# Patient Record
Sex: FEMALE | Race: BLACK OR AFRICAN AMERICAN | NOT HISPANIC OR LATINO | Employment: OTHER | URBAN - METROPOLITAN AREA
[De-identification: names, ages, dates, MRNs, and addresses within clinical notes are randomized per-mention and may not be internally consistent; named-entity substitution may affect disease eponyms.]

---

## 2017-01-24 ENCOUNTER — TELEPHONE (OUTPATIENT)
Dept: PHARMACY | Facility: CLINIC | Age: 77
End: 2017-01-24

## 2017-01-24 DIAGNOSIS — D47.1 MYELOPROLIFERATIVE DISORDER: ICD-10-CM

## 2017-01-24 RX ORDER — RUXOLITINIB 20 MG/1
TABLET ORAL
Qty: 60 TABLET | Refills: 0 | Status: SHIPPED | OUTPATIENT
Start: 2017-01-24 | End: 2017-04-21

## 2017-01-25 DIAGNOSIS — D47.1 MYELOPROLIFERATIVE DISORDER: ICD-10-CM

## 2017-01-25 RX ORDER — RUXOLITINIB 20 MG/1
TABLET ORAL
Qty: 60 TABLET | Refills: 0 | Status: SHIPPED | OUTPATIENT
Start: 2017-01-25 | End: 2017-04-19 | Stop reason: SDUPTHER

## 2017-01-27 ENCOUNTER — TELEPHONE (OUTPATIENT)
Dept: HEMATOLOGY/ONCOLOGY | Facility: CLINIC | Age: 77
End: 2017-01-27

## 2017-01-27 NOTE — TELEPHONE ENCOUNTER
----- Message from Zamzam Larios sent at 1/27/2017 11:12 AM CST -----  Patient's  would like the nurse to give him a call back about setting up a follow up visit. He can be reached at [phone].

## 2017-01-27 NOTE — TELEPHONE ENCOUNTER
Returned call and spoke with Mr. Hammer. Patient  calling to schedule Mrs Hammer f/u appointment with Dr Daniels and lab work. Appointment scheduled and appointment information given to Mr Hammer and mailed to their home.

## 2017-02-03 ENCOUNTER — LAB VISIT (OUTPATIENT)
Dept: LAB | Facility: HOSPITAL | Age: 77
End: 2017-02-03
Attending: INTERNAL MEDICINE
Payer: MEDICARE

## 2017-02-03 ENCOUNTER — OFFICE VISIT (OUTPATIENT)
Dept: HEMATOLOGY/ONCOLOGY | Facility: CLINIC | Age: 77
End: 2017-02-03
Payer: MEDICARE

## 2017-02-03 VITALS
HEART RATE: 95 BPM | SYSTOLIC BLOOD PRESSURE: 126 MMHG | WEIGHT: 108.44 LBS | DIASTOLIC BLOOD PRESSURE: 64 MMHG | TEMPERATURE: 99 F | HEIGHT: 60 IN | BODY MASS INDEX: 21.29 KG/M2

## 2017-02-03 DIAGNOSIS — D47.3 ESSENTIAL THROMBOCYTHEMIA: Primary | ICD-10-CM

## 2017-02-03 DIAGNOSIS — D47.3 ESSENTIAL THROMBOCYTHEMIA: ICD-10-CM

## 2017-02-03 LAB
ALBUMIN SERPL BCP-MCNC: 4 G/DL
ALP SERPL-CCNC: 59 U/L
ALT SERPL W/O P-5'-P-CCNC: 31 U/L
ANION GAP SERPL CALC-SCNC: 8 MMOL/L
ANISOCYTOSIS BLD QL SMEAR: SLIGHT
AST SERPL-CCNC: 33 U/L
BASOPHILS # BLD AUTO: ABNORMAL K/UL
BASOPHILS NFR BLD: 0 %
BILIRUB SERPL-MCNC: 0.6 MG/DL
BUN SERPL-MCNC: 23 MG/DL
CALCIUM SERPL-MCNC: 9.6 MG/DL
CHLORIDE SERPL-SCNC: 105 MMOL/L
CO2 SERPL-SCNC: 28 MMOL/L
CREAT SERPL-MCNC: 1.4 MG/DL
DIFFERENTIAL METHOD: ABNORMAL
EOSINOPHIL # BLD AUTO: ABNORMAL K/UL
EOSINOPHIL NFR BLD: 0 %
ERYTHROCYTE [DISTWIDTH] IN BLOOD BY AUTOMATED COUNT: 16 %
EST. GFR  (AFRICAN AMERICAN): 42.1 ML/MIN/1.73 M^2
EST. GFR  (NON AFRICAN AMERICAN): 36.5 ML/MIN/1.73 M^2
GLUCOSE SERPL-MCNC: 82 MG/DL
HCT VFR BLD AUTO: 27.2 %
HGB BLD-MCNC: 9.2 G/DL
HYPOCHROMIA BLD QL SMEAR: ABNORMAL
LDH SERPL L TO P-CCNC: 454 U/L
LYMPHOCYTES # BLD AUTO: ABNORMAL K/UL
LYMPHOCYTES NFR BLD: 14 %
MCH RBC QN AUTO: 31.1 PG
MCHC RBC AUTO-ENTMCNC: 33.8 %
MCV RBC AUTO: 92 FL
MONOCYTES # BLD AUTO: ABNORMAL K/UL
MONOCYTES NFR BLD: 8 %
MYELOCYTES NFR BLD MANUAL: 1 %
NEUTROPHILS NFR BLD: 76 %
NEUTS BAND NFR BLD MANUAL: 1 %
OVALOCYTES BLD QL SMEAR: ABNORMAL
PLATELET # BLD AUTO: 541 K/UL
PMV BLD AUTO: 10.2 FL
POIKILOCYTOSIS BLD QL SMEAR: SLIGHT
POLYCHROMASIA BLD QL SMEAR: ABNORMAL
POTASSIUM SERPL-SCNC: 4 MMOL/L
PROT SERPL-MCNC: 8.7 G/DL
RBC # BLD AUTO: 2.96 M/UL
SODIUM SERPL-SCNC: 141 MMOL/L
URATE SERPL-MCNC: 6.9 MG/DL
WBC # BLD AUTO: 31.15 K/UL

## 2017-02-03 PROCEDURE — 99999 PR PBB SHADOW E&M-EST. PATIENT-LVL III: CPT | Mod: PBBFAC,,, | Performed by: INTERNAL MEDICINE

## 2017-02-03 PROCEDURE — 99213 OFFICE O/P EST LOW 20 MIN: CPT | Mod: S$PBB,,, | Performed by: INTERNAL MEDICINE

## 2017-02-03 PROCEDURE — 84550 ASSAY OF BLOOD/URIC ACID: CPT

## 2017-02-03 PROCEDURE — 80053 COMPREHEN METABOLIC PANEL: CPT

## 2017-02-03 PROCEDURE — 85027 COMPLETE CBC AUTOMATED: CPT

## 2017-02-03 PROCEDURE — 83615 LACTATE (LD) (LDH) ENZYME: CPT

## 2017-02-03 PROCEDURE — 85007 BL SMEAR W/DIFF WBC COUNT: CPT

## 2017-02-03 PROCEDURE — 36415 COLL VENOUS BLD VENIPUNCTURE: CPT

## 2017-02-03 NOTE — LETTER
February 3, 2017        Jd Wooten Jr., MD  [address]  [address]  [facility]  [place] [address]             [facility]  [address]  [place] [address]  Phone: [phone]   Patient: Courtney Hammer   MR Number: [id number]   YOB: 1940   Date of Visit: 2/3/2017       Dear Dr. Wooten:    Thank you for referring Courtney Hammer to me for evaluation. Below are the relevant portions of my assessment and plan of care.       1. Essential thrombocythemia         Ms. 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 However, her syndrome accelerated with a rapidly rising WBC in late 2015 with falling hemoglobin. A repeat marrow on 1/26/16 showed a 100% cellular marrow with increased megakaryocytes and storage iron and only occasional blasts on CD34 staining (morphologic blast count 1%).  Jak2 V617F mutation was again detected without CalR or Mpl exon 10 mutations.    Hence, she was started on ruxolitinib 20 mg bid 2/2016.  Her WBC has been controlled since that time and is 31K today with stable hemoglobin at 9.2 and platelets 541K.  She has not needed any blood transfusions since her visit in early 3/2016.  Depending on her platelet rise in the future we may need to alter ruxolitinib dosing.    No other new medical issues and she is tolerating ruxolitinib well.    All of her questions were answered in the clinic today and she will follow up with Dr. Wooten in 2 months with RTC in four months.     If you have questions, please do not hesitate to call me. I look forward to following Courtney along with you.    Sincerely,      Rico Daniels MD           CC  Florencio Pacheco MD

## 2017-02-03 NOTE — PROGRESS NOTES
Subjective:       Patient ID: Courtney Hammer is a 76 y.o. female.    Chief Complaint: No chief complaint on file.    HPI  Ms. Hammer is here for follow up of a Jak2 V617F positive myeloproliferative disorder now on ruxolitinib. She has also been followed by Dr. Wooten. She is feeling quite well today and has not required any blood transfusion since 3/2016 with much improved WBC and stable platelet count.  No new issues and no other side effects on ruxolitinib.  She is quite functional.    History:  She was first noted to have an elevated platelet count in early 2011 with CBC 6/7/11 showing WBC 56.9K, Hb 12.0, Plt 1090K (74P, 21L).  On 7/8/11 she had a marrow biopsy showing 90% cellularity with megakaryocyte hyperplasia with dysmorphic features. Myeloid hyperplasia with left shift. No lymphoid infiltrate or increased blasts. Flow cytometry showed virtually no B cells but no other concerning features. [employer] 46,XY[20]. Bcr-abl negative. Testing for Jak2 V617F on 7/7/15 was positive in 5% of total Jak2 DNA. Additional testing that same day for CalR and Mpl exon 10 mutation were both negative. Hence, she has Jak2+ ET.    She was started on hydroxyurea and ASA for essential thrombocythemia and had been stable on this until she began to require blood transfusions early in 2015. Her RBC transfusion needs have slowly increased and she has been continued on 1500 mg hydroxyurea, her same dose for many years. This has been decreased but her transfusion needs continue along with an alarming increase in her WBC.    CBC 6/18/15 WBC 31.8K, hemoglobin 4.4, platelets 212K. She received 4U PRBC and CBC on 6/30 WBC 23K, hemoglobin 10.3, platelets 97K.  A repeat marrow on 1/26/16 showed a 100% cellular marrow with increased megakaryocytes and storage iron and only occasional blasts on CD34 staining (morphologic blast count 1%).  Jak2 V617F mutation was again detected without CalR or Mpl exon 10 mutations.    Hence, she was started on  ruxolitinib 20 mg bid 2/2015.     Review of Systems   Constitutional: Negative for appetite change (better on megace), diaphoresis, fatigue and fever.   HENT: Positive for postnasal drip. Negative for mouth sores, nosebleeds and sore throat.    Respiratory: Negative for cough and shortness of breath.    Cardiovascular: Negative for chest pain and leg swelling.   Gastrointestinal: Negative for abdominal pain, blood in stool, constipation, diarrhea and nausea.   Genitourinary: Positive for frequency. Negative for dysuria, hematuria and vaginal bleeding (menopause at age 65).   Musculoskeletal: Negative for arthralgias, back pain and joint swelling.   Skin: Negative for rash.   Neurological: Negative for tremors, weakness, light-headedness and headaches.   Psychiatric/Behavioral: Negative for confusion. The patient is not nervous/anxious.        Objective:      Physical Exam   Constitutional: She is oriented to person, place, and time. She appears well-developed and well-nourished. No distress.   Elderly female in no distress   HENT:   Head: Normocephalic and atraumatic.   Mouth/Throat: No oropharyngeal exudate.   Eyes: Conjunctivae are normal. Pupils are equal, round, and reactive to light.   Neck: Neck supple.   Cardiovascular: Normal rate and regular rhythm.    Pulmonary/Chest: Effort normal and breath sounds normal. She has no rales.   Abdominal: Soft. Bowel sounds are normal. She exhibits no mass. There is no hepatosplenomegaly. There is no tenderness.   Musculoskeletal: Normal range of motion. She exhibits no edema.   Lymphadenopathy:     She has no cervical adenopathy.     She has no axillary adenopathy.        Right: No inguinal adenopathy present.        Left: No inguinal adenopathy present.   Neurological: She is alert and oriented to person, place, and time.   Skin: Skin is warm and dry. No rash noted.   Psychiatric: She has a normal mood and affect. Thought content normal.       Assessment:       1.  Essential thrombocythemia        Plan:       Ms. 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 However, her syndrome accelerated with a rapidly rising WBC in late 2015 with falling hemoglobin. A repeat marrow on 1/26/16 showed a 100% cellular marrow with increased megakaryocytes and storage iron and only occasional blasts on CD34 staining (morphologic blast count 1%).  Jak2 V617F mutation was again detected without CalR or Mpl exon 10 mutations.    Hence, she was started on ruxolitinib 20 mg bid 2/2016.  Her WBC has been controlled since that time and is 31K today with stable hemoglobin at 9.2 and platelets 541K.  She has not needed any blood transfusions since her visit in early 3/2016.  Depending on her platelet rise in the future we may need to alter ruxolitinib dosing.    No other new medical issues and she is tolerating ruxolitinib well.    All of her questions were answered in the clinic today and she will follow up with Dr. Wooten in 2 months with RTC in four months.

## 2017-02-27 ENCOUNTER — TELEPHONE (OUTPATIENT)
Dept: PHARMACY | Facility: CLINIC | Age: 77
End: 2017-02-27

## 2017-03-01 ENCOUNTER — TELEPHONE (OUTPATIENT)
Dept: HEMATOLOGY/ONCOLOGY | Facility: CLINIC | Age: 77
End: 2017-03-01

## 2017-03-01 NOTE — TELEPHONE ENCOUNTER
----- Message from Jared Ledesma sent at 3/1/2017 12:08 PM CST -----  Contact: self   Pt  is calling in to get in touch with the nurse or doctor regarding getting in touch with the doctor in [place].     Please contact pt for more information needed at [phone]    Thanks

## 2017-03-02 ENCOUNTER — TELEPHONE (OUTPATIENT)
Dept: PHARMACY | Facility: CLINIC | Age: 77
End: 2017-03-02

## 2017-03-02 NOTE — TELEPHONE ENCOUNTER
DOCUMENTATION ONLY:  TATIANA Override Approved 3/2/17  Case#: [id number]  Approval Dates: 3/2/17-3/2/18

## 2017-03-27 ENCOUNTER — TELEPHONE (OUTPATIENT)
Dept: PHARMACY | Facility: CLINIC | Age: 77
End: 2017-03-27

## 2017-04-19 ENCOUNTER — TELEPHONE (OUTPATIENT)
Dept: PHARMACY | Facility: CLINIC | Age: 77
End: 2017-04-19

## 2017-04-19 DIAGNOSIS — D47.1 MYELOPROLIFERATIVE DISORDER: ICD-10-CM

## 2017-04-21 DIAGNOSIS — D47.1 MYELOPROLIFERATIVE DISORDER: ICD-10-CM

## 2017-04-21 RX ORDER — RUXOLITINIB 20 MG/1
TABLET ORAL
Qty: 60 TABLET | Refills: 0 | Status: SHIPPED | OUTPATIENT
Start: 2017-04-21 | End: 2017-04-21 | Stop reason: SDUPTHER

## 2017-04-25 ENCOUNTER — TELEPHONE (OUTPATIENT)
Dept: PHARMACY | Facility: CLINIC | Age: 77
End: 2017-04-25

## 2017-05-18 ENCOUNTER — TELEPHONE (OUTPATIENT)
Dept: PHARMACY | Facility: CLINIC | Age: 77
End: 2017-05-18

## 2017-06-12 ENCOUNTER — TELEPHONE (OUTPATIENT)
Dept: PHARMACY | Facility: CLINIC | Age: 77
End: 2017-06-12

## 2017-06-21 ENCOUNTER — OFFICE VISIT (OUTPATIENT)
Dept: HEMATOLOGY/ONCOLOGY | Facility: CLINIC | Age: 77
End: 2017-06-21
Payer: MEDICARE

## 2017-06-21 ENCOUNTER — LAB VISIT (OUTPATIENT)
Dept: LAB | Facility: HOSPITAL | Age: 77
End: 2017-06-21
Attending: INTERNAL MEDICINE
Payer: MEDICARE

## 2017-06-21 VITALS
SYSTOLIC BLOOD PRESSURE: 129 MMHG | DIASTOLIC BLOOD PRESSURE: 67 MMHG | TEMPERATURE: 99 F | HEART RATE: 115 BPM | WEIGHT: 103.38 LBS | HEIGHT: 61 IN | BODY MASS INDEX: 19.52 KG/M2

## 2017-06-21 DIAGNOSIS — D64.81 ANEMIA DUE TO ANTINEOPLASTIC CHEMOTHERAPY: ICD-10-CM

## 2017-06-21 DIAGNOSIS — D47.3 ESSENTIAL THROMBOCYTHEMIA: Primary | ICD-10-CM

## 2017-06-21 DIAGNOSIS — D47.3 ESSENTIAL THROMBOCYTHEMIA: ICD-10-CM

## 2017-06-21 DIAGNOSIS — T45.1X5A ANEMIA DUE TO ANTINEOPLASTIC CHEMOTHERAPY: ICD-10-CM

## 2017-06-21 LAB
ALBUMIN SERPL BCP-MCNC: 4.1 G/DL
ALP SERPL-CCNC: 72 U/L
ALT SERPL W/O P-5'-P-CCNC: 45 U/L
ANION GAP SERPL CALC-SCNC: 9 MMOL/L
ANISOCYTOSIS BLD QL SMEAR: SLIGHT
AST SERPL-CCNC: 49 U/L
BASOPHILS NFR BLD: 1 %
BILIRUB SERPL-MCNC: 0.8 MG/DL
BUN SERPL-MCNC: 18 MG/DL
CALCIUM SERPL-MCNC: 9.6 MG/DL
CHLORIDE SERPL-SCNC: 104 MMOL/L
CO2 SERPL-SCNC: 27 MMOL/L
CREAT SERPL-MCNC: 1.6 MG/DL
DACRYOCYTES BLD QL SMEAR: ABNORMAL
DIFFERENTIAL METHOD: ABNORMAL
EOSINOPHIL NFR BLD: 0 %
ERYTHROCYTE [DISTWIDTH] IN BLOOD BY AUTOMATED COUNT: 14.7 %
EST. GFR  (AFRICAN AMERICAN): 35.8 ML/MIN/1.73 M^2
EST. GFR  (NON AFRICAN AMERICAN): 31.1 ML/MIN/1.73 M^2
GIANT PLATELETS BLD QL SMEAR: PRESENT
GLUCOSE SERPL-MCNC: 223 MG/DL
HCT VFR BLD AUTO: 24.2 %
HGB BLD-MCNC: 8 G/DL
HYPOCHROMIA BLD QL SMEAR: ABNORMAL
LYMPHOCYTES NFR BLD: 11 %
MCH RBC QN AUTO: 31.3 PG
MCHC RBC AUTO-ENTMCNC: 33.1 %
MCV RBC AUTO: 95 FL
METAMYELOCYTES NFR BLD MANUAL: 3 %
MONOCYTES NFR BLD: 6 %
MYELOCYTES NFR BLD MANUAL: 7 %
NEUTROPHILS NFR BLD: 69 %
NEUTS BAND NFR BLD MANUAL: 3 %
PLATELET # BLD AUTO: 177 K/UL
PLATELET BLD QL SMEAR: ABNORMAL
PMV BLD AUTO: 10.7 FL
POIKILOCYTOSIS BLD QL SMEAR: SLIGHT
POLYCHROMASIA BLD QL SMEAR: ABNORMAL
POTASSIUM SERPL-SCNC: 3.6 MMOL/L
PROT SERPL-MCNC: 8.5 G/DL
RBC # BLD AUTO: 2.56 M/UL
SODIUM SERPL-SCNC: 140 MMOL/L
WBC # BLD AUTO: 25.92 K/UL

## 2017-06-21 PROCEDURE — 85007 BL SMEAR W/DIFF WBC COUNT: CPT

## 2017-06-21 PROCEDURE — 99999 PR PBB SHADOW E&M-EST. PATIENT-LVL II: CPT | Mod: PBBFAC,,, | Performed by: INTERNAL MEDICINE

## 2017-06-21 PROCEDURE — 80053 COMPREHEN METABOLIC PANEL: CPT

## 2017-06-21 PROCEDURE — 36415 COLL VENOUS BLD VENIPUNCTURE: CPT

## 2017-06-21 PROCEDURE — 1159F MED LIST DOCD IN RCRD: CPT | Mod: ,,, | Performed by: INTERNAL MEDICINE

## 2017-06-21 PROCEDURE — 99213 OFFICE O/P EST LOW 20 MIN: CPT | Mod: S$PBB,,, | Performed by: INTERNAL MEDICINE

## 2017-06-21 PROCEDURE — 85027 COMPLETE CBC AUTOMATED: CPT

## 2017-06-21 PROCEDURE — 1126F AMNT PAIN NOTED NONE PRSNT: CPT | Mod: ,,, | Performed by: INTERNAL MEDICINE

## 2017-06-21 NOTE — PROGRESS NOTES
Subjective:       Patient ID: Courtney Hammer is a 76 y.o. female.    Chief Complaint: No chief complaint on file.    HPI  Ms. Hammer is here for follow up of a Jak2 V617F positive myeloproliferative disorder now on ruxolitinib. She has also been followed by Dr. Wooten in the past.  She notes recent concerns with renal dysfunction and has noted polydipsia and polyuria.   She has also been somewhat weaker overall.  No blood transfusion since 3/2016 with much improved WBC and normal platelet count.      History:  She was first noted to have an elevated platelet count in early 2011 with CBC 6/7/11 showing WBC 56.9K, Hb 12.0, Plt 1090K (74P, 21L).  On 7/8/11 she had a marrow biopsy showing 90% cellularity with megakaryocyte hyperplasia with dysmorphic features. Myeloid hyperplasia with left shift. No lymphoid infiltrate or increased blasts. Flow cytometry showed virtually no B cells but no other concerning features. [employer] 46,XY[20]. Bcr-abl negative. Testing for Jak2 V617F on 7/7/15 was positive in 5% of total Jak2 DNA. Additional testing that same day for CalR and Mpl exon 10 mutation were both negative. Hence, she has Jak2+ ET.    She was started on hydroxyurea and ASA for essential thrombocythemia and had been stable on this until she began to require blood transfusions early in 2015. Her RBC transfusion needs have slowly increased and she has been continued on 1500 mg hydroxyurea, her same dose for many years. This has been decreased but her transfusion needs continue along with an alarming increase in her WBC.    CBC 6/18/15 WBC 31.8K, hemoglobin 4.4, platelets 212K. She received 4U PRBC and CBC on 6/30 WBC 23K, hemoglobin 10.3, platelets 97K.  A repeat marrow on 1/26/16 showed a 100% cellular marrow with increased megakaryocytes and storage iron and only occasional blasts on CD34 staining (morphologic blast count 1%).  Jak2 V617F mutation was again detected without CalR or Mpl exon 10 mutations.    Hence, she  was started on ruxolitinib 20 mg bid 2/2015.     Review of Systems   Constitutional: Negative for appetite change (better on megace), diaphoresis, fatigue and fever.   HENT: Positive for postnasal drip. Negative for mouth sores, nosebleeds and sore throat.    Respiratory: Negative for cough and shortness of breath.    Cardiovascular: Negative for chest pain and leg swelling.   Gastrointestinal: Negative for abdominal pain, blood in stool, constipation, diarrhea and nausea.   Endocrine: Positive for polydipsia and polyuria.   Genitourinary: Positive for frequency. Negative for dysuria, hematuria and vaginal bleeding (menopause at age 65).   Musculoskeletal: Negative for arthralgias, back pain and joint swelling.   Skin: Negative for rash.   Neurological: Negative for tremors, weakness, light-headedness and headaches.   Psychiatric/Behavioral: Negative for confusion. The patient is not nervous/anxious.        Objective:      Physical Exam   Constitutional: She is oriented to person, place, and time. She appears well-developed and well-nourished. No distress.   Elderly female in no distress   HENT:   Head: Normocephalic and atraumatic.   Mouth/Throat: No oropharyngeal exudate.   Eyes: Conjunctivae are normal. Pupils are equal, round, and reactive to light.   Neck: Neck supple.   Cardiovascular: Normal rate and regular rhythm.    Pulmonary/Chest: Effort normal and breath sounds normal. She has no rales.   Abdominal: Soft. Bowel sounds are normal. She exhibits no mass. There is no hepatosplenomegaly. There is no tenderness.   Musculoskeletal: Normal range of motion. She exhibits no edema.   Lymphadenopathy:     She has no cervical adenopathy.     She has no axillary adenopathy.        Right: No inguinal adenopathy present.        Left: No inguinal adenopathy present.   Neurological: She is alert and oriented to person, place, and time.   Skin: Skin is warm and dry. No rash noted.   Psychiatric: She has a normal mood and  affect. Thought content normal.       Assessment:       1. 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 However, her syndrome accelerated with a rapidly rising WBC in late 2015 with falling hemoglobin. A repeat marrow on 1/26/16 showed a 100% cellular marrow with increased megakaryocytes and storage iron and only occasional blasts on CD34 staining (morphologic blast count 1%).  Jak2 V617F mutation was again detected without CalR or Mpl exon 10 mutations.    Hence, she was started on ruxolitinib 20 mg bid 2/2016.  Her WBC has been controlled since that time and is 26K today with stable hemoglobin at 8 (down somewhat, potentially renal issues secondary to hyperglycemia) and platelets normal at 177K.  She has not needed any blood transfusions since her visit in early 3/2016.  Depending on her platelet rise in the future we may need to alter ruxolitinib dosing and we may decrease it if her anemia worsens.  Once pacrinitinib is approved this may be a better agent given her anemia.  Hopefully that will occur later this year.    Her major issue today is significant hyperglycemia and there is a family history of late onset diabetes.  Her blood sugar is 223 today with creatinine 1.6.  I suspect her renal issues are secondary to her hyperglycemia and have discussed this with Dr. Pacheco, her primary care attending, who will follow up on this issue.    All of her questions were answered in the clinic today and she will follow up with Dr. Wooten in 2 months with RTC in four months.

## 2017-06-21 NOTE — LETTER
June 21, 2017        Florencio Pacheco MD  [address]  [place] [address]             [facility]  [address]  [facility] [address]  Phone: [phone]   Patient: Courtney Hammer   MR Number: [id number]   YOB: 1940   Date of Visit: 6/21/2017       Dear Dr. Pacheco:    Thank you for referring Courtney Hammer to me for evaluation. Below are the relevant portions of my assessment and plan of care.        1. 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 However, her syndrome accelerated with a rapidly rising WBC in late 2015 with falling hemoglobin. A repeat marrow on 1/26/16 showed a 100% cellular marrow with increased megakaryocytes and storage iron and only occasional blasts on CD34 staining (morphologic blast count 1%).  Jak2 V617F mutation was again detected without CalR or Mpl exon 10 mutations.    Hence, she was started on ruxolitinib 20 mg bid 2/2016.  Her WBC has been controlled since that time and is 26K today with stable hemoglobin at 8 (down somewhat, potentially renal issues secondary to hyperglycemia) and platelets normal at 177K.  She has not needed any blood transfusions since her visit in early 3/2016.  Depending on her platelet rise in the future we may need to alter ruxolitinib dosing and we may decrease it if her anemia worsens.  Once pacrinitinib is approved this may be a better agent given her anemia.  Hopefully that will occur later this year.    Her major issue today is significant hyperglycemia and there is a family history of late onset diabetes.  Her blood sugar is 223 today with creatinine 1.6.  I suspect her renal issues are secondary to her hyperglycemia and have discussed this with Dr. Pacheco, her primary care attending, who will follow up on this issue.    All of her questions were answered in the clinic today and she will follow up with   Sugar in 2 months with RTC in four months.     If you have questions, please do not hesitate to call me. I look forward to following Courtney along with you.    Sincerely,      Rico Daniels MD           CC  No Recipients

## 2017-06-23 ENCOUNTER — TELEPHONE (OUTPATIENT)
Dept: HEMATOLOGY/ONCOLOGY | Facility: CLINIC | Age: 77
End: 2017-06-23

## 2017-06-23 NOTE — TELEPHONE ENCOUNTER
----- Message from Jose Luis Redd sent at 6/23/2017  9:54 AM CDT -----  Contact: Patient   Patient's  called in needing to talk to Dr. Daniels about what they discussed with him on Wednesday June 21st. Please give him a call at [phone].

## 2017-06-23 NOTE — TELEPHONE ENCOUNTER
Returned phone call to patient's . Per Dr. Daniels's note on 06/21/2017, patient was found to be hyperglycemic with a creatinine of 1.6. Dr. Daniels referred patient back to PCP Dr. Pacheco for management in hyperglycemia and elevated creatinine. Per patient's , Dr. Daniels had told them that a renal work up may not be necessary, as controlling the hyperglycemia may lower the creatinine level. The patient's  states they have just left Dr. Pacheco's office, and Dr. Pacheco would like patient to complete renal work up per Nephrology recommendations, and patient to have a renal US on 07/06/2017. Discussed with patient's  that Dr. Daniels is a hematology/oncology specialist and referred this issue to PCP, so patient should follow the recommendations of Dr. Pacheco. Patient's  verbalizes understanding.

## 2017-07-07 ENCOUNTER — TELEPHONE (OUTPATIENT)
Dept: PHARMACY | Facility: CLINIC | Age: 77
End: 2017-07-07

## 2017-07-21 ENCOUNTER — TELEPHONE (OUTPATIENT)
Dept: PHARMACY | Facility: CLINIC | Age: 77
End: 2017-07-21

## 2017-08-18 ENCOUNTER — TELEPHONE (OUTPATIENT)
Dept: PHARMACY | Facility: CLINIC | Age: 77
End: 2017-08-18

## 2017-09-19 ENCOUNTER — TELEPHONE (OUTPATIENT)
Dept: PHARMACY | Facility: CLINIC | Age: 77
End: 2017-09-19

## 2017-09-25 ENCOUNTER — TELEPHONE (OUTPATIENT)
Dept: HEMATOLOGY/ONCOLOGY | Facility: CLINIC | Age: 77
End: 2017-09-25

## 2017-09-25 NOTE — TELEPHONE ENCOUNTER
----- Message from Lara Covarrubias sent at 9/23/2017 11:00 AM CDT -----  Contact: Pt's  Mr. Hammer [phone]  Pt's  is requesting a call back from the nurse to see if the appts on 10.23.17 can be moved to 10.9.17 due to his daughter being able to bring them on that day.    Pt may be reached at [phone].    Thank you.  LC

## 2017-09-25 NOTE — TELEPHONE ENCOUNTER
patients spouse [place] confirmed need of refill for jakafi, unsure of dose count, no missed doses, no new medications or allergies and has no questions for a pharmacist at this time. $25 copay confirmed, credit card on file, shipping address confirmed, rx will ship 9/28 to arrive 9/29 with consent.    Katie Hondroulis [facility]- Refill Technician  Phone: [phone]

## 2017-10-09 ENCOUNTER — OFFICE VISIT (OUTPATIENT)
Dept: HEMATOLOGY/ONCOLOGY | Facility: CLINIC | Age: 77
End: 2017-10-09
Payer: MEDICARE

## 2017-10-09 ENCOUNTER — LAB VISIT (OUTPATIENT)
Dept: LAB | Facility: HOSPITAL | Age: 77
End: 2017-10-09
Attending: INTERNAL MEDICINE
Payer: MEDICARE

## 2017-10-09 VITALS
WEIGHT: 95.44 LBS | SYSTOLIC BLOOD PRESSURE: 96 MMHG | RESPIRATION RATE: 18 BRPM | HEART RATE: 101 BPM | DIASTOLIC BLOOD PRESSURE: 46 MMHG | OXYGEN SATURATION: 99 % | BODY MASS INDEX: 18.04 KG/M2 | TEMPERATURE: 98 F

## 2017-10-09 DIAGNOSIS — T45.1X5A ANEMIA DUE TO ANTINEOPLASTIC CHEMOTHERAPY: ICD-10-CM

## 2017-10-09 DIAGNOSIS — D64.81 ANEMIA DUE TO ANTINEOPLASTIC CHEMOTHERAPY: ICD-10-CM

## 2017-10-09 DIAGNOSIS — D64.9 ANEMIA, UNSPECIFIED TYPE: Primary | ICD-10-CM

## 2017-10-09 DIAGNOSIS — D47.3 ESSENTIAL THROMBOCYTHEMIA: ICD-10-CM

## 2017-10-09 DIAGNOSIS — D64.9 ANEMIA, UNSPECIFIED TYPE: ICD-10-CM

## 2017-10-09 DIAGNOSIS — D47.3 ESSENTIAL THROMBOCYTHEMIA: Primary | ICD-10-CM

## 2017-10-09 LAB
ABO + RH BLD: NORMAL
ALBUMIN SERPL BCP-MCNC: 3.8 G/DL
ALP SERPL-CCNC: 65 U/L
ALT SERPL W/O P-5'-P-CCNC: 22 U/L
ANION GAP SERPL CALC-SCNC: 8 MMOL/L
ANISOCYTOSIS BLD QL SMEAR: SLIGHT
AST SERPL-CCNC: 25 U/L
BASOPHILS # BLD AUTO: ABNORMAL K/UL
BASOPHILS NFR BLD: 1 %
BILIRUB SERPL-MCNC: 0.6 MG/DL
BLD GP AB SCN CELLS X3 SERPL QL: NORMAL
BUN SERPL-MCNC: 32 MG/DL
CALCIUM SERPL-MCNC: 9.5 MG/DL
CHLORIDE SERPL-SCNC: 102 MMOL/L
CO2 SERPL-SCNC: 25 MMOL/L
CREAT SERPL-MCNC: 2.1 MG/DL
DACRYOCYTES BLD QL SMEAR: ABNORMAL
DIFFERENTIAL METHOD: ABNORMAL
EOSINOPHIL # BLD AUTO: ABNORMAL K/UL
EOSINOPHIL NFR BLD: 0 %
ERYTHROCYTE [DISTWIDTH] IN BLOOD BY AUTOMATED COUNT: 17.3 %
EST. GFR  (AFRICAN AMERICAN): 25.8 ML/MIN/1.73 M^2
EST. GFR  (NON AFRICAN AMERICAN): 22.4 ML/MIN/1.73 M^2
GLUCOSE SERPL-MCNC: 90 MG/DL
HCT VFR BLD AUTO: 14.6 %
HGB BLD-MCNC: 4.9 G/DL
HYPOCHROMIA BLD QL SMEAR: ABNORMAL
LYMPHOCYTES # BLD AUTO: ABNORMAL K/UL
LYMPHOCYTES NFR BLD: 13 %
MCH RBC QN AUTO: 30.2 PG
MCHC RBC AUTO-ENTMCNC: 33.6 G/DL
MCV RBC AUTO: 90 FL
MONOCYTES # BLD AUTO: ABNORMAL K/UL
MONOCYTES NFR BLD: 0 %
MYELOCYTES NFR BLD MANUAL: 2 %
NEUTROPHILS NFR BLD: 84 %
OVALOCYTES BLD QL SMEAR: ABNORMAL
PLATELET # BLD AUTO: 81 K/UL
PMV BLD AUTO: ABNORMAL FL
POIKILOCYTOSIS BLD QL SMEAR: SLIGHT
POLYCHROMASIA BLD QL SMEAR: ABNORMAL
POTASSIUM SERPL-SCNC: 4.7 MMOL/L
PROT SERPL-MCNC: 8.1 G/DL
RBC # BLD AUTO: 1.62 M/UL
SCHISTOCYTES BLD QL SMEAR: PRESENT
SODIUM SERPL-SCNC: 135 MMOL/L
WBC # BLD AUTO: 16.26 K/UL

## 2017-10-09 PROCEDURE — 99213 OFFICE O/P EST LOW 20 MIN: CPT | Mod: S$PBB,,, | Performed by: INTERNAL MEDICINE

## 2017-10-09 PROCEDURE — 85027 COMPLETE CBC AUTOMATED: CPT

## 2017-10-09 PROCEDURE — 80053 COMPREHEN METABOLIC PANEL: CPT

## 2017-10-09 PROCEDURE — 99999 PR PBB SHADOW E&M-EST. PATIENT-LVL III: CPT | Mod: PBBFAC,,, | Performed by: INTERNAL MEDICINE

## 2017-10-09 PROCEDURE — 86900 BLOOD TYPING SEROLOGIC ABO: CPT

## 2017-10-09 PROCEDURE — 36415 COLL VENOUS BLD VENIPUNCTURE: CPT

## 2017-10-09 PROCEDURE — 86901 BLOOD TYPING SEROLOGIC RH(D): CPT

## 2017-10-09 PROCEDURE — 85007 BL SMEAR W/DIFF WBC COUNT: CPT

## 2017-10-09 PROCEDURE — 27201040 HC RC 50 FILTER

## 2017-10-09 PROCEDURE — 99213 OFFICE O/P EST LOW 20 MIN: CPT | Mod: PBBFAC,25 | Performed by: INTERNAL MEDICINE

## 2017-10-09 RX ORDER — DIPHENHYDRAMINE HCL 25 MG
25 CAPSULE ORAL
Status: CANCELLED | OUTPATIENT
Start: 2017-10-09

## 2017-10-09 RX ORDER — HYDROCODONE BITARTRATE AND ACETAMINOPHEN 500; 5 MG/1; MG/1
TABLET ORAL ONCE
Status: CANCELLED | OUTPATIENT
Start: 2017-10-09 | End: 2017-10-09

## 2017-10-09 RX ORDER — ACETAMINOPHEN 325 MG/1
650 TABLET ORAL
Status: CANCELLED | OUTPATIENT
Start: 2017-10-09

## 2017-10-09 NOTE — PROGRESS NOTES
Subjective:       Patient ID: Courtney Hammer is a 76 y.o. female.    Chief Complaint: No chief complaint on file.    HPI  Mrs. Hammer is here for follow up of a Jak2 V617F positive myeloproliferative disorder now on ruxolitinib. She has also been followed by Dr. Wooten in the past.  She has noted much increased weakness recently and has had progressive cytopenias.   She has also been somewhat weaker overall.  No blood transfusion since 3/2016 but she will need blood today.      History:  She was first noted to have an elevated platelet count in early 2011 with CBC 6/7/11 showing WBC 56.9K, Hb 12.0, Plt 1090K (74P, 21L).  On 7/8/11 she had a marrow biopsy showing 90% cellularity with megakaryocyte hyperplasia with dysmorphic features. Myeloid hyperplasia with left shift. No lymphoid infiltrate or increased blasts. Flow cytometry showed virtually no B cells but no other concerning features. [employer] 46,XY[20]. Bcr-abl negative. Testing for Jak2 V617F on 7/7/15 was positive in 5% of total Jak2 DNA. Additional testing that same day for CalR and Mpl exon 10 mutation were both negative. Hence, she has Jak2+ ET.    She was started on hydroxyurea and ASA for essential thrombocythemia and had been stable on this until she began to require blood transfusions early in 2015. Her RBC transfusion needs have slowly increased and she has been continued on 1500 mg hydroxyurea, her same dose for many years. This has been decreased but her transfusion needs continue along with an alarming increase in her WBC.    CBC 6/18/15 WBC 31.8K, hemoglobin 4.4, platelets 212K. She received 4U PRBC and CBC on 6/30 WBC 23K, hemoglobin 10.3, platelets 97K.  A repeat marrow on 1/26/16 showed a 100% cellular marrow with increased megakaryocytes and storage iron and only occasional blasts on CD34 staining (morphologic blast count 1%).  Jak2 V617F mutation was again detected without CalR or Mpl exon 10 mutations.    Hence, she was started on  ruxolitinib 20 mg bid 2/2015.     Review of Systems   Constitutional: Positive for fatigue. Negative for appetite change (better on megace), diaphoresis and fever.   HENT: Positive for postnasal drip. Negative for mouth sores, nosebleeds and sore throat.    Respiratory: Positive for shortness of breath. Negative for cough.    Cardiovascular: Negative for chest pain and leg swelling.   Gastrointestinal: Negative for abdominal pain, blood in stool, constipation, diarrhea and nausea.   Endocrine: Positive for polydipsia and polyuria.   Genitourinary: Positive for frequency. Negative for dysuria, hematuria and vaginal bleeding (menopause at age 65).   Musculoskeletal: Negative for arthralgias, back pain and joint swelling.   Skin: Negative for rash.   Neurological: Positive for weakness. Negative for tremors, light-headedness and headaches.   Psychiatric/Behavioral: Negative for confusion. The patient is not nervous/anxious.        Objective:      Physical Exam   Constitutional: She is oriented to person, place, and time. She appears well-developed and well-nourished. No distress.   Elderly female in no distress   HENT:   Head: Normocephalic and atraumatic.   Mouth/Throat: No oropharyngeal exudate.   Eyes: Conjunctivae are normal. Pupils are equal, round, and reactive to light.   Neck: Neck supple.   Cardiovascular: Normal rate and regular rhythm.    Pulmonary/Chest: Effort normal and breath sounds normal. She has no rales.   Abdominal: Soft. Bowel sounds are normal. She exhibits no mass. There is no hepatosplenomegaly. There is no tenderness.   Musculoskeletal: Normal range of motion. She exhibits no edema.   Lymphadenopathy:     She has no cervical adenopathy.     She has no axillary adenopathy.        Right: No inguinal adenopathy present.        Left: No inguinal adenopathy present.   Neurological: She is alert and oriented to person, place, and time.   Skin: Skin is warm and dry. No rash noted. There is pallor.    Psychiatric: She has a normal mood and affect. Thought content normal.       Assessment:       1. 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 A repeat marrow on 1/26/16 showed a 100% cellular marrow with increased megakaryocytes and storage iron and only occasional blasts on CD34 staining (morphologic blast count 1%).  Jak2 V617F mutation was again detected without CalR or Mpl exon 10 mutations.    Hence, she was started on ruxolitinib 20 mg bid 2/2016.  Her WBC had been controlled since that time but all of her counts have fallen significantly today with WBC 16K, hemoglobin 5 and platelets 81K.  Hence, she will need RBC transfusion and will receive it tomorrow.  She had not needed any blood transfusions previously since her visit in early 3/2016.  We will also decrease her ruxolitinib to 10 mg BID.  Once pacrinitinib is approved this may be a better agent given her anemia.    Her hyperglycemia has improved and there is a family history of late onset diabetes.  Her blood sugar is 90 today with creatinine 2.1, though I don't think her decline in renal function contributes to her lower blood counts or alters her relative ruxolitinib level currently.  Dr. Pacheco, her primary care attending, will continue to follow up on this issue.    All of her questions were answered in the clinic today and she will follow up in 3 weeks.

## 2017-10-09 NOTE — LETTER
October 15, 2017        Florencio Pacheco MD  [address]  [place] [address]             [facility]  [address]  [facility] [address]  Phone: [phone]   Patient: Courtney Hammer   MR Number: [id number]   YOB: 1940   Date of Visit: 10/9/2017       Dear Dr. Pacheco:    Thank you for referring Courtney Hammer to me for evaluation. Below are the relevant portions of my assessment and plan of care.        1. 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 A repeat marrow on 1/26/16 showed a 100% cellular marrow with increased megakaryocytes and storage iron and only occasional blasts on CD34 staining (morphologic blast count 1%).  Jak2 V617F mutation was again detected without CalR or Mpl exon 10 mutations.    Hence, she was started on ruxolitinib 20 mg bid 2/2016.  Her WBC had been controlled since that time but all of her counts have fallen significantly today with WBC 16K, hemoglobin 5 and platelets 81K.  Hence, she will need RBC transfusion and will receive it tomorrow.  She had not needed any blood transfusions previously since her visit in early 3/2016.  We will also decrease her ruxolitinib to 10 mg BID.  Once pacrinitinib is approved this may be a better agent given her anemia.    Her hyperglycemia has improved and there is a family history of late onset diabetes.  Her blood sugar is 90 today with creatinine 2.1, though I don't think her decline in renal function contributes to her lower blood counts or alters her relative ruxolitinib level currently.  Dr. Pacheco, her primary care attending, will continue to follow up on this issue.    All of her questions were answered in the clinic today and she will follow up in 3 weeks.     If you have questions, please do not hesitate to call me. I look forward to  following Courtney along with you.    Sincerely,      Rico Daniels MD           CC  No Recipients

## 2017-10-10 ENCOUNTER — INFUSION (OUTPATIENT)
Dept: INFUSION THERAPY | Facility: HOSPITAL | Age: 77
End: 2017-10-10
Attending: INTERNAL MEDICINE
Payer: MEDICARE

## 2017-10-10 VITALS
RESPIRATION RATE: 18 BRPM | TEMPERATURE: 99 F | DIASTOLIC BLOOD PRESSURE: 60 MMHG | HEART RATE: 97 BPM | SYSTOLIC BLOOD PRESSURE: 122 MMHG

## 2017-10-10 DIAGNOSIS — T45.1X5A ANEMIA DUE TO ANTINEOPLASTIC CHEMOTHERAPY: ICD-10-CM

## 2017-10-10 DIAGNOSIS — D64.81 ANEMIA DUE TO ANTINEOPLASTIC CHEMOTHERAPY: ICD-10-CM

## 2017-10-10 LAB
BLD PROD TYP BPU: NORMAL
BLD PROD TYP BPU: NORMAL
BLOOD UNIT EXPIRATION DATE: NORMAL
BLOOD UNIT EXPIRATION DATE: NORMAL
BLOOD UNIT TYPE CODE: 5100
BLOOD UNIT TYPE CODE: 5100
BLOOD UNIT TYPE: NORMAL
BLOOD UNIT TYPE: NORMAL
CODING SYSTEM: NORMAL
CODING SYSTEM: NORMAL
DISPENSE STATUS: NORMAL
DISPENSE STATUS: NORMAL
NUM UNITS TRANS PACKED RBC: NORMAL
NUM UNITS TRANS PACKED RBC: NORMAL

## 2017-10-10 PROCEDURE — 25000003 PHARM REV CODE 250: Performed by: INTERNAL MEDICINE

## 2017-10-10 PROCEDURE — P9038 RBC IRRADIATED: HCPCS

## 2017-10-10 PROCEDURE — 86920 COMPATIBILITY TEST SPIN: CPT

## 2017-10-10 PROCEDURE — 36430 TRANSFUSION BLD/BLD COMPNT: CPT

## 2017-10-10 RX ORDER — DIPHENHYDRAMINE HCL 25 MG
25 CAPSULE ORAL
Status: COMPLETED | OUTPATIENT
Start: 2017-10-10 | End: 2017-10-10

## 2017-10-10 RX ORDER — ACETAMINOPHEN 325 MG/1
650 TABLET ORAL
Status: COMPLETED | OUTPATIENT
Start: 2017-10-10 | End: 2017-10-10

## 2017-10-10 RX ORDER — HYDROCODONE BITARTRATE AND ACETAMINOPHEN 500; 5 MG/1; MG/1
TABLET ORAL ONCE
Status: COMPLETED | OUTPATIENT
Start: 2017-10-10 | End: 2017-10-10

## 2017-10-10 RX ADMIN — ACETAMINOPHEN 650 MG: 325 TABLET ORAL at 01:10

## 2017-10-10 RX ADMIN — SODIUM CHLORIDE: 900 INJECTION, SOLUTION INTRAVENOUS at 02:10

## 2017-10-10 RX ADMIN — DIPHENHYDRAMINE HYDROCHLORIDE 25 MG: 25 CAPSULE ORAL at 01:10

## 2017-10-10 NOTE — PLAN OF CARE
Problem: Patient Care Overview  Goal: Plan of Care Review  Outcome: Ongoing (interventions implemented as appropriate)  Pt received 2 U PRBCs with no complications. VSS. Pt instructed to call MD with any problems. AVS given to patient and patient discharged home with family at side.

## 2017-10-19 ENCOUNTER — TELEPHONE (OUTPATIENT)
Dept: PHARMACY | Facility: CLINIC | Age: 77
End: 2017-10-19

## 2017-10-19 NOTE — TELEPHONE ENCOUNTER
Patient's daughter returned phone call back regarding specialty medication refill for JAKAFI 20mg. She informed the provider changed the direction to take 1 tablet 20mg by mouth daily & she have enough medication until 11/20/2017. She would like for [facility] to reach out to her 1 week before on Mon 11/13/2017 for the medication refill.

## 2017-10-25 DIAGNOSIS — D47.3 ESSENTIAL THROMBOCYTHEMIA: Primary | ICD-10-CM

## 2017-11-06 ENCOUNTER — OFFICE VISIT (OUTPATIENT)
Dept: HEMATOLOGY/ONCOLOGY | Facility: CLINIC | Age: 77
End: 2017-11-06
Payer: MEDICARE

## 2017-11-06 ENCOUNTER — LAB VISIT (OUTPATIENT)
Dept: LAB | Facility: HOSPITAL | Age: 77
End: 2017-11-06
Attending: INTERNAL MEDICINE
Payer: MEDICARE

## 2017-11-06 VITALS
WEIGHT: 96.56 LBS | DIASTOLIC BLOOD PRESSURE: 62 MMHG | HEART RATE: 108 BPM | TEMPERATURE: 99 F | OXYGEN SATURATION: 97 % | BODY MASS INDEX: 18.23 KG/M2 | HEIGHT: 61 IN | SYSTOLIC BLOOD PRESSURE: 132 MMHG

## 2017-11-06 DIAGNOSIS — D47.3 ESSENTIAL THROMBOCYTHEMIA: Primary | ICD-10-CM

## 2017-11-06 DIAGNOSIS — T45.1X5A ANEMIA DUE TO ANTINEOPLASTIC CHEMOTHERAPY: ICD-10-CM

## 2017-11-06 DIAGNOSIS — D64.81 ANEMIA DUE TO ANTINEOPLASTIC CHEMOTHERAPY: ICD-10-CM

## 2017-11-06 DIAGNOSIS — D47.3 ESSENTIAL THROMBOCYTHEMIA: ICD-10-CM

## 2017-11-06 LAB
ALBUMIN SERPL BCP-MCNC: 3.7 G/DL
ALP SERPL-CCNC: 76 U/L
ALT SERPL W/O P-5'-P-CCNC: 38 U/L
ANION GAP SERPL CALC-SCNC: 8 MMOL/L
ANISOCYTOSIS BLD QL SMEAR: SLIGHT
AST SERPL-CCNC: 40 U/L
BASOPHILS NFR BLD: 0 %
BILIRUB SERPL-MCNC: 0.6 MG/DL
BUN SERPL-MCNC: 28 MG/DL
CALCIUM SERPL-MCNC: 9.6 MG/DL
CHLORIDE SERPL-SCNC: 106 MMOL/L
CO2 SERPL-SCNC: 27 MMOL/L
CREAT SERPL-MCNC: 1.7 MG/DL
DIFFERENTIAL METHOD: ABNORMAL
DOHLE BOD BLD QL SMEAR: PRESENT
EOSINOPHIL NFR BLD: 1 %
ERYTHROCYTE [DISTWIDTH] IN BLOOD BY AUTOMATED COUNT: 20.8 %
EST. GFR  (AFRICAN AMERICAN): 33.3 ML/MIN/1.73 M^2
EST. GFR  (NON AFRICAN AMERICAN): 28.9 ML/MIN/1.73 M^2
GIANT PLATELETS BLD QL SMEAR: PRESENT
GLUCOSE SERPL-MCNC: 75 MG/DL
HCT VFR BLD AUTO: 21.5 %
HGB BLD-MCNC: 6.7 G/DL
HYPOCHROMIA BLD QL SMEAR: ABNORMAL
IMM GRANULOCYTES # BLD AUTO: ABNORMAL K/UL
IMM GRANULOCYTES NFR BLD AUTO: ABNORMAL %
LYMPHOCYTES NFR BLD: 9 %
MCH RBC QN AUTO: 29.4 PG
MCHC RBC AUTO-ENTMCNC: 31.2 G/DL
MCV RBC AUTO: 94 FL
METAMYELOCYTES NFR BLD MANUAL: 4 %
MONOCYTES NFR BLD: 1 %
MYELOCYTES NFR BLD MANUAL: 2 %
NEUTROPHILS NFR BLD: 75 %
NEUTS BAND NFR BLD MANUAL: 5 %
NRBC BLD-RTO: 5 /100 WBC
OVALOCYTES BLD QL SMEAR: ABNORMAL
PLATELET # BLD AUTO: 212 K/UL
PMV BLD AUTO: 11.4 FL
POIKILOCYTOSIS BLD QL SMEAR: SLIGHT
POLYCHROMASIA BLD QL SMEAR: ABNORMAL
POTASSIUM SERPL-SCNC: 4.8 MMOL/L
PROT SERPL-MCNC: 8.7 G/DL
RBC # BLD AUTO: 2.28 M/UL
SODIUM SERPL-SCNC: 141 MMOL/L
WBC # BLD AUTO: 32.53 K/UL
WBC OTHER NFR BLD MANUAL: 3 %

## 2017-11-06 PROCEDURE — 99999 PR PBB SHADOW E&M-EST. PATIENT-LVL IV: CPT | Mod: PBBFAC,,, | Performed by: INTERNAL MEDICINE

## 2017-11-06 PROCEDURE — 99213 OFFICE O/P EST LOW 20 MIN: CPT | Mod: S$PBB,,, | Performed by: INTERNAL MEDICINE

## 2017-11-06 PROCEDURE — 85007 BL SMEAR W/DIFF WBC COUNT: CPT

## 2017-11-06 PROCEDURE — 85027 COMPLETE CBC AUTOMATED: CPT

## 2017-11-06 PROCEDURE — 85060 BLOOD SMEAR INTERPRETATION: CPT | Mod: ,,, | Performed by: PATHOLOGY

## 2017-11-06 PROCEDURE — 80053 COMPREHEN METABOLIC PANEL: CPT

## 2017-11-06 PROCEDURE — 36415 COLL VENOUS BLD VENIPUNCTURE: CPT

## 2017-11-06 PROCEDURE — 99214 OFFICE O/P EST MOD 30 MIN: CPT | Mod: PBBFAC | Performed by: INTERNAL MEDICINE

## 2017-11-06 RX ORDER — PROMETHAZINE HYDROCHLORIDE 6.25 MG/5ML
SYRUP ORAL
Refills: 1 | Status: ON HOLD | COMMUNITY
Start: 2017-08-27 | End: 2018-02-21 | Stop reason: CLARIF

## 2017-11-06 RX ORDER — PNEUMOCOCCAL 13-VALENT CONJUGATE VACCINE 2.2; 2.2; 2.2; 2.2; 2.2; 4.4; 2.2; 2.2; 2.2; 2.2; 2.2; 2.2; 2.2 UG/.5ML; UG/.5ML; UG/.5ML; UG/.5ML; UG/.5ML; UG/.5ML; UG/.5ML; UG/.5ML; UG/.5ML; UG/.5ML; UG/.5ML; UG/.5ML; UG/.5ML
INJECTION, SUSPENSION INTRAMUSCULAR
Status: ON HOLD | COMMUNITY
Start: 2017-10-02 | End: 2018-01-03 | Stop reason: HOSPADM

## 2017-11-06 RX ORDER — LINAGLIPTIN 5 MG/1
TABLET, FILM COATED ORAL
Refills: 11 | Status: ON HOLD | COMMUNITY
Start: 2017-09-16 | End: 2018-02-21

## 2017-11-06 RX ORDER — RUXOLITINIB 20 MG/1
TABLET ORAL
Status: ON HOLD | COMMUNITY
Start: 2017-09-27 | End: 2018-01-03 | Stop reason: HOSPADM

## 2017-11-06 NOTE — PROGRESS NOTES
Subjective:       Patient ID: Courtney Hammer is a 76 y.o. female.    Chief Complaint: No chief complaint on file.    HPI  Mrs. Hammer is here for follow up of a Jak2 V617F positive myeloproliferative disorder now on ruxolitinib with dose cut to 10 mg bid on 10/9/17. Her weakness has improved and both her platelet and WBC have risen.  Too early to say whether there is a trend to rise for her hemoglobin on its own.   She had not required a blood transfusion since 3/2016 but she was transfused 2 U PRBC on 10/9/17.      No fever or other new problems.  She is active despite her anemia.    History:  She was first noted to have an elevated platelet count in early 2011 with CBC 6/7/11 showing WBC 56.9K, Hb 12.0, Plt 1090K (74P, 21L).  On 7/8/11 she had a marrow biopsy showing 90% cellularity with megakaryocyte hyperplasia with dysmorphic features. Myeloid hyperplasia with left shift. No lymphoid infiltrate or increased blasts. Flow cytometry showed virtually no B cells but no other concerning features. Cytogentics 46,XY[20]. Bcr-abl negative. Testing for Jak2 V617F on 7/7/15 was positive in 5% of total Jak2 DNA. Additional testing that same day for CalR and Mpl exon 10 mutation were both negative. Hence, she has Jak2+ ET.    She was started on hydroxyurea and ASA for essential thrombocythemia and had been stable on this until she began to require blood transfusions early in 2015. Her RBC transfusion needs have slowly increased and she has been continued on 1500 mg hydroxyurea, her same dose for many years. This has been decreased but her transfusion needs continue along with an alarming increase in her WBC.    CBC 6/18/15 WBC 31.8K, hemoglobin 4.4, platelets 212K. She received 4U PRBC and CBC on 6/30 WBC 23K, hemoglobin 10.3, platelets 97K.  A repeat marrow on 1/26/16 showed a 100% cellular marrow with increased megakaryocytes and storage iron and only occasional blasts on CD34 staining (morphologic blast count 1%).  Jak2 V617F  mutation was again detected without CalR or Mpl exon 10 mutations.    Hence, she was started on ruxolitinib 20 mg bid 2/2015.  This was cut to 10 mg bid as of 10/9/17.    Review of Systems   Constitutional: Positive for fatigue. Negative for appetite change (better on megace), diaphoresis and fever.   HENT: Positive for postnasal drip. Negative for mouth sores, nosebleeds and sore throat.    Respiratory: Positive for shortness of breath. Negative for cough.    Cardiovascular: Negative for chest pain and leg swelling.   Gastrointestinal: Negative for abdominal pain, blood in stool, constipation, diarrhea and nausea.   Endocrine: Positive for polydipsia and polyuria.   Genitourinary: Positive for frequency. Negative for dysuria, hematuria and vaginal bleeding (menopause at age 65).   Musculoskeletal: Negative for arthralgias, back pain and joint swelling.   Skin: Negative for rash.   Neurological: Positive for weakness. Negative for tremors, light-headedness and headaches.   Psychiatric/Behavioral: Negative for confusion. The patient is not nervous/anxious.        Objective:      Physical Exam   Constitutional: She is oriented to person, place, and time. She appears well-developed and well-nourished. No distress.   Elderly female in no distress   HENT:   Head: Normocephalic and atraumatic.   Mouth/Throat: No oropharyngeal exudate.   Eyes: Conjunctivae are normal. Pupils are equal, round, and reactive to light.   Neck: Neck supple.   Cardiovascular: Normal rate and regular rhythm.    Pulmonary/Chest: Effort normal and breath sounds normal. She has no rales.   Abdominal: Soft. Bowel sounds are normal. She exhibits no mass. There is no hepatosplenomegaly. There is no tenderness.   Musculoskeletal: Normal range of motion. She exhibits no edema.   Lymphadenopathy:     She has no cervical adenopathy.     She has no axillary adenopathy.        Right: No inguinal adenopathy present.        Left: No inguinal adenopathy  present.   Neurological: She is alert and oriented to person, place, and time.   Skin: Skin is warm and dry. No rash noted. There is pallor.   Psychiatric: She has a normal mood and affect. Thought content normal.       Assessment:       1. 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 A repeat marrow on 1/26/16 showed a 100% cellular marrow with increased megakaryocytes and storage iron and only occasional blasts on CD34 staining (morphologic blast count 1%).  Jak2 V617F mutation was again detected without CalR or Mpl exon 10 mutations.    Hence, she was started on ruxolitinib 20 mg bid 2/2016.  Her WBC had been controlled since that time but all of her counts fell significantly but 10/9/17 with WBC 16K, hemoglobin 5 and platelets 81K.  Hence, she received 2U RBC ruxolitinib was decreased to 10 mg BID.  Once pacrinitinib is approved this may be a better agent given her anemia.  We will check her counts again next week to assess whether there is a steady rise in her hemoglobin or if she will need transfusion again.    Her hyperglycemia has improved and there is a family history of late onset diabetes.  Her blood sugar is 75 today with creatinine 1.7, though I don't think her decline in renal function contributes to her lower blood counts or alters her relative ruxolitinib level currently.  Dr. Pacheco, her primary care attending, will continue to follow up on this issue.    All of her questions were answered in the clinic today and she will follow up in 4 weeks with weekly CBC and potential transfusion.

## 2017-11-07 LAB — PATH REV BLD -IMP: NORMAL

## 2017-11-13 ENCOUNTER — TELEPHONE (OUTPATIENT)
Dept: PHARMACY | Facility: CLINIC | Age: 77
End: 2017-11-13

## 2017-11-14 ENCOUNTER — TELEPHONE (OUTPATIENT)
Dept: HEMATOLOGY/ONCOLOGY | Facility: CLINIC | Age: 77
End: 2017-11-14

## 2017-11-14 NOTE — TELEPHONE ENCOUNTER
----- Message from Kinga Reynolds MA sent at 11/13/2017 11:57 AM CST -----  Regarding: FW: jakafi current dose      ----- Message -----  From: Lyric Prather, Patient Care Assistant  Sent: 11/13/2017  11:16 AM  To: Frances G[facility]mez Staff  Subject: jakafi current dose                              Pt's souse and daughter state that Ms. Daniels is going to be on a new dose.     Can you please clarify..   We currently have a script on file for     Jakafi 10 mg 1 t po bid    Thank you,  Lyric Prather CPhT [facility]  Patient Care Assistant  [phone] ##option 1  fax: [phone]

## 2017-11-14 NOTE — TELEPHONE ENCOUNTER
Returned call to pharmacy. According to Dr Daniels note, her Jakafi has been cut to 10mg. This has already been verified and is in the process of being shipped to the patient.

## 2017-11-16 ENCOUNTER — TELEPHONE (OUTPATIENT)
Dept: HEMATOLOGY/ONCOLOGY | Facility: CLINIC | Age: 77
End: 2017-11-16

## 2017-11-16 NOTE — TELEPHONE ENCOUNTER
----- Message from Derrick Almeida sent at 11/16/2017 12:03 PM CST -----  Contact: Tegan-Duy   Will like a call from office regarding upcoming appts     Will like to know why pt is coming every week     Contact::[phone]

## 2017-12-04 ENCOUNTER — OFFICE VISIT (OUTPATIENT)
Dept: HEMATOLOGY/ONCOLOGY | Facility: CLINIC | Age: 77
End: 2017-12-04
Payer: MEDICARE

## 2017-12-04 ENCOUNTER — LAB VISIT (OUTPATIENT)
Dept: LAB | Facility: HOSPITAL | Age: 77
End: 2017-12-04
Attending: INTERNAL MEDICINE
Payer: MEDICARE

## 2017-12-04 VITALS
HEIGHT: 61 IN | RESPIRATION RATE: 18 BRPM | OXYGEN SATURATION: 99 % | BODY MASS INDEX: 18.69 KG/M2 | DIASTOLIC BLOOD PRESSURE: 56 MMHG | WEIGHT: 99 LBS | TEMPERATURE: 99 F | SYSTOLIC BLOOD PRESSURE: 126 MMHG | HEART RATE: 106 BPM

## 2017-12-04 DIAGNOSIS — D64.81 ANEMIA DUE TO ANTINEOPLASTIC CHEMOTHERAPY: ICD-10-CM

## 2017-12-04 DIAGNOSIS — D47.3 ESSENTIAL THROMBOCYTHEMIA: ICD-10-CM

## 2017-12-04 DIAGNOSIS — T45.1X5A ANEMIA DUE TO ANTINEOPLASTIC CHEMOTHERAPY: ICD-10-CM

## 2017-12-04 DIAGNOSIS — D47.3 ESSENTIAL THROMBOCYTHEMIA: Primary | ICD-10-CM

## 2017-12-04 LAB
ABO + RH BLD: NORMAL
ALBUMIN SERPL BCP-MCNC: 3.7 G/DL
ALP SERPL-CCNC: 87 U/L
ALT SERPL W/O P-5'-P-CCNC: 33 U/L
ANION GAP SERPL CALC-SCNC: 8 MMOL/L
ANISOCYTOSIS BLD QL SMEAR: SLIGHT
AST SERPL-CCNC: 33 U/L
BASOPHILS NFR BLD: 0 %
BILIRUB SERPL-MCNC: 0.5 MG/DL
BLASTS NFR BLD MANUAL: 2 %
BLD GP AB SCN CELLS X3 SERPL QL: NORMAL
BUN SERPL-MCNC: 29 MG/DL
CALCIUM SERPL-MCNC: 9.9 MG/DL
CHLORIDE SERPL-SCNC: 105 MMOL/L
CO2 SERPL-SCNC: 27 MMOL/L
CREAT SERPL-MCNC: 1.6 MG/DL
DIFFERENTIAL METHOD: ABNORMAL
EOSINOPHIL NFR BLD: 0 %
ERYTHROCYTE [DISTWIDTH] IN BLOOD BY AUTOMATED COUNT: 20.3 %
EST. GFR  (AFRICAN AMERICAN): 35.6 ML/MIN/1.73 M^2
EST. GFR  (NON AFRICAN AMERICAN): 30.9 ML/MIN/1.73 M^2
GIANT PLATELETS BLD QL SMEAR: PRESENT
GLUCOSE SERPL-MCNC: 102 MG/DL
HCT VFR BLD AUTO: 23.1 %
HGB BLD-MCNC: 7.4 G/DL
IMM GRANULOCYTES # BLD AUTO: ABNORMAL K/UL
IMM GRANULOCYTES NFR BLD AUTO: ABNORMAL %
LYMPHOCYTES NFR BLD: 14 %
MCH RBC QN AUTO: 31.2 PG
MCHC RBC AUTO-ENTMCNC: 32 G/DL
MCV RBC AUTO: 98 FL
METAMYELOCYTES NFR BLD MANUAL: 3 %
MONOCYTES NFR BLD: 1 %
MYELOCYTES NFR BLD MANUAL: 2 %
NEUTROPHILS NFR BLD: 72 %
NEUTS BAND NFR BLD MANUAL: 6 %
NRBC BLD-RTO: 3 /100 WBC
OVALOCYTES BLD QL SMEAR: ABNORMAL
PLATELET # BLD AUTO: 188 K/UL
PMV BLD AUTO: 11.6 FL
POIKILOCYTOSIS BLD QL SMEAR: SLIGHT
POLYCHROMASIA BLD QL SMEAR: ABNORMAL
POTASSIUM SERPL-SCNC: 3.8 MMOL/L
PROT SERPL-MCNC: 8.5 G/DL
RBC # BLD AUTO: 2.37 M/UL
SODIUM SERPL-SCNC: 140 MMOL/L
WBC # BLD AUTO: 36.43 K/UL

## 2017-12-04 PROCEDURE — 36415 COLL VENOUS BLD VENIPUNCTURE: CPT

## 2017-12-04 PROCEDURE — 85027 COMPLETE CBC AUTOMATED: CPT

## 2017-12-04 PROCEDURE — 85007 BL SMEAR W/DIFF WBC COUNT: CPT

## 2017-12-04 PROCEDURE — 99213 OFFICE O/P EST LOW 20 MIN: CPT | Mod: S$PBB,,, | Performed by: INTERNAL MEDICINE

## 2017-12-04 PROCEDURE — 80053 COMPREHEN METABOLIC PANEL: CPT

## 2017-12-04 PROCEDURE — 85060 BLOOD SMEAR INTERPRETATION: CPT | Mod: ,,, | Performed by: PATHOLOGY

## 2017-12-04 PROCEDURE — 86901 BLOOD TYPING SEROLOGIC RH(D): CPT

## 2017-12-04 PROCEDURE — 99999 PR PBB SHADOW E&M-EST. PATIENT-LVL IV: CPT | Mod: PBBFAC,,, | Performed by: INTERNAL MEDICINE

## 2017-12-04 PROCEDURE — 99214 OFFICE O/P EST MOD 30 MIN: CPT | Mod: PBBFAC | Performed by: INTERNAL MEDICINE

## 2017-12-04 NOTE — LETTER
December 4, 2017        Florencio Pacheco MD  [address]  [place] [address]             [facility]  [address]  [facility] [address]  Phone: [phone]   Patient: Courtney Hammer   MR Number: [id number]   YOB: 1940   Date of Visit: 12/4/2017       Dear Dr. Pacheco:    Thank you for referring Courtney Hammer to me for evaluation. Below are the relevant portions of my assessment and plan of care.        1. 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 A repeat marrow on 1/26/16 showed a 100% cellular marrow with increased megakaryocytes and storage iron and only occasional blasts on CD34 staining (morphologic blast count 1%).  Jak2 V617F mutation was again detected without CalR or Mpl exon 10 mutations.    Hence, she was started on ruxolitinib 20 mg bid 2/2016.  Her WBC had been controlled since that time but all of her counts fell significantly but 10/9/17 with WBC 16K, hemoglobin 5 and platelets 81K.  Hence, she received 2U RBC ruxolitinib was decreased to 10 mg BID with improvement in her counts and acceptable WBC (36K today).  Once pacrinitinib is approved this may be a better agent given her anemia.      Her hyperglycemia has improved and there is a family history of late onset diabetes.  Her blood sugar is 102 today with creatinine 1.6, though I don't think her decline in renal function contributes to her lower blood counts or alters her relative ruxolitinib level currently.  Dr. Pacheco, her primary care attending, will continue to follow up on her diabetes.    All of her questions were answered in the clinic today and she will follow up in 6 weeks with Dr. Parmar with biweekly CBC in the interim.     If you have questions, please do not hesitate to call me. I look forward to following Courtney vasquez with  you.    Sincerely,      MD OMARI Marr MD

## 2017-12-04 NOTE — PROGRESS NOTES
Subjective:       Patient ID: Courtney Hammer is a 77 y.o. female.    Chief Complaint: No chief complaint on file.    HPI  Mrs. Hammer is here for follow up of a Jak2 V617F positive myeloproliferative disorder now on ruxolitinib with dose cut to 10 mg bid on 10/9/17. Her weakness has improved and both her platelet and WBC have risen.  Too early to say whether there is a trend to rise for her hemoglobin on its own.   She had not required a blood transfusion since 3/2016 but she was transfused 3 U PRBC on 10/9/17-11/20/17.  None since then.    No fever or other new problems.  She is active despite her anemia.  Diabetes now controlled.    History:  She was first noted to have an elevated platelet count in early 2011 with CBC 6/7/11 showing WBC 56.9K, Hb 12.0, Plt 1090K (74P, 21L).  On 7/8/11 she had a marrow biopsy showing 90% cellularity with megakaryocyte hyperplasia with dysmorphic features. Myeloid hyperplasia with left shift. No lymphoid infiltrate or increased blasts. Flow cytometry showed virtually no B cells but no other concerning features. Cytog[employer]ics 46,XY[20]. Bcr-abl negative. Testing for Jak2 V617F on 7/7/15 was positive in 5% of total Jak2 DNA. Additional testing that same day for CalR and Mpl exon 10 mutation were both negative. Hence, she has Jak2+ ET.    She was started on hydroxyurea and ASA for essential thrombocythemia and had been stable on this until she began to require blood transfusions early in 2015. Her RBC transfusion needs have slowly increased and she has been continued on 1500 mg hydroxyurea, her same dose for many years. This has been decreased but her transfusion needs continue along with an alarming increase in her WBC.    CBC 6/18/15 WBC 31.8K, hemoglobin 4.4, platelets 212K. She received 4U PRBC and CBC on 6/30 WBC 23K, hemoglobin 10.3, platelets 97K.  A repeat marrow on 1/26/16 showed a 100% cellular marrow with increased megakaryocytes and storage iron and only occasional blasts on CD34  staining (morphologic blast count 1%).  Jak2 V617F mutation was again detected without CalR or Mpl exon 10 mutations.    Hence, she was started on ruxolitinib 20 mg bid 2/2015.  This was cut to 10 mg bid as of 10/9/17.    Review of Systems   Constitutional: Positive for fatigue. Negative for appetite change (better on megace), diaphoresis and fever.   HENT: Positive for postnasal drip. Negative for mouth sores, nosebleeds and sore throat.    Respiratory: Positive for shortness of breath. Negative for cough.    Cardiovascular: Negative for chest pain and leg swelling.   Gastrointestinal: Negative for abdominal pain, blood in stool, constipation, diarrhea and nausea.   Endocrine: Positive for polydipsia and polyuria.   Genitourinary: Positive for frequency. Negative for dysuria, hematuria and vaginal bleeding (menopause at age 65).   Musculoskeletal: Negative for arthralgias, back pain and joint swelling.   Skin: Negative for rash.   Neurological: Positive for weakness. Negative for tremors, light-headedness and headaches.   Psychiatric/Behavioral: Negative for confusion. The patient is not nervous/anxious.        Objective:      Physical Exam   Constitutional: She is oriented to person, place, and time. She appears well-developed and well-nourished. No distress.   Elderly female in no distress   HENT:   Head: Normocephalic and atraumatic.   Mouth/Throat: No oropharyngeal exudate.   Eyes: Conjunctivae are normal. Pupils are equal, round, and reactive to light.   Neck: Neck supple.   Cardiovascular: Normal rate and regular rhythm.    Pulmonary/Chest: Effort normal and breath sounds normal. She has no rales.   Abdominal: Soft. Bowel sounds are normal. She exhibits no mass. There is no hepatosplenomegaly. There is no tenderness.   Musculoskeletal: Normal range of motion. She exhibits no edema.   Lymphadenopathy:     She has no cervical adenopathy.     She has no axillary adenopathy.        Right: No inguinal adenopathy  present.        Left: No inguinal adenopathy present.   Neurological: She is alert and oriented to person, place, and time.   Skin: Skin is warm and dry. No rash noted. There is pallor.   Psychiatric: She has a normal mood and affect. Thought content normal.       Assessment:       1. 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 A repeat marrow on 1/26/16 showed a 100% cellular marrow with increased megakaryocytes and storage iron and only occasional blasts on CD34 staining (morphologic blast count 1%).  Jak2 V617F mutation was again detected without CalR or Mpl exon 10 mutations.    Hence, she was started on ruxolitinib 20 mg bid 2/2016.  Her WBC had been controlled since that time but all of her counts fell significantly but 10/9/17 with WBC 16K, hemoglobin 5 and platelets 81K.  Hence, she received 2U RBC ruxolitinib was decreased to 10 mg BID with improvement in her counts and acceptable WBC (36K today).  Once pacrinitinib is approved this may be a better agent given her anemia.      Her hyperglycemia has improved and there is a family history of late onset diabetes.  Her blood sugar is 102 today with creatinine 1.6, though I don't think her decline in renal function contributes to her lower blood counts or alters her relative ruxolitinib level currently.  Dr. Pacheco, her primary care attending, will continue to follow up on her diabetes.    All of her questions were answered in the clinic today and she will follow up in 6 weeks with Dr. Parmar with biweekly CBC in the interim.

## 2017-12-05 LAB — PATH REV BLD -IMP: NORMAL

## 2017-12-06 ENCOUNTER — TELEPHONE (OUTPATIENT)
Dept: PHARMACY | Facility: CLINIC | Age: 77
End: 2017-12-06

## 2017-12-18 ENCOUNTER — LAB VISIT (OUTPATIENT)
Dept: LAB | Facility: HOSPITAL | Age: 77
End: 2017-12-18
Attending: INTERNAL MEDICINE
Payer: MEDICARE

## 2017-12-18 DIAGNOSIS — D47.3 ESSENTIAL THROMBOCYTHEMIA: ICD-10-CM

## 2017-12-18 DIAGNOSIS — T45.1X5A ANEMIA DUE TO ANTINEOPLASTIC CHEMOTHERAPY: ICD-10-CM

## 2017-12-18 DIAGNOSIS — D64.81 ANEMIA DUE TO ANTINEOPLASTIC CHEMOTHERAPY: ICD-10-CM

## 2017-12-18 LAB
ANISOCYTOSIS BLD QL SMEAR: SLIGHT
BASOPHILS # BLD AUTO: ABNORMAL K/UL
BASOPHILS NFR BLD: 0 %
DIFFERENTIAL METHOD: ABNORMAL
EOSINOPHIL # BLD AUTO: ABNORMAL K/UL
EOSINOPHIL NFR BLD: 0 %
ERYTHROCYTE [DISTWIDTH] IN BLOOD BY AUTOMATED COUNT: 19.6 %
HCT VFR BLD AUTO: 25.7 %
HGB BLD-MCNC: 8.3 G/DL
HYPOCHROMIA BLD QL SMEAR: ABNORMAL
IMM GRANULOCYTES # BLD AUTO: ABNORMAL K/UL
IMM GRANULOCYTES NFR BLD AUTO: ABNORMAL %
LYMPHOCYTES # BLD AUTO: ABNORMAL K/UL
LYMPHOCYTES NFR BLD: 15 %
MCH RBC QN AUTO: 31.8 PG
MCHC RBC AUTO-ENTMCNC: 32.3 G/DL
MCV RBC AUTO: 99 FL
METAMYELOCYTES NFR BLD MANUAL: 4 %
MONOCYTES # BLD AUTO: ABNORMAL K/UL
MONOCYTES NFR BLD: 2 %
MYELOCYTES NFR BLD MANUAL: 1 %
NEUTROPHILS NFR BLD: 68 %
NEUTS BAND NFR BLD MANUAL: 10 %
NRBC BLD-RTO: 2 /100 WBC
OVALOCYTES BLD QL SMEAR: ABNORMAL
PLATELET # BLD AUTO: 224 K/UL
PLATELET BLD QL SMEAR: ABNORMAL
PMV BLD AUTO: 11.7 FL
POIKILOCYTOSIS BLD QL SMEAR: SLIGHT
POLYCHROMASIA BLD QL SMEAR: ABNORMAL
RBC # BLD AUTO: 2.61 M/UL
SCHISTOCYTES BLD QL SMEAR: ABNORMAL
WBC # BLD AUTO: 30.85 K/UL

## 2017-12-18 PROCEDURE — 36415 COLL VENOUS BLD VENIPUNCTURE: CPT

## 2017-12-18 PROCEDURE — 85007 BL SMEAR W/DIFF WBC COUNT: CPT

## 2017-12-18 PROCEDURE — 85027 COMPLETE CBC AUTOMATED: CPT

## 2017-12-29 ENCOUNTER — TELEPHONE (OUTPATIENT)
Dept: HEMATOLOGY/ONCOLOGY | Facility: CLINIC | Age: 77
End: 2017-12-29

## 2017-12-29 NOTE — TELEPHONE ENCOUNTER
Message fwd to .     ----- Message from Trish Arauz sent at 12/29/2017  9:21 AM CST -----  Contact: pt bobby Gordillo  Pt would like to be called back regarding rescheduling lab on 1/2/17.      Pt can be reached at [phone].

## 2018-01-01 ENCOUNTER — HOSPITAL ENCOUNTER (INPATIENT)
Facility: OTHER | Age: 78
LOS: 1 days | Discharge: HOME HEALTH | DRG: 202 | End: 2018-01-03
Attending: EMERGENCY MEDICINE | Admitting: HOSPITALIST
Payer: MEDICARE

## 2018-01-01 DIAGNOSIS — D47.3 ESSENTIAL THROMBOCYTHEMIA: ICD-10-CM

## 2018-01-01 DIAGNOSIS — J18.9 PNEUMONIA DUE TO INFECTIOUS ORGANISM, UNSPECIFIED LATERALITY, UNSPECIFIED PART OF LUNG: ICD-10-CM

## 2018-01-01 DIAGNOSIS — B34.9 ACUTE VIRAL SYNDROME: Primary | ICD-10-CM

## 2018-01-01 DIAGNOSIS — D72.828 OTHER ELEVATED WHITE BLOOD CELL (WBC) COUNT: ICD-10-CM

## 2018-01-01 DIAGNOSIS — R53.81 PHYSICAL DEBILITY: ICD-10-CM

## 2018-01-01 DIAGNOSIS — D47.1 MYELOPROLIFERATIVE NEOPLASM: ICD-10-CM

## 2018-01-01 LAB
ABO + RH BLD: NORMAL
ALBUMIN SERPL BCP-MCNC: 3.8 G/DL
ALP SERPL-CCNC: 60 U/L
ALT SERPL W/O P-5'-P-CCNC: 36 U/L
ANION GAP SERPL CALC-SCNC: 11 MMOL/L
ANISOCYTOSIS BLD QL SMEAR: SLIGHT
AST SERPL-CCNC: 52 U/L
BASOPHILS # BLD AUTO: ABNORMAL K/UL
BASOPHILS NFR BLD: 0 %
BILIRUB SERPL-MCNC: 0.6 MG/DL
BLD GP AB SCN CELLS X3 SERPL QL: NORMAL
BUN SERPL-MCNC: 16 MG/DL
CALCIUM SERPL-MCNC: 9.4 MG/DL
CHLORIDE SERPL-SCNC: 103 MMOL/L
CO2 SERPL-SCNC: 26 MMOL/L
CREAT SERPL-MCNC: 1.4 MG/DL
DACRYOCYTES BLD QL SMEAR: ABNORMAL
DIFFERENTIAL METHOD: ABNORMAL
DOHLE BOD BLD QL SMEAR: PRESENT
EOSINOPHIL # BLD AUTO: ABNORMAL K/UL
EOSINOPHIL NFR BLD: 1 %
ERYTHROCYTE [DISTWIDTH] IN BLOOD BY AUTOMATED COUNT: 18.5 %
EST. GFR  (AFRICAN AMERICAN): 42 ML/MIN/1.73 M^2
EST. GFR  (NON AFRICAN AMERICAN): 36 ML/MIN/1.73 M^2
GLUCOSE SERPL-MCNC: 80 MG/DL
HCT VFR BLD AUTO: 22.4 %
HGB BLD-MCNC: 7.3 G/DL
LACTATE SERPL-SCNC: 0.9 MMOL/L
LYMPHOCYTES # BLD AUTO: ABNORMAL K/UL
LYMPHOCYTES NFR BLD: 5 %
MCH RBC QN AUTO: 31.5 PG
MCHC RBC AUTO-ENTMCNC: 32.6 G/DL
MCV RBC AUTO: 97 FL
METAMYELOCYTES NFR BLD MANUAL: 1 %
MONOCYTES # BLD AUTO: ABNORMAL K/UL
MONOCYTES NFR BLD: 7 %
NEUTROPHILS NFR BLD: 61 %
NEUTS BAND NFR BLD MANUAL: 25 %
PLATELET # BLD AUTO: 153 K/UL
PLATELET BLD QL SMEAR: ABNORMAL
PMV BLD AUTO: 11.3 FL
POIKILOCYTOSIS BLD QL SMEAR: SLIGHT
POLYCHROMASIA BLD QL SMEAR: ABNORMAL
POTASSIUM SERPL-SCNC: 4.1 MMOL/L
PROT SERPL-MCNC: 8.5 G/DL
RBC # BLD AUTO: 2.32 M/UL
SCHISTOCYTES BLD QL SMEAR: ABNORMAL
SODIUM SERPL-SCNC: 140 MMOL/L
WBC # BLD AUTO: 31.6 K/UL

## 2018-01-01 PROCEDURE — 99285 EMERGENCY DEPT VISIT HI MDM: CPT | Mod: 25

## 2018-01-01 PROCEDURE — 96360 HYDRATION IV INFUSION INIT: CPT

## 2018-01-01 PROCEDURE — 86850 RBC ANTIBODY SCREEN: CPT

## 2018-01-01 PROCEDURE — 87040 BLOOD CULTURE FOR BACTERIA: CPT | Mod: 59

## 2018-01-01 PROCEDURE — 25000003 PHARM REV CODE 250: Performed by: EMERGENCY MEDICINE

## 2018-01-01 PROCEDURE — 85007 BL SMEAR W/DIFF WBC COUNT: CPT

## 2018-01-01 PROCEDURE — 80053 COMPREHEN METABOLIC PANEL: CPT

## 2018-01-01 PROCEDURE — 85027 COMPLETE CBC AUTOMATED: CPT

## 2018-01-01 PROCEDURE — 83605 ASSAY OF LACTIC ACID: CPT

## 2018-01-01 RX ORDER — SODIUM CHLORIDE 9 MG/ML
500 INJECTION, SOLUTION INTRAVENOUS
Status: COMPLETED | OUTPATIENT
Start: 2018-01-01 | End: 2018-01-02

## 2018-01-01 RX ADMIN — SODIUM CHLORIDE 500 ML: 0.9 INJECTION, SOLUTION INTRAVENOUS at 11:01

## 2018-01-02 PROBLEM — J18.9 PNEUMONIA DUE TO INFECTIOUS ORGANISM: Status: ACTIVE | Noted: 2018-01-02

## 2018-01-02 PROBLEM — D47.1 MYELOPROLIFERATIVE NEOPLASM: Status: ACTIVE | Noted: 2018-01-02

## 2018-01-02 PROBLEM — D72.829 LEUKOCYTOSIS: Status: ACTIVE | Noted: 2018-01-02

## 2018-01-02 LAB
ALBUMIN SERPL BCP-MCNC: 3.3 G/DL
ALP SERPL-CCNC: 54 U/L
ALT SERPL W/O P-5'-P-CCNC: 32 U/L
ANION GAP SERPL CALC-SCNC: 7 MMOL/L
ANISOCYTOSIS BLD QL SMEAR: SLIGHT
AST SERPL-CCNC: 47 U/L
BASOPHILS # BLD AUTO: ABNORMAL K/UL
BASOPHILS NFR BLD: 0 %
BILIRUB SERPL-MCNC: 0.6 MG/DL
BLD PROD TYP BPU: NORMAL
BLOOD UNIT EXPIRATION DATE: NORMAL
BLOOD UNIT TYPE CODE: 5100
BLOOD UNIT TYPE: NORMAL
BUN SERPL-MCNC: 15 MG/DL
CALCIUM SERPL-MCNC: 8.9 MG/DL
CHLORIDE SERPL-SCNC: 105 MMOL/L
CO2 SERPL-SCNC: 25 MMOL/L
CODING SYSTEM: NORMAL
CREAT SERPL-MCNC: 1.3 MG/DL
DIFFERENTIAL METHOD: ABNORMAL
DISPENSE STATUS: NORMAL
DOHLE BOD BLD QL SMEAR: PRESENT
EOSINOPHIL # BLD AUTO: ABNORMAL K/UL
EOSINOPHIL NFR BLD: 0 %
ERYTHROCYTE [DISTWIDTH] IN BLOOD BY AUTOMATED COUNT: 17.4 %
EST. GFR  (AFRICAN AMERICAN): 46 ML/MIN/1.73 M^2
EST. GFR  (NON AFRICAN AMERICAN): 40 ML/MIN/1.73 M^2
ESTIMATED AVG GLUCOSE: 108 MG/DL
GLUCOSE SERPL-MCNC: 95 MG/DL
HBA1C MFR BLD HPLC: 5.4 %
HCT VFR BLD AUTO: 23.3 %
HGB BLD-MCNC: 7.6 G/DL
LYMPHOCYTES # BLD AUTO: ABNORMAL K/UL
LYMPHOCYTES NFR BLD: 4 %
MAGNESIUM SERPL-MCNC: 2.1 MG/DL
MCH RBC QN AUTO: 31.1 PG
MCHC RBC AUTO-ENTMCNC: 32.6 G/DL
MCV RBC AUTO: 96 FL
METAMYELOCYTES NFR BLD MANUAL: 1 %
MONOCYTES # BLD AUTO: ABNORMAL K/UL
MONOCYTES NFR BLD: 6 %
NEUTROPHILS NFR BLD: 56 %
NEUTS BAND NFR BLD MANUAL: 33 %
NUM UNITS TRANS PACKED RBC: NORMAL
OVALOCYTES BLD QL SMEAR: ABNORMAL
PHOSPHATE SERPL-MCNC: 3.4 MG/DL
PLATELET # BLD AUTO: 120 K/UL
PLATELET BLD QL SMEAR: ABNORMAL
PMV BLD AUTO: 10.2 FL
POIKILOCYTOSIS BLD QL SMEAR: SLIGHT
POTASSIUM SERPL-SCNC: 4.1 MMOL/L
PROT SERPL-MCNC: 7.6 G/DL
RBC # BLD AUTO: 2.44 M/UL
SCHISTOCYTES BLD QL SMEAR: ABNORMAL
SODIUM SERPL-SCNC: 137 MMOL/L
WBC # BLD AUTO: 28.43 K/UL

## 2018-01-02 PROCEDURE — 85027 COMPLETE CBC AUTOMATED: CPT

## 2018-01-02 PROCEDURE — 80053 COMPREHEN METABOLIC PANEL: CPT

## 2018-01-02 PROCEDURE — 11000001 HC ACUTE MED/SURG PRIVATE ROOM

## 2018-01-02 PROCEDURE — P9038 RBC IRRADIATED: HCPCS

## 2018-01-02 PROCEDURE — 25000242 PHARM REV CODE 250 ALT 637 W/ HCPCS: Performed by: INTERNAL MEDICINE

## 2018-01-02 PROCEDURE — 86920 COMPATIBILITY TEST SPIN: CPT

## 2018-01-02 PROCEDURE — 84100 ASSAY OF PHOSPHORUS: CPT

## 2018-01-02 PROCEDURE — 83036 HEMOGLOBIN GLYCOSYLATED A1C: CPT

## 2018-01-02 PROCEDURE — 27201040 HC RC 50 FILTER

## 2018-01-02 PROCEDURE — 85007 BL SMEAR W/DIFF WBC COUNT: CPT

## 2018-01-02 PROCEDURE — 86644 CMV ANTIBODY: CPT

## 2018-01-02 PROCEDURE — 99222 1ST HOSP IP/OBS MODERATE 55: CPT | Mod: AI,,, | Performed by: HOSPITALIST

## 2018-01-02 PROCEDURE — 83735 ASSAY OF MAGNESIUM: CPT

## 2018-01-02 PROCEDURE — 94761 N-INVAS EAR/PLS OXIMETRY MLT: CPT

## 2018-01-02 PROCEDURE — 25000003 PHARM REV CODE 250: Performed by: INTERNAL MEDICINE

## 2018-01-02 PROCEDURE — 36430 TRANSFUSION BLD/BLD COMPNT: CPT

## 2018-01-02 PROCEDURE — 99900035 HC TECH TIME PER 15 MIN (STAT)

## 2018-01-02 PROCEDURE — 94640 AIRWAY INHALATION TREATMENT: CPT

## 2018-01-02 PROCEDURE — 36415 COLL VENOUS BLD VENIPUNCTURE: CPT

## 2018-01-02 PROCEDURE — 63600175 PHARM REV CODE 636 W HCPCS: Performed by: INTERNAL MEDICINE

## 2018-01-02 PROCEDURE — 99223 1ST HOSP IP/OBS HIGH 75: CPT | Mod: ,,, | Performed by: INTERNAL MEDICINE

## 2018-01-02 RX ORDER — OXYBUTYNIN CHLORIDE 5 MG/1
5 TABLET ORAL 2 TIMES DAILY
Status: DISCONTINUED | OUTPATIENT
Start: 2018-01-02 | End: 2018-01-03 | Stop reason: HOSPADM

## 2018-01-02 RX ORDER — IPRATROPIUM BROMIDE AND ALBUTEROL SULFATE 2.5; .5 MG/3ML; MG/3ML
3 SOLUTION RESPIRATORY (INHALATION) EVERY 6 HOURS
Status: DISCONTINUED | OUTPATIENT
Start: 2018-01-02 | End: 2018-01-03 | Stop reason: HOSPADM

## 2018-01-02 RX ORDER — PRAVASTATIN SODIUM 20 MG/1
20 TABLET ORAL DAILY
Status: DISCONTINUED | OUTPATIENT
Start: 2018-01-02 | End: 2018-01-03 | Stop reason: HOSPADM

## 2018-01-02 RX ORDER — BENZONATATE 100 MG/1
100 CAPSULE ORAL 3 TIMES DAILY PRN
Status: DISCONTINUED | OUTPATIENT
Start: 2018-01-02 | End: 2018-01-03 | Stop reason: HOSPADM

## 2018-01-02 RX ORDER — SODIUM CHLORIDE 0.9 % (FLUSH) 0.9 %
5 SYRINGE (ML) INJECTION
Status: DISCONTINUED | OUTPATIENT
Start: 2018-01-02 | End: 2018-01-03 | Stop reason: HOSPADM

## 2018-01-02 RX ORDER — LATANOPROST 50 UG/ML
1 SOLUTION/ DROPS OPHTHALMIC DAILY
Status: DISCONTINUED | OUTPATIENT
Start: 2018-01-02 | End: 2018-01-03 | Stop reason: HOSPADM

## 2018-01-02 RX ORDER — HYDROCODONE BITARTRATE AND ACETAMINOPHEN 500; 5 MG/1; MG/1
TABLET ORAL
Status: DISCONTINUED | OUTPATIENT
Start: 2018-01-02 | End: 2018-01-03 | Stop reason: HOSPADM

## 2018-01-02 RX ADMIN — IPRATROPIUM BROMIDE AND ALBUTEROL SULFATE 3 ML: .5; 3 SOLUTION RESPIRATORY (INHALATION) at 02:01

## 2018-01-02 RX ADMIN — PRAVASTATIN SODIUM 20 MG: 20 TABLET ORAL at 08:01

## 2018-01-02 RX ADMIN — IPRATROPIUM BROMIDE AND ALBUTEROL SULFATE 3 ML: .5; 3 SOLUTION RESPIRATORY (INHALATION) at 07:01

## 2018-01-02 RX ADMIN — BENZONATATE 100 MG: 100 CAPSULE ORAL at 09:01

## 2018-01-02 RX ADMIN — OXYBUTYNIN CHLORIDE 5 MG: 5 TABLET ORAL at 08:01

## 2018-01-02 RX ADMIN — BENZONATATE 100 MG: 100 CAPSULE ORAL at 03:01

## 2018-01-02 RX ADMIN — AZITHROMYCIN MONOHYDRATE 250 MG: 500 INJECTION, POWDER, LYOPHILIZED, FOR SOLUTION INTRAVENOUS at 03:01

## 2018-01-02 RX ADMIN — OXYBUTYNIN CHLORIDE 5 MG: 5 TABLET ORAL at 09:01

## 2018-01-02 RX ADMIN — CEFTRIAXONE SODIUM 1 G: 1 INJECTION, POWDER, FOR SOLUTION INTRAMUSCULAR; INTRAVENOUS at 02:01

## 2018-01-02 RX ADMIN — IPRATROPIUM BROMIDE AND ALBUTEROL SULFATE 3 ML: .5; 3 SOLUTION RESPIRATORY (INHALATION) at 08:01

## 2018-01-02 RX ADMIN — BENZONATATE 100 MG: 100 CAPSULE ORAL at 02:01

## 2018-01-02 NOTE — SUBJECTIVE & OBJECTIVE
Oncology Treatment Plan:   On Jakafi 10 mg bid    Medications:  Continuous Infusions:  Scheduled Meds:   albuterol-ipratropium 2.5mg-0.5mg/3mL  3 mL Nebulization Q6H    azithromycin  250 mg Intravenous Q24H    cefTRIAXone (ROCEPHIN) IVPB  1 g Intravenous Q24H    latanoprost  1 drop Both Eyes Daily    oxybutynin  5 mg Oral BID    pravastatin  20 mg Oral Daily    ruxolitinib  10 mg Oral BID     PRN Meds:sodium chloride, benzonatate, sodium chloride 0.9%, sodium chloride 0.9%     Review of patient's allergies indicates:  No Known Allergies     Past Medical History:   Diagnosis Date    Anemia     Cataract     Encounter for blood transfusion     Hypertension     Sickle cell trait      Past Surgical History:   Procedure Laterality Date     SECTION      HYSTERECTOMY       Family History     Problem Relation (Age of Onset)    Prostate cancer Brother    Sickle cell anemia Father    Thyroid disease Mother        Social History Main Topics    Smoking status: Never Smoker    Smokeless tobacco: Never Used    Alcohol use No    Drug use: No    Sexual activity: Not on file       Review of Systems   Constitutional: Positive for fever (temp of 99-100F at home). Negative for appetite change, chills, fatigue and unexpected weight change.   HENT: Negative for mouth sores, nosebleeds, tinnitus and trouble swallowing.    Eyes: Negative for pain, redness and visual disturbance.   Respiratory: Positive for cough. Negative for chest tightness, shortness of breath and wheezing.    Cardiovascular: Negative for chest pain.   Gastrointestinal: Negative for abdominal pain, blood in stool, constipation, diarrhea, nausea and vomiting.   Endocrine: Negative for polydipsia, polyphagia and polyuria.   Genitourinary: Negative for dysuria, frequency and hematuria.   Musculoskeletal: Negative for arthralgias, back pain, joint swelling, myalgias, neck pain and neck stiffness.   Skin: Negative for rash.   Neurological: Negative  for dizziness, tremors, seizures, syncope, speech difficulty, weakness, numbness and headaches.   Hematological: Negative for adenopathy. Does not bruise/bleed easily.   Psychiatric/Behavioral: Negative for confusion and dysphoric mood. The patient is not nervous/anxious.      Objective:     Vital Signs (Most Recent):  Temp: 100 °F (37.8 °C) (01/02/18 1528)  Pulse: 100 (01/02/18 1528)  Resp: 20 (01/02/18 1528)  BP: 139/68 (01/02/18 1528)  SpO2: 98 % (01/02/18 1528) Vital Signs (24h Range):  Temp:  [98.2 °F (36.8 °C)-100 °F (37.8 °C)] 100 °F (37.8 °C)  Pulse:  [] 100  Resp:  [16-20] 20  SpO2:  [94 %-100 %] 98 %  BP: (111-139)/(54-74) 139/68     Weight: 45.7 kg (100 lb 12 oz)  Body mass index is 19.04 kg/m².  Body surface area is 1.4 meters squared.      Intake/Output Summary (Last 24 hours) at 01/02/18 1637  Last data filed at 01/02/18 0600   Gross per 24 hour   Intake            542.5 ml   Output              350 ml   Net            192.5 ml       Physical Exam   Constitutional: She is oriented to person, place, and time. She appears well-developed and well-nourished. She is cooperative. No distress.   HENT:   Head: Normocephalic and atraumatic.   Eyes: Conjunctivae and EOM are normal. Pupils are equal, round, and reactive to light. No scleral icterus.   Neck: Normal range of motion. Neck supple.   Cardiovascular: Normal rate and regular rhythm.  Exam reveals no gallop and no friction rub.    No murmur heard.  Pulmonary/Chest: Breath sounds normal. She has no wheezes. She has no rales.   Abdominal: Soft. Bowel sounds are normal. She exhibits no distension and no mass. There is no tenderness.   Musculoskeletal: Normal range of motion. She exhibits no edema.   Lymphadenopathy:     She has no cervical adenopathy.   Neurological: She is alert and oriented to person, place, and time. She has normal strength. No cranial nerve deficit.   Skin: Skin is warm and dry. No rash noted. No erythema.   Psychiatric: She has a  normal mood and affect. Her behavior is normal. Judgment and thought content normal.   Vitals reviewed.      Significant Labs:   BMP:   Recent Labs  Lab 01/01/18  2242 01/02/18  0421   GLU 80 95    137   K 4.1 4.1    105   CO2 26 25   BUN 16 15   CREATININE 1.4 1.3   CALCIUM 9.4 8.9   MG  --  2.1    and CBC:   Recent Labs  Lab 01/01/18 2242 01/02/18  0421   WBC 31.60* 28.43*   HGB 7.3* 7.6*   HCT 22.4* 23.3*    120*       Diagnostic Results:  I have reviewed all pertinent imaging results/findings within the past 24 hours.

## 2018-01-02 NOTE — PLAN OF CARE
SW met with pt at bedside to complete discharge, daughter, Maricruz present.  Pt lives with spouse, daughters Maricruz and Zully and Zully's .  Pt needs assistance with dressing and bathing and has staff M-F to help with bathing.  Pt's daughter requested hospital bed, RW, BSC and HH.     01/02/18 1117   Discharge Assessment   Assessment Type Discharge Planning Assessment   Confirmed/corrected address and phone number on facesheet? Yes   Assessment information obtained from? Patient   Communicated expected length of stay with patient/caregiver no   Prior to hospitilization cognitive status: Alert/Oriented   Prior to hospitalization functional status: Needs Assistance   Current cognitive status: Alert/Oriented   Current Functional Status: Needs Assistance   Facility Arrived From: ([facility])   Lives With spouse;child(stephen), adult   Able to Return to Prior Arrangements yes   Is patient able to care for self after discharge? Unable to determine at this time (comments)   Who are your caregiver(s) and their phone number(s)? (Maricruz, daughter, [phone])   Patient's perception of discharge disposition home health   Readmission Within The Last 30 Days no previous admission in last 30 days   Patient currently being followed by outpatient case management? No   Patient currently receives any other outside agency services? No   Equipment Currently Used at Home none   Do you have any problems affording any of your prescribed medications? No   Is the patient taking medications as prescribed? yes   Does the patient have transportation home? Yes   Transportation Available family or friend will provide   Does the patient receive services at the [facility]mad[facility] Clinic? No   Discharge Plan A Home Health   Patient/Family In Agreement With Plan yes

## 2018-01-02 NOTE — ED PROVIDER NOTES
Encounter Date: 2018    SCRIBE #1 NOTE: I, Seb Gutiérrez, am scribing for, and in the presence of,  Dr. Henriquez I have scribed the entire note.       History     Chief Complaint   Patient presents with    Pneumonia     Sent from [facility] ER with diagnosis of pneumonia.  VS, per EMS, stable in route. Reports, dry, nonproductive cough     Time patient was seen by the provider: 9:46 PM      The patient is a 77 y.o. female with hx of: HTN, Sickle Cell Trait, anemia who presents to the ED via EMS from [facility] after being transferred from [facility].  The patient reports shortness of breath with persistent productive cough. She has had associated fevers as high as 101 per her daughter. She was seen by her family doctor 3 days ago and diagnosed with bronchitis, given antibiotics but she has not improved since that time.  Her daughter brought her to the local ED and was found to have PNA and then transferred here for further care.  Her daughter reports the patient has an oncologic issue and has regular blood transfusions at the [facility].  She is due for another blood transfusion tomorrow.  The patient denies any chronic respiratory issues.              Review of patient's allergies indicates:  No Known Allergies  Past Medical History:   Diagnosis Date    Anemia     Cataract     Encounter for blood transfusion     Hypertension     Sickle cell trait      Past Surgical History:   Procedure Laterality Date     SECTION      HYSTERECTOMY       Family History   Problem Relation Age of Onset    Thyroid disease Mother     Sickle cell anemia Father     Prostate cancer Brother      Social History   Substance Use Topics    Smoking status: Never Smoker    Smokeless tobacco: Never Used    Alcohol use No     Review of Systems   Constitutional: Positive for fever.   HENT: Negative for sore throat.    Respiratory: Positive for cough and shortness of breath.    Cardiovascular: Negative for  chest pain.   Gastrointestinal: Negative for nausea.   Genitourinary: Negative for difficulty urinating and dysuria.   Musculoskeletal: Negative for back pain.   Skin: Negative for rash.   Neurological: Negative for dizziness, weakness and headaches.   Hematological: Does not bruise/bleed easily.        Known anemia       Physical Exam     Initial Vitals   BP Pulse Resp Temp SpO2   -- -- -- -- --      MAP       --         Vitals:    01/02/18 0315   BP: (!) 121/59   Pulse: 107   Resp: 20   Temp: 98.8 °F (37.1 °C)           Physical Exam    Nursing note and vitals reviewed.  Constitutional: She appears well-developed.   HENT:   Head: Normocephalic and atraumatic.   Mouth/Throat: Oropharynx is clear and moist.   Eyes: Conjunctivae and EOM are normal.   Neck: Normal range of motion. Neck supple.   Cardiovascular: Regular rhythm, normal heart sounds and intact distal pulses.   Tachycardic   Pulmonary/Chest: No respiratory distress.   Coarse breath sounds with rhonchi at the bases bilaterally,   Abdominal: Soft. Bowel sounds are normal. She exhibits no distension. There is no tenderness.   Musculoskeletal: Normal range of motion.   Neurological: She is alert and oriented to person, place, and time.   Skin: Skin is warm and dry. No rash noted. No erythema.   Psychiatric: She has a normal mood and affect.         ED Course   Procedures  Labs Reviewed   COMPREHENSIVE METABOLIC PANEL - Abnormal; Notable for the following:        Result Value    Total Protein 8.5 (*)     AST 52 (*)     eGFR if  42 (*)     eGFR if non  36 (*)     All other components within normal limits   CBC W/ AUTO DIFFERENTIAL - Abnormal; Notable for the following:     WBC 31.60 (*)     RBC 2.32 (*)     Hemoglobin 7.3 (*)     Hematocrit 22.4 (*)     MCH 31.5 (*)     RDW 18.5 (*)     Lymph% 5.0 (*)     All other components within normal limits   LACTIC ACID, PLASMA   TYPE & SCREEN          X-Rays:   Independently Interpreted  Readings:   Chest X-Ray: Trachea midline.  Slightly enlarged heart.  No clear infiltrate, effusion or edema.       Medical Decision Making:   History:   Old Medical Records: I decided to obtain old medical records.  Old Records Summarized: records from another hospital, other records and records from clinic visits.  Initial Assessment:   9:46PM:  Pt is a 76 y/o F who presents to ED with SOB, cough, congestion, diagnosed with PNA and tx for further management.  Pt is currently on outpatient abx and failing treatment at this time.  She is in no respiratory distress but is tachycardic with coarse breath sounds.  She is also due for a transfusion tomorrow. Will plan for labs, CXR, will continue to follow and reassess.    Independently Interpreted Test(s):   I have ordered and independently interpreted X-rays - see prior notes.  Clinical Tests:   Lab Tests: Ordered and Reviewed  Radiological Study: Ordered and Reviewed    11:16PM:  Was informed that pt was supposed to be transferred from OSH directly to the floor.  Will plan to admit patient for further observation and management.  I updated pt who is aware of plan and all questions answered.                     ED Course      Clinical Impression:     1. Pneumonia due to infectious organism, unspecified laterality, unspecified part of lung        Disposition:   Disposition: Admitted  Condition: Stable                        Miranda Manjarrez MD  01/02/18 0425       Miranda Manjarrez MD  01/02/18 0427

## 2018-01-02 NOTE — ED TRIAGE NOTES
Per daughter, pt started with cold symptoms about 1 week ago - pt began having a productive cough and went to hospital and was dx with pneumonia - pt is treated through [facility] for anemia so pt transferred to [facility] for further workup

## 2018-01-02 NOTE — ASSESSMENT & PLAN NOTE
- JAK2 positive MPN on Jakafi 10 mg  - counts stable. Smear reviewed. Occasional metamyelocytes. No blasts.   - Can hold off on Jakafi given acute infection. Jakafi can be associated with rare opportunistic infections. Her current infection looks more consistent with viral bronchitis.   - Follow up with Dr. Parmar next Monday and resume Jakafi after infection is resolved.

## 2018-01-02 NOTE — H&P
[facility] Medicine History and Physical      Admitting Physician: Marvin Bautista MD  Attending Physician: Hospital Medicine Staff  PCP: Florencio Pacheco    Date of Admit: 2018    Chief Complaint     Pneumonia (Sent from [facility] ER with diagnosis of pneumonia.  VS, per EMS, stable in route. Reports, dry, nonproductive cough)       Subjective:      History of Present Illness:  Courtney Hammer is a 77 y.o. AA female who  has a past medical history of Anemia; Cataract; Encounter for blood transfusion; Hypertension; and Sickle cell trait.. The patient presented to [facility] on 2018 with a primary complaint of Pneumonia (Sent from [facility] ER with diagnosis of pneumonia.  VS, per EMS, stable in route. Reports, dry, nonproductive cough)      The patient was in their usual state of health until 4 days ago when she began to have a cough and increased sob.  Pt endorses cough since that time that has gotten worse. She saw her pcp and was stated on abx for bronchitis with no improvement.  She presented to the ED for treatment.  She was noted to be anemic and they decided to transfer to [facility] for transfusion and consultation to the leukemia service.  The transfer center diverted the patient to [facility].      Past Medical History:  Past Medical History:   Diagnosis Date    Anemia     Cataract     Encounter for blood transfusion     Hypertension     Sickle cell trait        Past Surgical History:  Past Surgical History:   Procedure Laterality Date     SECTION      HYSTERECTOMY         Allergies:  Review of patient's allergies indicates:  No Known Allergies    Home Medications:  Prior to Admission medications    Medication Sig Start Date End Date Taking? Authorizing Provider   benzonatate (TESSALON) 200 MG capsule TK ONE C PO  Q EIGHT H PRN COUGH 16   Historical Provider, MD   calcium carbonate-vitamin D3 (CALCIUM 600 + D,3,) 600 mg calcium-  200 unit Cap Take by mouth once daily.    Historical Provider, MD   EPIGALLOCATECHIN GALLATE (GREEN TEA EXTRACT MISC) by Misc.(Non-Drug; Combo Route) route.    Historical Provider, MD   fluticasone (FLONASE) 50 mcg/actuation nasal spray 1 spray by Each Nare route once daily.    Historical Provider, MD   FLUZONE HIGH-DOSE 2017-18, PF, 180 mcg/0.5 mL vaccine  10/2/17   Historical Provider, MD   FOLIC ACID-B WYTA-Y-ACEPD-ZINC 3-70-15 MG-MCG-MG ORAL TAB Take by mouth.    Historical Provider, MD   JAKAFI 20 mg Tab  9/27/17   Historical Provider, MD   latanoprost 0.005 % ophthalmic solution  1/8/16   Historical Provider, MD   megestrol (MEGACE) 400 mg/10 mL (40 mg/mL) Susp  1/4/16   Historical Provider, MD   meloxicam (MOBIC) 15 MG tablet Take 15 mg by mouth once daily.    Historical Provider, MD   NIACIN ORAL Take by mouth 2 (two) times daily.    Historical Provider, MD   oxybutynin (DITROPAN) 5 MG Tab TK 1 T PO  BID. 8/15/16   Historical Provider, MD   pravastatin (PRAVACHOL) 20 MG tablet Take 20 mg by mouth once daily.    Historical Provider, MD   PREVNAR 13, PF, 0.5 mL Syrg  10/2/17   Historical Provider, MD   promethazine (PHENERGAN) 6.25 mg/5 mL syrup TK 10 ML PO Q 8 HOURS PRF COUGH 8/27/17   Historical Provider, MD   promethazine-codeine 6.25-10 mg/5 ml (PHENERGAN WITH CODEINE) 6.25-10 mg/5 mL syrup TK 1 TEA Q 4 TO 6 H PRF COUGH 7/5/16   Historical Provider, MD   ruxolitinib (JAKAFI) 10 mg Tab Take 10 mg by mouth 2 (two) times daily. 10/25/17   Rico Daniels MD   TRADJENTA 5 mg Tab tablet TK 1 T PO QD 9/16/17   Historical Provider, MD   valsartan-hydrochlorothiazide (DIOVAN-HCT) 80-12.5 mg per tablet Take 1 tablet by mouth once daily. 1/2 tablet every morning    Historical Provider, MD       Family History:  Family History   Problem Relation Age of Onset    Thyroid disease Mother     Sickle cell anemia Father     Prostate cancer Brother        Social History:  Social History   Substance Use Topics     "Smoking status: Never Smoker    Smokeless tobacco: Never Used    Alcohol use No       Review of Systems:  Pertinent items are noted in HPI.   Constitutional: +fever or chills, no weight changes.  Eyes: No visual changes or photophobia  HEENT: No nasal congestion or sore throat  Respiratory: No cough or shorness of breath  Cardiovascular: No chest pain or palpitations  Gastrointestinal: No nausea or vomiting, no diarrhea or change in bowel habits  Genitourinary: No hematuria or dysuria  Musculoskeletal: No myalgias or weakness   Skin: No rash or pruritis  Hematologic/lymphatic: No easy bruising, bleeding or lymphadenopathy  Neurologic: no seizures or tremors  Behavioral/Psych: no auditory or visual hallucinations  Endocrine: no heat or cold intolerance  All other systems are reviewed and are negative.       Objective:   Last 24 Hour Vital Signs:  BP  Min: 125/68  Max: 137/64  Temp  Av.2 °F (37.3 °C)  Min: 98.9 °F (37.2 °C)  Max: 99.4 °F (37.4 °C)  Pulse  Av.7  Min: 109  Max: 121  Resp  Av.3  Min: 18  Max: 20  SpO2  Av %  Min: 94 %  Max: 98 %  Height  Av' 1" (154.9 cm)  Min: 5' 1" (154.9 cm)  Max: 5' 1" (154.9 cm)  Weight  Av.4 kg (100 lb 1.4 oz)  Min: 45.4 kg (100 lb 1.4 oz)  Max: 45.4 kg (100 lb 1.4 oz)  Body mass index is 18.91 kg/m².  No intake/output data recorded.    Physical Examination:  GEN: AAOx3, nad  Head: atraumatic, normocephalic  EEN: eomi, perrl, op clear, mmm  T: neck supple, JVP ~8   Lungs: CTA b, no wheezes, or rhonchi, no accessory muscle use  Heart: rrr, no mrg  Abd: s, ntnd +bs  Ext: no c/c/e, capillary refill ~2 sec  Lymph: no cervical, axillary, inguinal LAD  Skin: no rashes or lesions  Neuro: no focal deficits, 5/5 upper and lower strength   Psych: normal mood and affect    Laboratory:  Most Recent Data:  CBC: Lab Results   Component Value Date    WBC 31.60 (H) 2018    HGB 7.3 (L) 2018    HCT 22.4 (L) 2018     2018    MCV 97 " 01/01/2018    RDW 18.5 (H) 01/01/2018     BMP: Lab Results   Component Value Date     01/01/2018    K 4.1 01/01/2018     01/01/2018    CO2 26 01/01/2018    BUN 16 01/01/2018    CREATININE 1.4 01/01/2018    GLU 80 01/01/2018    CALCIUM 9.4 01/01/2018     LFTs: Lab Results   Component Value Date    PROT 8.5 (H) 01/01/2018    ALBUMIN 3.8 01/01/2018    BILITOT 0.6 01/01/2018    AST 52 (H) 01/01/2018    ALKPHOS 60 01/01/2018    ALT 36 01/01/2018     Coags: No results found for: INR, PROTIME, PTT  FLP: No results found for: CHOL, HDL, LDLCALC, TRIG, CHOLHDL  DM: Lab Results   Component Value Date    CREATININE 1.4 01/01/2018     Thyroid: No results found for: TSH, FREET4, S0VFEPE, D9BEYNW, THYROIDAB  Anemia: No results found for: IRON, TIBC, FERRITIN, LVRTGJIF04, FOLATE  Cardiac: No results found for: TROPONINI, CKTOTAL, CKMB, BNP  Urinalysis: No results found for: LABURIN, COLORU, CLARITYU, SPECGRAV, LABSPEC, NITRITE, PROTEINUR, GLUCOSEU, KETONESU, UROBILINOGEN, BILIRUBINUR, BLOODU, RBCU, WBCUA    Trended Lab Data:    Recent Labs  Lab 01/01/18  2242   WBC 31.60*   HGB 7.3*   HCT 22.4*      MCV 97   RDW 18.5*      K 4.1      CO2 26   BUN 16   CREATININE 1.4   GLU 80   PROT 8.5*   ALBUMIN 3.8   BILITOT 0.6   AST 52*   ALKPHOS 60   ALT 36       Trended Cardiac Data:  No results for input(s): TROPONINI, CKTOTAL, CKMB, BNP in the last 168 hours.      Radiology:  Imaging Results          X-Ray Chest 1 View (Final result)  Result time 01/01/18 23:18:55    Final result by Logan Castellanos MD (01/01/18 23:18:55)                 Impression:       1. No airspace opacities suggest lobar pneumonia.     2. Cardiomegaly without secondary findings of CHF.                Electronically signed by: LOGAN CASTELLANOS MD  Date:     01/01/18  Time:    23:18              Narrative:    Exam: [id number]  01/01/18  22:14:00 IMG34 ([facility]) : XR CHEST 1 VIEW    Technique:    Single frontal chest x-ray    Comparison:     None      Findings:      There is a vascular access catheter overlying the left lateral hemithorax.  This may represent abundant  catheter.      The trachea is unremarkable.  There are calcifications of the aortic knob.  There is enlargement of the cardiomediastinal silhouette.  The hemidiaphragms are within normal limits.  There is no evidence of free air beneath the hemidiaphragms.  There are no pleural effusions.  There is no evidence of a pneumothorax.  There is no evidence of pneumomediastinum.  No airspace opacities are present.  There are degenerative changes in the osseous structures.                               Assessment:     Courtney Hammer is a 77 y.o. female with:  Patient Active Problem List    Diagnosis Date Noted    Pneumonia due to infectious organism 01/02/2018    Pneumonia 01/01/2018    Essential thrombocythemia 07/02/2015    Anemia 07/02/2015        Plan:     Right sided PNA  -repeat chest xray here not impressive.  Will continue abx with azithromycin/ceftriaxone.  Flu negative.  Supportive care.     Myeloproliferative disorder  -hold home ruxolitinib for now    Anemia 2/2 antineoplastic therapy  -1 unit leukoreduced/irradiated/cmv neg blood    CKD3  -renal function at baseline    HTN  -stable, resume home meds as tolerated     Code Status:     Full    Marvin Bautista MD  [facility] Medicine  SpectraLink: [id number]

## 2018-01-02 NOTE — HPI
76 yo woman with JAK2 positive MPN on Jakafi 10 mg bid who was transferred for pneumonia. Patient presented with cough and temp of 100F.  had similar symptoms. CXR after transfer did not reveal consolidation or opacification in the lungs. Counts stable. She feels fine on interview, continue to have dry cough but denies other complaints.

## 2018-01-02 NOTE — PLAN OF CARE
Problem: Patient Care Overview  Goal: Plan of Care Review  Outcome: Ongoing (interventions implemented as appropriate)  New admit started on aerosol. No acute distress noted.

## 2018-01-02 NOTE — CONSULTS
[facility]  Hematology/Oncology  Consult Note    Patient Name: Courtney Hammer  MRN: [id number]  Admission Date: 2018  Hospital Length of Stay: 0 days  Code Status: Full Code   Attending Provider: Manuel Coon MD  Consulting Provider: Pauly Garvey MD  Primary Care Physician: Florencio Pacheco MD  Principal Problem:<principal problem not specified>    Inpatient consult to Hematology/Oncology  Consult performed by: PAULY GARVEY  Consult ordered by: MANUEL COON  Reason for consult: JAK2 positive MPN  Assessment/Recommendations: See consult note        Subjective:     HPI:  76 yo woman with JAK2 positive MPN on Jakafi 10 mg bid who was transferred for pneumonia. Patient presented with cough and temp of 100F.  had similar symptoms. CXR after transfer did not reveal consolidation or opacification in the lungs. Counts stable. She feels fine on interview, continue to have dry cough but denies other complaints.     Oncology Treatment Plan:   On Jakafi 10 mg bid    Medications:  Continuous Infusions:  Scheduled Meds:   albuterol-ipratropium 2.5mg-0.5mg/3mL  3 mL Nebulization Q6H    azithromycin  250 mg Intravenous Q24H    cefTRIAXone (ROCEPHIN) IVPB  1 g Intravenous Q24H    latanoprost  1 drop Both Eyes Daily    oxybutynin  5 mg Oral BID    pravastatin  20 mg Oral Daily    ruxolitinib  10 mg Oral BID     PRN Meds:sodium chloride, benzonatate, sodium chloride 0.9%, sodium chloride 0.9%     Review of patient's allergies indicates:  No Known Allergies     Past Medical History:   Diagnosis Date    Anemia     Cataract     Encounter for blood transfusion     Hypertension     Sickle cell trait      Past Surgical History:   Procedure Laterality Date     SECTION      HYSTERECTOMY       Family History     Problem Relation (Age of Onset)    Prostate cancer Brother    Sickle cell anemia Father    Thyroid disease Mother        Social History Main Topics    Smoking status: Never Smoker     Smokeless tobacco: Never Used    Alcohol use No    Drug use: No    Sexual activity: Not on file       Review of Systems   Constitutional: Positive for fever (temp of 99-100F at home). Negative for appetite change, chills, fatigue and unexpected weight change.   HENT: Negative for mouth sores, nosebleeds, tinnitus and trouble swallowing.    Eyes: Negative for pain, redness and visual disturbance.   Respiratory: Positive for cough. Negative for chest tightness, shortness of breath and wheezing.    Cardiovascular: Negative for chest pain.   Gastrointestinal: Negative for abdominal pain, blood in stool, constipation, diarrhea, nausea and vomiting.   Endocrine: Negative for polydipsia, polyphagia and polyuria.   Genitourinary: Negative for dysuria, frequency and hematuria.   Musculoskeletal: Negative for arthralgias, back pain, joint swelling, myalgias, neck pain and neck stiffness.   Skin: Negative for rash.   Neurological: Negative for dizziness, tremors, seizures, syncope, speech difficulty, weakness, numbness and headaches.   Hematological: Negative for adenopathy. Does not bruise/bleed easily.   Psychiatric/Behavioral: Negative for confusion and dysphoric mood. The patient is not nervous/anxious.      Objective:     Vital Signs (Most Recent):  Temp: 100 °F (37.8 °C) (01/02/18 1528)  Pulse: 100 (01/02/18 1528)  Resp: 20 (01/02/18 1528)  BP: 139/68 (01/02/18 1528)  SpO2: 98 % (01/02/18 1528) Vital Signs (24h Range):  Temp:  [98.2 °F (36.8 °C)-100 °F (37.8 °C)] 100 °F (37.8 °C)  Pulse:  [] 100  Resp:  [16-20] 20  SpO2:  [94 %-100 %] 98 %  BP: (111-139)/(54-74) 139/68     Weight: 45.7 kg (100 lb 12 oz)  Body mass index is 19.04 kg/m².  Body surface area is 1.4 meters squared.      Intake/Output Summary (Last 24 hours) at 01/02/18 1637  Last data filed at 01/02/18 0600   Gross per 24 hour   Intake            542.5 ml   Output              350 ml   Net            192.5 ml       Physical Exam   Constitutional:  She is oriented to person, place, and time. She appears well-developed and well-nourished. She is cooperative. No distress.   HENT:   Head: Normocephalic and atraumatic.   Eyes: Conjunctivae and EOM are normal. Pupils are equal, round, and reactive to light. No scleral icterus.   Neck: Normal range of motion. Neck supple.   Cardiovascular: Normal rate and regular rhythm.  Exam reveals no gallop and no friction rub.    No murmur heard.  Pulmonary/Chest: Breath sounds normal. She has no wheezes. She has no rales.   Abdominal: Soft. Bowel sounds are normal. She exhibits no distension and no mass. There is no tenderness.   Musculoskeletal: Normal range of motion. She exhibits no edema.   Lymphadenopathy:     She has no cervical adenopathy.   Neurological: She is alert and oriented to person, place, and time. She has normal strength. No cranial nerve deficit.   Skin: Skin is warm and dry. No rash noted. No erythema.   Psychiatric: She has a normal mood and affect. Her behavior is normal. Judgment and thought content normal.   Vitals reviewed.      Significant Labs:   BMP:   Recent Labs  Lab 01/01/18 2242 01/02/18  0421   GLU 80 95    137   K 4.1 4.1    105   CO2 26 25   BUN 16 15   CREATININE 1.4 1.3   CALCIUM 9.4 8.9   MG  --  2.1    and CBC:   Recent Labs  Lab 01/01/18 2242 01/02/18  0421   WBC 31.60* 28.43*   HGB 7.3* 7.6*   HCT 22.4* 23.3*    120*       Diagnostic Results:  I have reviewed all pertinent imaging results/findings within the past 24 hours.    Assessment/Plan:     Myeloproliferative neoplasm    - JAK2 positive MPN on Jakafi 10 mg  - counts stable. Smear reviewed. Occasional metamyelocytes. No blasts.   - Can hold off on Jakafi given acute infection. Jakafi can be associated with rare opportunistic infections. Her current infection looks more consistent with viral bronchitis.   - Follow up with Dr. Parmar next Monday and resume Jakafi after infection is resolved.         Leukocytosis     - from JAK2 positive MPN  - WBC stable. No blasts on peripheral smear.         Anemia    - stable, transfuse for Hb<7            Thank you for your consult. I will sign off. Please contact us if you have any additional questions.    Pauly Perry MD  Hematology/Oncology  [facility]

## 2018-01-03 VITALS
DIASTOLIC BLOOD PRESSURE: 61 MMHG | HEIGHT: 61 IN | BODY MASS INDEX: 18.94 KG/M2 | RESPIRATION RATE: 18 BRPM | HEART RATE: 87 BPM | TEMPERATURE: 99 F | SYSTOLIC BLOOD PRESSURE: 121 MMHG | OXYGEN SATURATION: 96 % | WEIGHT: 100.31 LBS

## 2018-01-03 PROBLEM — R53.81 PHYSICAL DEBILITY: Status: ACTIVE | Noted: 2018-01-03

## 2018-01-03 PROBLEM — B34.9 ACUTE VIRAL SYNDROME: Status: ACTIVE | Noted: 2018-01-03

## 2018-01-03 LAB
ALBUMIN SERPL BCP-MCNC: 3.3 G/DL
ALP SERPL-CCNC: 61 U/L
ALT SERPL W/O P-5'-P-CCNC: 34 U/L
ANION GAP SERPL CALC-SCNC: 8 MMOL/L
ANISOCYTOSIS BLD QL SMEAR: SLIGHT
AST SERPL-CCNC: 48 U/L
BASOPHILS NFR BLD: 0 %
BILIRUB SERPL-MCNC: 0.5 MG/DL
BUN SERPL-MCNC: 12 MG/DL
CALCIUM SERPL-MCNC: 9 MG/DL
CHLORIDE SERPL-SCNC: 106 MMOL/L
CO2 SERPL-SCNC: 25 MMOL/L
CREAT SERPL-MCNC: 1.2 MG/DL
DACRYOCYTES BLD QL SMEAR: ABNORMAL
DIFFERENTIAL METHOD: ABNORMAL
DOHLE BOD BLD QL SMEAR: PRESENT
EOSINOPHIL NFR BLD: 0 %
ERYTHROCYTE [DISTWIDTH] IN BLOOD BY AUTOMATED COUNT: 17.3 %
EST. GFR  (AFRICAN AMERICAN): 50 ML/MIN/1.73 M^2
EST. GFR  (NON AFRICAN AMERICAN): 44 ML/MIN/1.73 M^2
GLUCOSE SERPL-MCNC: 89 MG/DL
HCT VFR BLD AUTO: 25.2 %
HGB BLD-MCNC: 8.4 G/DL
HYPOCHROMIA BLD QL SMEAR: ABNORMAL
LYMPHOCYTES NFR BLD: 15 %
MAGNESIUM SERPL-MCNC: 2.1 MG/DL
MCH RBC QN AUTO: 31.5 PG
MCHC RBC AUTO-ENTMCNC: 33.3 G/DL
MCV RBC AUTO: 94 FL
METAMYELOCYTES NFR BLD MANUAL: 1 %
MONOCYTES NFR BLD: 4 %
MYELOCYTES NFR BLD MANUAL: 3 %
NEUTROPHILS NFR BLD: 72 %
NEUTS BAND NFR BLD MANUAL: 5 %
NRBC BLD-RTO: 3 /100 WBC
OVALOCYTES BLD QL SMEAR: ABNORMAL
PHOSPHATE SERPL-MCNC: 3.3 MG/DL
PLATELET # BLD AUTO: 121 K/UL
PLATELET BLD QL SMEAR: ABNORMAL
PMV BLD AUTO: 10.6 FL
POIKILOCYTOSIS BLD QL SMEAR: SLIGHT
POLYCHROMASIA BLD QL SMEAR: ABNORMAL
POTASSIUM SERPL-SCNC: 4.6 MMOL/L
PROT SERPL-MCNC: 7.9 G/DL
RBC # BLD AUTO: 2.67 M/UL
SODIUM SERPL-SCNC: 139 MMOL/L
WBC # BLD AUTO: 29.06 K/UL

## 2018-01-03 PROCEDURE — 36415 COLL VENOUS BLD VENIPUNCTURE: CPT

## 2018-01-03 PROCEDURE — 85007 BL SMEAR W/DIFF WBC COUNT: CPT

## 2018-01-03 PROCEDURE — 83735 ASSAY OF MAGNESIUM: CPT

## 2018-01-03 PROCEDURE — 84100 ASSAY OF PHOSPHORUS: CPT

## 2018-01-03 PROCEDURE — 25000003 PHARM REV CODE 250: Performed by: INTERNAL MEDICINE

## 2018-01-03 PROCEDURE — 25000242 PHARM REV CODE 250 ALT 637 W/ HCPCS: Performed by: INTERNAL MEDICINE

## 2018-01-03 PROCEDURE — 97161 PT EVAL LOW COMPLEX 20 MIN: CPT

## 2018-01-03 PROCEDURE — 80053 COMPREHEN METABOLIC PANEL: CPT

## 2018-01-03 PROCEDURE — 85027 COMPLETE CBC AUTOMATED: CPT

## 2018-01-03 PROCEDURE — 94761 N-INVAS EAR/PLS OXIMETRY MLT: CPT

## 2018-01-03 PROCEDURE — 94640 AIRWAY INHALATION TREATMENT: CPT

## 2018-01-03 PROCEDURE — 97165 OT EVAL LOW COMPLEX 30 MIN: CPT

## 2018-01-03 PROCEDURE — 97530 THERAPEUTIC ACTIVITIES: CPT

## 2018-01-03 PROCEDURE — 99238 HOSP IP/OBS DSCHRG MGMT 30/<: CPT | Mod: ,,, | Performed by: HOSPITALIST

## 2018-01-03 RX ORDER — GUAIFENESIN 1200 MG/1
TABLET, EXTENDED RELEASE ORAL
Status: ON HOLD | COMMUNITY
Start: 2018-01-03 | End: 2018-02-21

## 2018-01-03 RX ADMIN — OXYBUTYNIN CHLORIDE 5 MG: 5 TABLET ORAL at 08:01

## 2018-01-03 RX ADMIN — PRAVASTATIN SODIUM 20 MG: 20 TABLET ORAL at 08:01

## 2018-01-03 RX ADMIN — IPRATROPIUM BROMIDE AND ALBUTEROL SULFATE 3 ML: .5; 3 SOLUTION RESPIRATORY (INHALATION) at 12:01

## 2018-01-03 RX ADMIN — BENZONATATE 100 MG: 100 CAPSULE ORAL at 08:01

## 2018-01-03 RX ADMIN — IPRATROPIUM BROMIDE AND ALBUTEROL SULFATE 3 ML: .5; 3 SOLUTION RESPIRATORY (INHALATION) at 07:01

## 2018-01-03 NOTE — PLAN OF CARE
Problem: Occupational Therapy Goal  Goal: Occupational Therapy Goal  No goals  Outcome: Outcome(s) achieved Date Met: 01/03/18  OT evaluation completed with treatment provided.  Recommend Home Health PT/OT/Cirae, DME: Seamus size RW, 3-in-1 commode. TRAV Jimenez 1/3/2018

## 2018-01-03 NOTE — PT/OT/SLP EVAL
Occupational Therapy   Evaluation/Treatment/Discharge    Name: Courtney Hammer  MRN: [id number]  Admitting Diagnosis:  Acute viral syndrome      Recommendations:     Discharge Recommendations:    Discharge Equipment Recommendations:  3-in-1 commode, walker, rolling (Seamus size RW)  Barriers to discharge:  Inaccessible home environment    History:     Occupational Profile:  Living Environment: lives with her  and daughter in a single story house (enter through the back of the house where there is 4 steps and a Left hand rail plus a ramp)  Previous level of function: ambulated holding onto furniture, Total A bath, dressing, assist toilet hygiene, MI G/H and self-feeding  Roles and Routines:none reported  Equipment Owned:  none  Assistance upon Discharge: she has a neighbor who assists with bathing and dressing 5 days/wk, with the  supervising her care, the daughter works day hours    Past Medical History:   Diagnosis Date    Anemia     Cataract     Encounter for blood transfusion     Hypertension     Sickle cell trait        Past Surgical History:   Procedure Laterality Date     SECTION      HYSTERECTOMY         Subjective     Chief Complaint: safety at home  Patient/Family stated goals: make her to assist more with self-care  Communicated with: patient and her daughter prior to session.  They were agreeable to OT service  Pain/Comfort:  · Pain Rating 1: 0/10  · Pain Rating Post-Intervention 1: 0/10    Objective:     Patient found with: peripheral IV    General Precautions: Standard, fall   Orthopedic Precautions:N/A   Braces: N/A     Occupational Performance:    Bed Mobility:    · SBA supine>sit EOB  · Supervision Assist sit>supine in bed    Functional Mobility/Transfers:  · SBA sit><stand with RW  · Min A toilet transfer with rW    Ambulated bed><bathroom with RW CGA (instruction for RW use)    Activities of Daily Living:  · Min A G/H (wash hands) at sink with RW - retrieval of soap and paper  towel  · Independent UBD ([facility] hospital gown, don camisole, night gown and robe sitting EOB  · Min A LBD (doff/don Depends Supervision Assist/Min A doff/don socks) seated EOB and on toilet    Cognitive/Visual Perceptual:  Cognitive/Psychosocial Skills:     -       Oriented to: Person, Place, Time and Situation   -       Follows Commands/attention:Follows one-step commands  -       Communication: clear/fluent  -       Memory: Impaired STM  -       Safety awareness/insight to disability: impaired   -       Mood/Affect/Coping skills/emotional control: Appropriate to situation  Visual/Perceptual:      -Impaired  acuity and glasses    Physical Exam:  Postural examination/scapula alignment:    -       Rounded shoulders  Skin integrity: Visible skin intact  Edema:  None noted  Sensation:    -       Intact for light touch both hands  Motor Planning:    -       SBA  Dominant hand:    -       Right  UE ROM:WFL BUE  UE Strength 4/5 BUE  Fine-Motor Coordination:  and dexterity WFL for self-care both hands  Gross motor coordination:   sitting balance WFL, impaired LE balance and gait, impaired safety judgement, assist with RW use and safety needed    Patient left supine with all lines intact, call button in reach, bed alarm off, case management  notified of need for Seamus size RW and patient's daughter present    AM PAC 6 Click:  AM PAC Total Score: 19    Treatment & Education:  RW use and safety (instructed patient and daughter), bed rail options to prevent pt falling out of bed (discussed), daughter's concerns about caregiver assist needs and way to maximize patient performance discussed  Education:    Assessment:     Courtnye Hammer is a 77 y.o. female with a medical diagnosis of Acute viral syndrome.  She presents with Performance deficits affecting function are impaired self care skills, impaired functional mobilty, gait instability, impaired balance, impaired cognition.effected performance during the session.  OT treatment  "was provided to instruct in RW use and maximize self-care skill in the home. D/C OT, hospital discharge planned for today; home health OT treatment recommended..    Rehab Prognosis:  good; No OT treatment scheduled.        Clinical Decision Makin.  OT Low:  "Pt evaluation falls under low complexity for evaluation coding due to performance deficits noted in 1-3 areas as stated above and 0 co-morbities affecting current functional status. Data obtained from problem focused assessments. No modifications or assistance was required for completion of evaluation. Only brief occupational profile and history review completed."     Plan:     Patient to be seen   to address the above listed problems via    · Plan of Care Expires:    · Plan of Care Reviewed with: patient, daughter    This Plan of care has been discussed with the patient who was involved in its development and understands and is in agreement with the identified goals and treatment plan    GOALS:    Occupational Therapy Goals     Not on file          Multidisciplinary Problems (Resolved)        Problem: Occupational Therapy Goal    Goal Priority Disciplines Outcome Interventions   Occupational Therapy Goal   (Resolved)     OT, PT/OT Outcome(s) achieved    Description:  No goals                    Time Tracking:     OT Date of Treatment: 18  OT Start Time: 1252  OT Stop Time: 1323  OT Total Time (min): 31 min    Billable Minutes:Evaluation 18  Therapeutic Activity 13    TRAV Jimenez  1/3/2018    "

## 2018-01-03 NOTE — PLAN OF CARE
Problem: Patient Care Overview  Goal: Plan of Care Review  Outcome: Ongoing (interventions implemented as appropriate)  Patient on RA. Sats 96 to 98%. Txs given. Pt. in no distress, will continue to monitor.

## 2018-01-03 NOTE — PROGRESS NOTES
VIK acknowledged HH order and met with pt and daughter, Maricruz and provided list of HH agencies in the [facility].  Daughter selected [facility].  SW called [facility], spoke to Erna [phone], informed of referral and faxed orders and medical records to [phone].

## 2018-01-03 NOTE — PLAN OF CARE
Annie from [facility] reports patient has been accepted under their care.      01/03/18 1427   Final Note   Assessment Type Final Discharge Note   [facility]   What phone number can be called within the next 1-3 days to see how you are doing after discharge? [phone]   Discharge plans and expectations educations in teach back method with documentation complete? Yes   Right Care Referral Info   Post Acute Recommendation Home-care   Facility Name [facility]

## 2018-01-03 NOTE — PLAN OF CARE
Spoke with Matilda at [facility]. Patient qualifies for both rolling walker & 3 in 1 commode but not hospital bed. Approval given to pull from our supply. Both pieces of DME delivered to patient's bedside.

## 2018-01-03 NOTE — HOSPITAL COURSE
CXR was done that showed no consolidative changes.  The patient however was given a brief course of Azithromycin and Ceftriaxone and tolerated this well.  The patient is suspected to have an acute viral syndrome causing an upper respiratory infection.  The patient did have her baseline leukocytosis, but remained afebrile with a stable anemia.  Heme/Onc was consulted and recommended discontinuance of Jakafi during her acute illness.  The patient is scheduled for an appointment with her Oncologist on 1/8/2018 at [facility], and is instructed to keep that scheduled appointment.  The patient was sent home with [facility], beside [facility], and rolling walker.

## 2018-01-03 NOTE — DISCHARGE INSTRUCTIONS
"  Viral Syndrome (Adult)  A viral illness may cause a number of symptoms. The symptoms depend on the part of the body that the virus affects. If it settles in your nose, throat, and lungs, it may cause cough, sore throat, congestion, and sometimes headache. If it settles in your stomach and intestinal tract, it may cause vomiting and diarrhea. Sometimes it causes vague symptoms like "aching all over," feeling tired, loss of appetite, or fever.  A viral illness usually lasts 1 to 2 weeks, but sometimes it lasts longer. In some cases, a more serious infection can look like a viral syndrome in the first few days of the illness. You may need another exam and additional tests to know the difference. Watch for the warning signs listed below.  Home care  Follow these guidelines for taking care of yourself at home:  · If symptoms are severe, rest at home for the first 2 to 3 days.  · Stay away from cigarette smoke - both your smoke and the smoke from others.  · You may use over-the-counter acetaminophen or ibuprofen for fever, muscle aching, and headache, unless another medicine was prescribed for this. If you have chronic liver or kidney disease or ever had a stomach ulcer or GI bleeding, talk with your doctor before using these medicines. No one who is younger than 18 and ill with a fever should take aspirin. It may cause severe disease or death.  · Your appetite may be poor, so a light diet is fine. Avoid dehydration by drinking 8 to 12 8-ounce glasses of fluids each day. This may include water; orange juice; lemonade; apple, grape, and cranberry juice; clear fruit drinks; electrolyte replacement and sports drinks; and decaffeinated teas and coffee. If you have been diagnosed with a kidney disease, ask your doctor how much and what types of fluids you should drink to prevent dehydration. If you have kidney disease, drinking too much fluid can cause it build up in the your body and be dangerous to your " health.  · Over-the-counter remedies won't shorten the length of the illness but may be helpful for cough, sore throat; and nasal and sinus congestion. Don't use decongestants if you have high blood pressure.  Follow-up care  Follow up with your healthcare provider if you do not improve over the next week.  Call 911  Get emergency medical care if any of the following occur:  · Convulsion  · Feeling weak, dizzy, or like you are going to faint  · Chest pain, shortness of breath, wheezing, or difficulty breathing  When to seek medical advice  Call your healthcare provider right away if any of these occur:  · Cough with lots of colored sputum (mucus) or blood in your sputum  · Chest pain, shortness of breath, wheezing, or difficulty breathing  · Severe headache; face, neck, or ear pain  · Severe, constant pain in the lower right side of your belly (abdominal)  · Continued vomiting (cant keep liquids down)  · Frequent diarrhea (more than 5 times a day); blood (red or black color) or mucus in diarrhea  · Feeling weak, dizzy, or like you are going to faint  · Extreme thirst  · Fever of 100.4°F (38°C) or higher, or as directed by your healthcare provider  Date Last Reviewed: 9/25/2015  © 7767-2289 [employer]. [address] [place] [address], [place] [address]. All rights reserved. This information is not intended as a substitute for professional medical care. Always follow your healthcare professional's instructions.

## 2018-01-03 NOTE — HPI
Courtney Hammer is a 77 y.o. AA female who  has a past medical history of Anemia; Cataract; Encounter for blood transfusion; Hypertension; and Sickle cell trait.. The patient presented to [facility] on 1/1/2018 with a primary complaint of Pneumonia (Sent from [facility] ER with diagnosis of pneumonia.  VS, per EMS, stable in route. Reports, dry, nonproductive cough)     The patient was in their usual state of health until 4 days ago when she began to have a cough and increased sob.  Pt endorses cough since that time that has gotten worse. She saw her pcp and was stated on abx for bronchitis with no improvement.  She presented to the ED for treatment.  She was noted to be anemic and they decided [place] [facility] [place] and consultation to the leukemia service.  The transfer center diverted the patient to [facility].

## 2018-01-03 NOTE — PLAN OF CARE
Problem: Physical Therapy Goal  Goal: Physical Therapy Goal  Outcome: Outcome(s) achieved Date Met: 01/03/18  PT evaluation completed. Please see progress note for details, POC, and recommendations.

## 2018-01-03 NOTE — PLAN OF CARE
[facility]    HOME HEALTH ORDERS  FACE TO FACE ENCOUNTER    Patient Name: Courtney Hammer  YOB: 1940    PCP: Florencoi Pacheco MD   PCP Address: [address] / [place] [address]  PCP Phone Number: [phone]  PCP Fax: [phone]       Encounter Date: 01/03/2018    Admit to Home Health    Diagnoses:  Active Hospital Problems    Diagnosis  POA    *Acute viral syndrome [B34.9]  Yes     Priority: 1 - High    Myeloproliferative neoplasm [D47.1]  Yes     Priority: 2     Essential thrombocythemia [D47.3]  Yes     Priority: 3     Anemia [D64.9]  Yes     Priority: 4     Physical debility [R53.81]  Yes     Priority: 5     Leukocytosis [D72.829]  Yes      Resolved Hospital Problems    Diagnosis Date Resolved POA   No resolved problems to display.       Future Appointments  Date Time Provider Department Center   1/15/2018 10:00 AM LAB, HEMONC SAME DAY [facility] LAB HO [place]   1/15/2018 11:00 AM Bebeto Parmar MD [facility] HEM ONC [place]     Follow-up Information     Florencio Pacheco MD.    Specialty:  Family Medicine  Contact information:  [address]  [place] [address]  [phone]                 I have seen and examined this patient face to face today. My clinical findings that support the need for the home health skilled services and home bound status are the following:  Weakness/numbness causing balance and gait disturbance due to Weakness/Debility, Anemia and Malignancy/Cancer making it taxing to leave home.  Requiring assistive device to leave home due to unsteady gait caused by  Weakness/Debility, Anemia and Malignancy/Cancer.  Medical restrictions requiring assistance of another human to leave home due to  Unstable ambulation.    Allergies:Review of patient's allergies indicates:  No Known Allergies    Diet: regular diet    Activities: activity as tolerated    Nursing:   SN to complete comprehensive assessment including routine vital signs. Instruct on disease process and  s/s of complications to report to MD. Review/verify medication list sent home with the patient at time of discharge  and instruct patient/caregiver as needed. Frequency may be adjusted depending on start of care date.    Notify MD if SBP > 160 or < 90; DBP > 90 or < 50; HR > 120 or < 50; Temp > 101; Other:         CONSULTS:    Physical Therapy to evaluate and treat. Evaluate for home safety and equipment needs; Establish/upgrade home exercise program. Perform / instruct on therapeutic exercises, gait training, transfer training, and Range of Motion.  Occupational Therapy to evaluate and treat. Evaluate home environment for safety and equipment needs. Perform/Instruct on transfers, ADL training, ROM, and therapeutic exercises.  Aide to provide assistance with personal care, ADLs, and vital signs.    MISCELLANEOUS CARE:  N/A    WOUND CARE ORDERS  n/a      Medications: Review discharge medications with patient and family and provide education.      Current Discharge Medication List      START taking these medications    Details   guaiFENesin (MUCINEX) 1,200 mg Ta12 Take 2 tablets every 12 hours      walker Misc Rolling walker  Qty: 1 each, Refills: 0         CONTINUE these medications which have NOT CHANGED    Details   benzonatate (TESSALON) 200 MG capsule TK ONE C PO  Q EIGHT H PRN COUGH  Refills: 0      calcium carbonate-vitamin D3 (CALCIUM 600 + D,3,) 600 mg calcium- 200 unit Cap Take by mouth once daily.      EPIGALLOCATECHIN GALLATE (GREEN TEA EXTRACT MISC) by Misc.(Non-Drug; Combo Route) route.      fluticasone (FLONASE) 50 mcg/actuation nasal spray 1 spray by Each Nare route once daily.      FLUZONE HIGH-DOSE 2017-18, PF, 180 mcg/0.5 mL vaccine       FOLIC ACID-B QJPN-L-UADEE-ZINC 3-70-15 MG-MCG-MG ORAL TAB Take by mouth.      latanoprost 0.005 % ophthalmic solution       megestrol (MEGACE) 400 mg/10 mL (40 mg/mL) Susp Refills: 2      meloxicam (MOBIC) 15 MG tablet Take 15 mg by mouth once daily.      NIACIN  ORAL Take by mouth 2 (two) times daily.      oxybutynin (DITROPAN) 5 MG Tab TK 1 T PO  BID.  Refills: 6      pravastatin (PRAVACHOL) 20 MG tablet Take 20 mg by mouth once daily.      promethazine (PHENERGAN) 6.25 mg/5 mL syrup TK 10 ML PO Q 8 HOURS PRF COUGH  Refills: 1      TRADJENTA 5 mg Tab tablet TK 1 T PO QD  Refills: 11         STOP taking these medications       JAKAFI 20 mg Tab Comments:   Reason for Stopping:         PREVNAR 13, PF, 0.5 mL Syrg Comments:   Reason for Stopping:         promethazine-codeine 6.25-10 mg/5 ml (PHENERGAN WITH CODEINE) 6.25-10 mg/5 mL syrup Comments:   Reason for Stopping:         ruxolitinib (JAKAFI) 10 mg Tab Comments:   Reason for Stopping:         valsartan-hydrochlorothiazide (DIOVAN-HCT) 80-12.5 mg per tablet Comments:   Reason for Stopping:               I certify that this patient is confined to her home and needs intermittent skilled nursing care, physical therapy and occupational therapy.

## 2018-01-03 NOTE — DISCHARGE SUMMARY
[facility]  Hospital Medicine  Discharge Summary      Patient Name: Courtney Hammer  MRN: [id number]  Admission Date: 1/1/2018  Hospital Length of Stay: 1 days  Discharge Date and Time:  01/03/2018 12:32 PM  Attending Physician: Fern Borja MD   Discharging Provider: Fern Borja MD  Primary Care Provider: Florencio Pacheco MD      HPI:   Courtney Hammer is a 77 y.o. AA female who  has a past medical history of Anemia; Cataract; Encounter for blood transfusion; Hypertension; and Sickle cell trait.. The patient presented to [facility] on 1/1/2018 with a primary complaint of Pneumonia (Sent from [facility] ER with diagnosis of pneumonia.  VS, per EMS, stable in route. Reports, dry, nonproductive cough)     The patient was in their usual state of health until 4 days ago when she began to have a cough and increased sob.  Pt endorses cough since that time that has gotten worse. She saw her pcp and was stated on abx for bronchitis with no improvement.  She presented to the ED for treatment.  She was noted to be anemic and they decided to transfer to [facility] for transfusion and consultation to the leukemia service.  The transfer center diverted the patient to [facility].       * No surgery found *      Hospital Course:   CXR was done that showed no consolidative changes.  The patient however was given a brief course of Azithromycin and Ceftriaxone and tolerated this well.  The patient is suspected to have an acute viral syndrome causing an upper respiratory infection.  The patient did have her baseline leukocytosis, but remained afebrile with a stable anemia.  Heme/Onc was consulted and recommended discontinuance of Jakafi during her acute illness.  The patient is scheduled for an appointment with her Oncologist on 1/8/2018 at [facility], and is instructed to keep that scheduled appointment.  The patient was sent home with home health, beside commode, and rolling walker.       Consults:   Consults   "       Status Ordering Provider     Inpatient consult to Hematology/Oncology  Once     Provider:  Pauly Perry MD    Completed MANUEL COON          No new Assessment & Plan notes have been filed under this hospital service since the last note was generated.  Service: Hospital Medicine    Final Active Diagnoses:    Diagnosis Date Noted POA    PRINCIPAL PROBLEM:  Acute viral syndrome [B34.9] 01/03/2018 Yes    Myeloproliferative neoplasm [D47.1] 01/02/2018 Yes    Essential thrombocythemia [D47.3] 07/02/2015 Yes    Anemia [D64.9] 07/02/2015 Yes    Physical debility [R53.81] 01/03/2018 Yes    Leukocytosis [D72.829] 01/02/2018 Yes      Problems Resolved During this Admission:    Diagnosis Date Noted Date Resolved POA       Discharged Condition: good    Disposition: Home-Health Care [facility]    Follow Up:  Follow-up Information     Florencio Pacheco MD.    Specialty:  Family Medicine  Contact information:  [address] [place] [address]  [phone]                 Patient Instructions:     COMMODE FOR HOME USE   Order Specific Question Answer Comments   Type: Standard    Height: 5' 1" (1.549 m)    Weight: 45.5 kg (100 lb 5 oz)    Does patient have medical equipment at home? none    Length of need (1-99 months): 99      WALKER FOR HOME USE   Order Specific Question Answer Comments   Type of Walker: Seamus (4'4"-5'7")    With wheels? Yes    Height: 5' 1" (1.549 m)    Weight: 45.5 kg (100 lb 5 oz)    Length of need (1-99 months): 99    Does patient have medical equipment at home? none    Please check all that apply: Patient's condition impairs ambulation.    Please check all that apply: Patient is unable to safely ambulate without equipment.    Please check all that apply: Patient needs help to get in and out of chair.    Please check all that apply: Walker will be used for gait training.      Diet Adult Regular     Activity as tolerated         Significant Diagnostic Studies: Labs:   CMP   Recent Labs  Lab " 01/01/18 2242 01/02/18 0421 01/03/18  0329    137 139   K 4.1 4.1 4.6    105 106   CO2 26 25 25   GLU 80 95 89   BUN 16 15 12   CREATININE 1.4 1.3 1.2   CALCIUM 9.4 8.9 9.0   PROT 8.5* 7.6 7.9   ALBUMIN 3.8 3.3* 3.3*   BILITOT 0.6 0.6 0.5   ALKPHOS 60 54* 61   AST 52* 47* 48*   ALT 36 32 34   ANIONGAP 11 7* 8   ESTGFRAFRICA 42* 46* 50*   EGFRNONAA 36* 40* 44*    and CBC   Recent Labs  Lab 01/01/18 2242 01/02/18 0421 01/03/18  0329   WBC 31.60* 28.43* 29.06*   HGB 7.3* 7.6* 8.4*   HCT 22.4* 23.3* 25.2*    120* 121*     Microbiology:   Blood Culture   Lab Results   Component Value Date    LABBLOO No Growth to date 01/01/2018    LABBLOO No Growth to date 01/01/2018       Pending Diagnostic Studies:     None         Medications:  Reconciled Home Medications:   Current Discharge Medication List      START taking these medications    Details   guaiFENesin (MUCINEX) 1,200 mg Ta12 Take 2 tablets every 12 hours      walker Misc Rolling walker  Qty: 1 each, Refills: 0         CONTINUE these medications which have NOT CHANGED    Details   benzonatate (TESSALON) 200 MG capsule TK ONE C PO  Q EIGHT H PRN COUGH  Refills: 0      calcium carbonate-vitamin D3 (CALCIUM 600 + D,3,) 600 mg calcium- 200 unit Cap Take by mouth once daily.      EPIGALLOCATECHIN GALLATE (GREEN TEA EXTRACT MISC) by Misc.(Non-Drug; Combo Route) route.      fluticasone (FLONASE) 50 mcg/actuation nasal spray 1 spray by Each Nare route once daily.      FLUZONE HIGH-DOSE 2017-18, PF, 180 mcg/0.5 mL vaccine       FOLIC ACID-B TVWL-G-YLPNI-ZINC 3-70-15 MG-MCG-MG ORAL TAB Take by mouth.      latanoprost 0.005 % ophthalmic solution       megestrol (MEGACE) 400 mg/10 mL (40 mg/mL) Susp Refills: 2      meloxicam (MOBIC) 15 MG tablet Take 15 mg by mouth once daily.      NIACIN ORAL Take by mouth 2 (two) times daily.      oxybutynin (DITROPAN) 5 MG Tab TK 1 T PO  BID.  Refills: 6      pravastatin (PRAVACHOL) 20 MG tablet Take 20 mg by mouth once  daily.      promethazine (PHENERGAN) 6.25 mg/5 mL syrup TK 10 ML PO Q 8 HOURS PRF COUGH  Refills: 1      TRADJENTA 5 mg Tab tablet TK 1 T PO QD  Refills: 11         STOP taking these medications       JAKAFI 20 mg Tab Comments:   Reason for Stopping:         PREVNAR 13, PF, 0.5 mL Syrg Comments:   Reason for Stopping:         promethazine-codeine 6.25-10 mg/5 ml (PHENERGAN WITH CODEINE) 6.25-10 mg/5 mL syrup Comments:   Reason for Stopping:         ruxolitinib (JAKAFI) 10 mg Tab Comments:   Reason for Stopping:         valsartan-hydrochlorothiazide (DIOVAN-HCT) 80-12.5 mg per tablet Comments:   Reason for Stopping:               Indwelling Lines/Drains at time of discharge:   Lines/Drains/Airways          No matching active lines, drains, or airways          Time spent on the discharge of patient: 20 minutes  Patient was seen and examined on the date of discharge and determined to be suitable for discharge.         Fern Borja MD  Department of Hospital Medicine  [facility]

## 2018-01-03 NOTE — NURSING
Eager & in agreement w/ DC. VU of DC instructions--paperwork explained.  IV removed w/ cath tip intact, WNL. Verified HH & DME set up via [facility] & walker has arrived to pt room.  To be DCd home w/ family-- will be escorted downstairs via  transport team once dressed and ready Free from falls, injury, or skin breakdown this hospital admission.

## 2018-01-03 NOTE — PT/OT/SLP EVAL
Physical Therapy Evaluation/Discharge    Patient Name:  Courtney Hammer   MRN:  [id number]    Recommendations:     Discharge Recommendations:  home with home health, home health PT, home health OT   Discharge Equipment Recommendations:  (DME RW and BSC delivered )   Barriers to discharge: None    Assessment:     Courtney Hammer is a 77 y.o. female admitted with a medical diagnosis of Acute viral syndrome.  She presents with the following impairments/functional limitations:  weakness, gait instability, impaired functional mobilty, impaired balance, decreased safety awareness. Pt educated on proper RW use during gait and transfers, and demonstrated understanding. Pt safe for discharge home with family.     Rehab Prognosis:  Good; patient would benefit from acute skilled PT services to address these deficits and reach maximum level of function.      Recent Surgery: * No surgery found *      Plan:     During this hospitalization, patient to be seen   to address the above listed problems via gait training, therapeutic activities, neuromuscular re-education, therapeutic exercises  · Plan of Care Expires:  02/02/18   Plan of Care Reviewed with: patient, family    Subjective     Communicated with RN prior to session.  Patient found Supine upon PT entry to room, agreeable to evaluation.      Chief Complaint: None  Patient comments/goals: To return home  Pain/Comfort:  · Pain Rating 1: 0/10  · Pain Rating Post-Intervention 1: 0/10    Patients cultural, spiritual, Zoroastrian conflicts given the current situation: None specified    Living Environment:  lives with her  and daughter in a single story house and a ramp entrance.   Prior to admission, patients level of function was ambulated holding onto furniture, Total A bathing, dressing, assist toilet hygiene. Pt daughter reports the patient has fallen 4 times this past month, and 3 times this past week prior to admit. Patient has the following equipment: none.  DME owned (not  currently used): none.  Upon discharge, patient will have assistance from family.    Objective:     Patient found with: peripheral IV     General Precautions: Standard, fall   Orthopedic Precautions:N/A     Exams:  · Cognition: Patient is oriented to Person, Place and Time and follows approximately 100% of one commands.    · Posture:    · -       Rounded shoulders  · -       Forward head  · Sensation: Intact to light touch bilateral LEs.   · Skin Integrity: Visible skin intact  · Edema: None noted   · Coordination: No coordination impairments identified with functional mobility. No formal testing performed.   · LE ROM/Strength: BLE 4-     Functional Mobility:  · Bed Mobility:     · Supine to Sit: stand by assistance with HOB flat and use of bed rail  · Transfers:     · Sit to Stand:  contact guard assistance with rolling walker, x 2 from EOB x 1 from bedside chair, pt required verbal cues for hand placement and safety for descent into sitting   · Gait: 1 x 30'; pt required verbal cues for walker management and safety, and demonstration and verbal cues for turning 180*.     AM-PAC 6 CLICK MOBILITY  Total Score:19     Patient left up in chair with all lines intact, call button in reach, RN notified and family present.    GOALS:    Physical Therapy Goals     Not on file          Multidisciplinary Problems (Resolved)        Problem: Physical Therapy Goal    Goal Priority Disciplines Outcome Goal Variances Interventions   Physical Therapy Goal   (Resolved)     PT/OT, PT Outcome(s) achieved                     History:     Past Medical History:   Diagnosis Date    Anemia     Cataract     Encounter for blood transfusion     Hypertension     Sickle cell trait        Past Surgical History:   Procedure Laterality Date     SECTION      HYSTERECTOMY         Clinical Decision Making:     History  Co-morbidities and personal factors that may impact the plan of care Examination  Body Structures and Functions,  activity limitations and participation restrictions that may impact the plan of care Clinical Presentation   Decision Making/ Complexity Score   Co-morbidities:   [] Time since onset of injury / illness / exacerbation  [] Status of current condition  []Patient's cognitive status and safety concerns    [] Multiple Medical Problems (see med hx)  Personal Factors:   [] Patient's age  [] Prior Level of function   [] Patient's home situation (environment and family support)  [] Patient's level of motivation  [] Expected progression of patient      HISTORY:(criteria)    [] 33352 - no personal factors/history    [] 83653 - has 1-2 personal factor/comorbidity     [] 64091 - has >3 personal factor/comorbidity     Body Regions:  [] Objective examination findings  [] Head     []  Neck  [] Trunk   [] Upper Extremity  [] Lower Extremity    Body Systems:  [] For communication ability, affect, cognition, language, and learning style: the assessment of the ability to make needs known, consciousness, orientation (person, place, and time), expected emotional /behavioral responses, and learning preferences (eg, learning barriers, education  needs)  [] For the neuromuscular system: a general assessment of gross coordinated movement (eg, balance, gait, locomotion, transfers, and transitions) and motor function  (motor control and motor learning)  [] For the musculoskeletal system: the assessment of gross symmetry, gross range of motion, gross strength, height, and weight  [] For the integumentary system: the assessment of pliability(texture), presence of scar formation, skin color, and skin integrity  [] For cardiovascular/pulmonary system: the assessment of heart rate, respiratory rate, blood pressure, and edema     Activity limitations:    [] Patient's cognitive status and saf ety concerns          [] Status of current condition      [] Weight bearing restriction  [] Cardiopulmunary Restriction    Participation Restrictions:   []  Goals and goal agreement with the patient     [] Rehab potential (prognosis) and probable outcome      Examination of Body System: (criteria)    [] 48520 - addressing 1-2 elements    [] 41935 - addressing a total of 3 or more elements     [] 29271 -  Addressing a total of 4 or more elements         Clinical Presentation: (criteria)  Choose one     On examination of body system using standardized tests and measures patient presents with (CHOOSE ONE) elements from any of the following: body structures and functions, activity limitations, and/or participation restrictions.  Leading to a clinical presentation that is considered (CHOOSE ONE)                              Clinical Decision Making  (Eval Complexity):  Choose One     Time Tracking:     PT Received On: 01/03/18  PT Start Time: 1433     PT Stop Time: 1450  PT Total Time (min): 17 min     Billable Minutes: Evaluation 17    Cynthia Harrington, PT, DPT  01/03/2018

## 2018-01-07 LAB
BACTERIA BLD CULT: NORMAL
BACTERIA BLD CULT: NORMAL

## 2018-01-12 NOTE — PROGRESS NOTES
CC: MPN, follow up    HPI: Mrs. 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 Her weakness has improved and both her platelet and WBC have risen.  Too early to say whether there is a trend to rise for her hemoglobin on its own.   She had not required a blood transfusion since 3/2016 but she was transfused 3 U PRBC on 10/9/17-11/20/17.  None since then.     No fever or other new problems.  She is active despite her anemia.  Diabetes now controlled.     History:  She was first noted to have an elevated platelet count in early 2011 with CBC 6/7/11 showing WBC 56.9K, Hb 12.0, Plt 1090K (74P, 21L).  On 7/8/11 she had a marrow biopsy showing 90% cellularity with megakaryocyte hyperplasia with dysmorphic features. Myeloid hyperplasia with left shift. No lymphoid infiltrate or increased blasts. Flow cytometry showed virtually no B cells but no other concerning features. Cyt[employer]ics 46,XY[20]. Bcr-abl negative. Testing for Jak2 V617F on 7/7/15 was positive in 5% of total Jak2 DNA. Additional testing that same day for CalR and Mpl exon 10 mutation were both negative. Hence, she was diagnosed with Jak2+ ET.     She was started on hydroxyurea and ASA for essential thrombocythemia and had been stable on this until she began to require blood transfusions early in 2015. Her RBC transfusion needs slowly increased. She was continued on 1500 mg hydroxyurea, her same dose for many years. This has been decreased but her transfusion needs continue along with an alarming increase in her WBC.     CBC 6/18/15 WBC 31.8K, hemoglobin 4.4, platelets 212K. She received 4U PRBC and CBC on 6/30 WBC 23K, hemoglobin 10.3, platelets 97K.  A repeat marrow on 1/26/16 showed a 100% cellular marrow with increased megakaryocytes and storage iron and only occasional blasts on CD34 staining (morphologic blast count 1%).  Jak2 V617F mutation was again detected  without CALR or MPL exon 10 mutations.     Hence, she was started on Ruxolitinib 20 mg bid 2/2015.  This was cut to 10 mg bid as of 10/9/17. She was hospitalized for pneumonia just before new year.     Review of Systems   Constitutional: Positive for malaise/fatigue. Negative for fever and weight loss.   HENT: Negative for congestion and nosebleeds.    Eyes: Negative for blurred vision and double vision.   Respiratory: Positive for cough. Negative for hemoptysis and sputum production.    Cardiovascular: Negative for chest pain.   Gastrointestinal: Negative for abdominal pain, diarrhea and heartburn.   Genitourinary: Negative for dysuria and hematuria.   Musculoskeletal: Negative for myalgias.   Neurological: Positive for weakness. Negative for dizziness and sensory change.   Endo/Heme/Allergies: Does not bruise/bleed easily.   Psychiatric/Behavioral: Negative for depression.         Current Outpatient Prescriptions   Medication Sig    benzonatate (TESSALON) 200 MG capsule TK ONE C PO  Q EIGHT H PRN COUGH    calcium carbonate-vitamin D3 (CALCIUM 600 + D,3,) 600 mg calcium- 200 unit Cap Take by mouth once daily.    EPIGALLOCATECHIN GALLATE (GREEN TEA EXTRACT MISC) by Misc.(Non-Drug; Combo Route) route.    fluticasone (FLONASE) 50 mcg/actuation nasal spray 1 spray by Each Nare route once daily.    FLUZONE HIGH-DOSE 2017-18, PF, 180 mcg/0.5 mL vaccine     FOLIC ACID-B KZFI-E-HVPHI-ZINC 3-70-15 MG-MCG-MG ORAL TAB Take by mouth.    guaiFENesin (MUCINEX) 1,200 mg Ta12 Take 2 tablets every 12 hours    latanoprost 0.005 % ophthalmic solution     megestrol (MEGACE) 400 mg/10 mL (40 mg/mL) Susp     meloxicam (MOBIC) 15 MG tablet Take 15 mg by mouth once daily.    NIACIN ORAL Take by mouth 2 (two) times daily.    oxybutynin (DITROPAN) 5 MG Tab TK 1 T PO  BID.    pravastatin (PRAVACHOL) 20 MG tablet Take 20 mg by mouth once daily.    promethazine (PHENERGAN) 6.25 mg/5 mL syrup TK 10 ML PO Q 8 HOURS PRF COUGH     TRADJENTA 5 mg Tab tablet TK 1 T PO QD    walker Misc Rolling walker     No current facility-administered medications for this visit.          Past Medical History:   Diagnosis Date    Anemia     Cataract     Encounter for blood transfusion     Hypertension     Sickle cell trait          Past Surgical History:   Procedure Laterality Date     SECTION      HYSTERECTOMY         Family History   Problem Relation Age of Onset    Thyroid disease Mother     Sickle cell anemia Father     Prostate cancer Brother        Social History     Social History    Marital status:      Spouse name: N/A    Number of children: N/A    Years of education: N/A     Occupational History    Not on file.     Social History Main Topics    Smoking status: Never Smoker    Smokeless tobacco: Never Used    Alcohol use No    Drug use: No    Sexual activity: Not on file     Other Topics Concern    Not on file     Social History Narrative    No narrative on file         Review of patient's allergies indicates: No Known Allergies    Current Outpatient Prescriptions   Medication Sig    benzonatate (TESSALON) 200 MG capsule TK ONE C PO  Q EIGHT H PRN COUGH    calcium carbonate-vitamin D3 (CALCIUM 600 + D,3,) 600 mg calcium- 200 unit Cap Take by mouth once daily.    EPIGALLOCATECHIN GALLATE (GREEN TEA EXTRACT MISC) by Misc.(Non-Drug; Combo Route) route.    fluticasone (FLONASE) 50 mcg/actuation nasal spray 1 spray by Each Nare route once daily.    FLUZONE HIGH-DOSE , PF, 180 mcg/0.5 mL vaccine     FOLIC ACID-B [id number]-J-[id number]-ZINC 3-70-15 MG-MCG-MG ORAL TAB Take by mouth.    guaiFENesin (MUCINEX) 1,200 mg Ta12 Take 2 tablets every 12 hours    latanoprost 0.005 % ophthalmic solution     megestrol (MEGACE) 400 mg/10 mL (40 mg/mL) Susp     meloxicam (MOBIC) 15 MG tablet Take 15 mg by mouth once daily.    NIACIN ORAL Take by mouth 2 (two) times daily.    oxybutynin (DITROPAN) 5 MG Tab TK 1 T PO  BID.     pravastatin (PRAVACHOL) 20 MG tablet Take 20 mg by mouth once daily.    promethazine (PHENERGAN) 6.25 mg/5 mL syrup TK 10 ML PO Q 8 HOURS PRF COUGH    TRADJENTA 5 mg Tab tablet TK 1 T PO QD    walker Misc Rolling walker     No current facility-administered medications for this visit.          Vitals:    01/15/18 1059   BP: 125/67   Pulse: (!) 119   Resp: 14   Temp: 98.2 °F (36.8 °C)     Physical Exam   Constitutional: She is oriented to person, place, and time. She appears well-developed. No distress.   HENT:   Head: Normocephalic and atraumatic.   Mouth/Throat: No oropharyngeal exudate.   Eyes: Pupils are equal, round, and reactive to light. No scleral icterus.   Neck: Normal range of motion.   Cardiovascular: Regular rhythm.    No murmur heard.  She is tachycardic   Pulmonary/Chest: Effort normal. No respiratory distress. She has no wheezes. She has no rales.   Abdominal: Soft. She exhibits distension. There is no tenderness. There is no guarding.   Musculoskeletal: She exhibits no edema.   Lymphadenopathy:     She has no cervical adenopathy.   Neurological: She is alert and oriented to person, place, and time. No cranial nerve deficit.   Skin: Skin is warm.   Psychiatric: She has a normal mood and affect.       Component      Latest Ref Rng & Units 1/15/2018   WBC      3.90 - 12.70 K/uL 71.54 (HH)   RBC      4.00 - 5.40 M/uL 3.03 (L)   Hemoglobin      12.0 - 16.0 g/dL 9.6 (L)   Hematocrit      37.0 - 48.5 % 29.5 (L)   MCV      82 - 98 fL 97   MCH      27.0 - 31.0 pg 31.7 (H)   MCHC      32.0 - 36.0 g/dL 32.5   RDW      11.5 - 14.5 % 16.9 (H)   Platelets      150 - 350 K/uL 142 (L)   MPV      9.2 - 12.9 fL 11.9       Component      Latest Ref Rng & Units 1/15/2018   Sodium      136 - 145 mmol/L 140   Potassium      3.5 - 5.1 mmol/L 4.1   Chloride      95 - 110 mmol/L 104   CO2      23 - 29 mmol/L 25   Glucose      70 - 110 mg/dL 169 (H)   BUN, Bld      8 - 23 mg/dL 18   Creatinine      0.5 - 1.4 mg/dL 1.5 (H)    Calcium      8.7 - 10.5 mg/dL 9.9   Total Protein      6.0 - 8.4 g/dL 9.2 (H)   Albumin      3.5 - 5.2 g/dL 3.5   Total Bilirubin      0.1 - 1.0 mg/dL 0.8   Alkaline Phosphatase      55 - 135 U/L 107   AST      10 - 40 U/L 47 (H)   ALT      10 - 44 U/L 27   Anion Gap      8 - 16 mmol/L 11   eGFR if African American      >60 mL/min/1.73 m:2 38.5 (A)   eGFR if non African American      >60 mL/min/1.73 m:2 33.4 (A)   Uric Acid      2.4 - 5.7 mg/dL 9.2 (H)   LD      110 - 260 U/L 1,439 (H)     Assessment:    1.Ms. 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 A repeat marrow on 1/26/16 showed a 100% cellular marrow with increased megakaryocytes and storage iron and only occasional blasts on CD34 staining (morphologic blast count 1%).  Jak2 V617F mutation was again detected without CalR or Mpl exon 10 mutations.     Hence, she was started on Ruxolitinib 20 mg bid 2/2016.  Her WBC had been controlled since that time but all of her counts fell significantly but 10/9/17 with WBC 16K, hemoglobin 5 and platelets 81K.  Hence, she received 2U RBC ruxolitinib was decreased to 10 mg BID with improvement in her counts and acceptable WBC. Ruxolitinib was held around new year 2018 after she was hospitalized at [facility] for likely pneumonia. She has still not started the medication. She believes she never got the bottle of her Jakafi back from [facility] ( she had supplied her own medication while there) after her discharge. I called [facility] in-patient pharmacy who directed the call to the floor ( 3 S). The medication could not be traced.  Her WBC count  is  71.54k today. Differential count shows predominantly granulocytes, bands. No blasts. I prescribed Hydrea 1g BID, with bi-weekly CBC, until Ruxolitinib can be resumed.    2. 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 She will start Allopurinol 100mg daily    3. Thrombocytoepnia: Mild, asymptomatic.        4. Anemia: She has normocytic, hyperchromic anemia.  Her anemia is chronic.       5. Hyperglycemia: Blood glucose is 169 today. Her hyperglycemia has improved and there is a family history of late onset diabetes. Dr. Pacheco, her primary care attending, will continue to follow up on her diabetes.    Plan:  1. Hydrea 1g BID until Jakafi can be refilled  2. CBC, CMP, LDH, uric acid bi-weekly  3. Await Jakafi refill  4. Allopurinol 100mg daily    Distress Screening Results: Psychosocial Distress screening score of Distress Score: 0 noted and reviewed. No intervention indicated.

## 2018-01-15 ENCOUNTER — LAB VISIT (OUTPATIENT)
Dept: LAB | Facility: HOSPITAL | Age: 78
End: 2018-01-15
Attending: INTERNAL MEDICINE
Payer: MEDICARE

## 2018-01-15 ENCOUNTER — OFFICE VISIT (OUTPATIENT)
Dept: HEMATOLOGY/ONCOLOGY | Facility: CLINIC | Age: 78
End: 2018-01-15
Payer: MEDICARE

## 2018-01-15 ENCOUNTER — TELEPHONE (OUTPATIENT)
Dept: PHARMACY | Facility: CLINIC | Age: 78
End: 2018-01-15

## 2018-01-15 ENCOUNTER — TELEPHONE (OUTPATIENT)
Dept: HEMATOLOGY/ONCOLOGY | Facility: CLINIC | Age: 78
End: 2018-01-15

## 2018-01-15 VITALS
DIASTOLIC BLOOD PRESSURE: 67 MMHG | TEMPERATURE: 98 F | OXYGEN SATURATION: 98 % | HEIGHT: 60 IN | HEART RATE: 119 BPM | BODY MASS INDEX: 19.65 KG/M2 | WEIGHT: 100.06 LBS | RESPIRATION RATE: 14 BRPM | SYSTOLIC BLOOD PRESSURE: 125 MMHG

## 2018-01-15 DIAGNOSIS — T45.1X5A ANEMIA DUE TO ANTINEOPLASTIC CHEMOTHERAPY: ICD-10-CM

## 2018-01-15 DIAGNOSIS — D64.81 ANEMIA DUE TO ANTINEOPLASTIC CHEMOTHERAPY: ICD-10-CM

## 2018-01-15 DIAGNOSIS — D72.828 OTHER ELEVATED WHITE BLOOD CELL (WBC) COUNT: ICD-10-CM

## 2018-01-15 DIAGNOSIS — D47.3 ESSENTIAL THROMBOCYTHEMIA: ICD-10-CM

## 2018-01-15 DIAGNOSIS — D47.1 MYELOPROLIFERATIVE NEOPLASM: Primary | ICD-10-CM

## 2018-01-15 DIAGNOSIS — E79.0 HYPERURICEMIA: ICD-10-CM

## 2018-01-15 DIAGNOSIS — D69.6 THROMBOCYTOPENIA: ICD-10-CM

## 2018-01-15 LAB
ALBUMIN SERPL BCP-MCNC: 3.5 G/DL
ALP SERPL-CCNC: 107 U/L
ALT SERPL W/O P-5'-P-CCNC: 27 U/L
ANION GAP SERPL CALC-SCNC: 11 MMOL/L
ANISOCYTOSIS BLD QL SMEAR: SLIGHT
AST SERPL-CCNC: 47 U/L
BASOPHILS NFR BLD: 0 %
BILIRUB SERPL-MCNC: 0.8 MG/DL
BUN SERPL-MCNC: 18 MG/DL
CALCIUM SERPL-MCNC: 9.9 MG/DL
CHLORIDE SERPL-SCNC: 104 MMOL/L
CO2 SERPL-SCNC: 25 MMOL/L
CREAT SERPL-MCNC: 1.5 MG/DL
DIFFERENTIAL METHOD: ABNORMAL
EOSINOPHIL NFR BLD: 0 %
ERYTHROCYTE [DISTWIDTH] IN BLOOD BY AUTOMATED COUNT: 16.9 %
EST. GFR  (AFRICAN AMERICAN): 38.5 ML/MIN/1.73 M^2
EST. GFR  (NON AFRICAN AMERICAN): 33.4 ML/MIN/1.73 M^2
GLUCOSE SERPL-MCNC: 169 MG/DL
HCT VFR BLD AUTO: 29.5 %
HGB BLD-MCNC: 9.6 G/DL
HYPOCHROMIA BLD QL SMEAR: ABNORMAL
IMM GRANULOCYTES # BLD AUTO: ABNORMAL K/UL
IMM GRANULOCYTES NFR BLD AUTO: ABNORMAL %
LDH SERPL L TO P-CCNC: 1439 U/L
LYMPHOCYTES NFR BLD: 10 %
MCH RBC QN AUTO: 31.7 PG
MCHC RBC AUTO-ENTMCNC: 32.5 G/DL
MCV RBC AUTO: 97 FL
MONOCYTES NFR BLD: 8 %
MYELOCYTES NFR BLD MANUAL: 1 %
NEUTROPHILS NFR BLD: 71 %
NEUTS BAND NFR BLD MANUAL: 10 %
NRBC BLD-RTO: 5 /100 WBC
OVALOCYTES BLD QL SMEAR: ABNORMAL
PLATELET # BLD AUTO: 142 K/UL
PLATELET BLD QL SMEAR: ABNORMAL
PMV BLD AUTO: 11.9 FL
POIKILOCYTOSIS BLD QL SMEAR: SLIGHT
POLYCHROMASIA BLD QL SMEAR: ABNORMAL
POTASSIUM SERPL-SCNC: 4.1 MMOL/L
PROT SERPL-MCNC: 9.2 G/DL
RBC # BLD AUTO: 3.03 M/UL
SODIUM SERPL-SCNC: 140 MMOL/L
URATE SERPL-MCNC: 9.2 MG/DL
WBC # BLD AUTO: 71.54 K/UL

## 2018-01-15 PROCEDURE — 36415 COLL VENOUS BLD VENIPUNCTURE: CPT

## 2018-01-15 PROCEDURE — 99214 OFFICE O/P EST MOD 30 MIN: CPT | Mod: S$PBB,,, | Performed by: INTERNAL MEDICINE

## 2018-01-15 PROCEDURE — 85007 BL SMEAR W/DIFF WBC COUNT: CPT

## 2018-01-15 PROCEDURE — 99214 OFFICE O/P EST MOD 30 MIN: CPT | Mod: PBBFAC | Performed by: INTERNAL MEDICINE

## 2018-01-15 PROCEDURE — 83615 LACTATE (LD) (LDH) ENZYME: CPT

## 2018-01-15 PROCEDURE — 80053 COMPREHEN METABOLIC PANEL: CPT

## 2018-01-15 PROCEDURE — 84550 ASSAY OF BLOOD/URIC ACID: CPT

## 2018-01-15 PROCEDURE — 99999 PR PBB SHADOW E&M-EST. PATIENT-LVL IV: CPT | Mod: PBBFAC,,, | Performed by: INTERNAL MEDICINE

## 2018-01-15 PROCEDURE — 85060 BLOOD SMEAR INTERPRETATION: CPT | Mod: ,,, | Performed by: PATHOLOGY

## 2018-01-15 PROCEDURE — 85027 COMPLETE CBC AUTOMATED: CPT

## 2018-01-15 RX ORDER — ALLOPURINOL 100 MG/1
100 TABLET ORAL DAILY
Qty: 30 TABLET | Refills: 2 | Status: ON HOLD | OUTPATIENT
Start: 2018-01-15 | End: 2018-02-22 | Stop reason: HOSPADM

## 2018-01-15 RX ORDER — HYDROXYUREA 500 MG/1
1000 CAPSULE ORAL 2 TIMES DAILY
Qty: 30 CAPSULE | Refills: 0 | Status: SHIPPED | OUTPATIENT
Start: 2018-01-15 | End: 2018-02-08 | Stop reason: SDUPTHER

## 2018-01-15 RX ORDER — DOXYCYCLINE 100 MG/1
100 CAPSULE ORAL
Refills: 0 | Status: ON HOLD | COMMUNITY
Start: 2018-01-09 | End: 2018-02-21

## 2018-01-15 RX ORDER — ALBUTEROL SULFATE 0.83 MG/ML
2.5 SOLUTION RESPIRATORY (INHALATION)
Refills: 11 | Status: ON HOLD | COMMUNITY
Start: 2018-01-09 | End: 2018-02-21

## 2018-01-15 NOTE — TELEPHONE ENCOUNTER
----- Message from Jeevan Gonzales sent at 1/15/2018  3:39 PM CST -----  Hello, can you please give Mrs. Hammer a call back she has questions on how to take her Medication. Thanks

## 2018-01-15 NOTE — PHYSICIAN QUERY
PT Name: Courtney Hammer  MR #: [id number]     Physician Query Form - Diagnosis Clarification      CDS/: Jerrell Vazquez RN               Contact information:   [email or web address]    This form is a permanent document in the medical record.     Query Date: January 15, 2018    By submitting this query, we are merely seeking further clarification of documentation.  Please utilize your independent clinical judgment when addressing the question(s) below.     The medical record contains the following:      Findings Supporting Clinical Information Location in Medical Record   pneumonia The patient presented to [facility] on 1/1/2018 with a primary complaint of Pneumonia (Sent from [facility] ER with diagnosis of pneumonia.     Right sided PNA---repeat chest xray here not impressive.  Will continue abx with azithromycin/ceftriaxone.  Flu negative.  Supportive care....    1. No airspace opacities suggest lobar pneumonia.     1.Pneumonia due to infectious organism, unspecified laterality, unspecified part of lung        H&P  1/2        H&P  1/2        CXR 1/1    ED provider 1/1       Please clarify if the ____pneumonia_________ diagnosis has been:    [  ] Ruled In  [  ] Ruled In, Now Resolved  [x  ] Ruled Out  [  ] Clinically undetermined  [  ] Other/Clarification of findings (please specify)_______________________________    Please document in your progress notes daily for the duration of treatment, until resolved, and include in your discharge summary.

## 2018-01-15 NOTE — PHYSICIAN QUERY
PT Name: Courtney Hammer  MR #: [id number]     Physician Query Form - Diagnosis Clarification      CDS/: Jerrell Vazquez RN             Contact information:   [email or web address]    This form is a permanent document in the medical record.     Query Date: January 15, 2018    By submitting this query, we are merely seeking further clarification of documentation.  Please utilize your independent clinical judgment when addressing the question(s) below.     The medical record contains the following:      Findings Supporting Clinical Information Location in Medical Record    Bronchitis She saw her pcp and was stated on abx for bronchitis with no improvement.  She presented to the ED for treatment.    ... Her current infection looks more consistent with viral bronchitis. ...    CXR was done that showed no consolidative changes.  The patient however was given a brief course of Azithromycin and Ceftriaxone and tolerated this well.  The patient is suspected to have an acute viral syndrome causing an upper respiratory infection.        H&P  1/2      Hematology 1/2      DC summary 1/3       Please clarify if the ______Bronchitis____________ diagnosis has been:    [  ] Ruled In, POA  [x  ] Ruled In, Now Resolved  [  ] Ruled Out  [  ] Clinically undetermined  [  ] Other/Clarification of findings (please specify)_______________________________    Please document in your progress notes daily for the duration of treatment, until resolved, and include in your discharge summary.

## 2018-01-15 NOTE — TELEPHONE ENCOUNTER
Spoke with patient's .  Explained to him how patient is to take hydrea and jakafi.   voiced understanding.

## 2018-01-16 LAB — PATH REV BLD -IMP: NORMAL

## 2018-01-18 ENCOUNTER — LAB VISIT (OUTPATIENT)
Dept: LAB | Facility: HOSPITAL | Age: 78
End: 2018-01-18
Attending: INTERNAL MEDICINE
Payer: MEDICARE

## 2018-01-18 ENCOUNTER — DOCUMENTATION ONLY (OUTPATIENT)
Dept: HEMATOLOGY/ONCOLOGY | Facility: CLINIC | Age: 78
End: 2018-01-18

## 2018-01-18 DIAGNOSIS — D47.3 ESSENTIAL THROMBOCYTHEMIA: ICD-10-CM

## 2018-01-18 LAB
ANISOCYTOSIS BLD QL SMEAR: SLIGHT
BASOPHILS NFR BLD: 0.5 %
BLASTS NFR BLD MANUAL: 3 %
DIFFERENTIAL METHOD: ABNORMAL
EOSINOPHIL NFR BLD: 0 %
ERYTHROCYTE [DISTWIDTH] IN BLOOD BY AUTOMATED COUNT: 16.6 %
GIANT PLATELETS BLD QL SMEAR: PRESENT
HCT VFR BLD AUTO: 30.9 %
HGB BLD-MCNC: 9.9 G/DL
IMM GRANULOCYTES # BLD AUTO: ABNORMAL K/UL
IMM GRANULOCYTES NFR BLD AUTO: ABNORMAL %
LDH SERPL L TO P-CCNC: 1066 U/L
LYMPHOCYTES NFR BLD: 11 %
MCH RBC QN AUTO: 31.2 PG
MCHC RBC AUTO-ENTMCNC: 32 G/DL
MCV RBC AUTO: 98 FL
METAMYELOCYTES NFR BLD MANUAL: 4 %
MONOCYTES NFR BLD: 7.5 %
MYELOCYTES NFR BLD MANUAL: 2 %
NEUTROPHILS NFR BLD: 65 %
NEUTS BAND NFR BLD MANUAL: 6.5 %
NRBC BLD-RTO: 4 /100 WBC
PLATELET # BLD AUTO: 151 K/UL
PLATELET BLD QL SMEAR: ABNORMAL
PMV BLD AUTO: 11.9 FL
POLYCHROMASIA BLD QL SMEAR: ABNORMAL
PROMYELOCYTES NFR BLD MANUAL: 0.5 %
RBC # BLD AUTO: 3.17 M/UL
WBC # BLD AUTO: 66.66 K/UL

## 2018-01-18 PROCEDURE — 80053 COMPREHEN METABOLIC PANEL: CPT

## 2018-01-18 PROCEDURE — 36415 COLL VENOUS BLD VENIPUNCTURE: CPT

## 2018-01-18 PROCEDURE — 83615 LACTATE (LD) (LDH) ENZYME: CPT

## 2018-01-18 PROCEDURE — 84550 ASSAY OF BLOOD/URIC ACID: CPT

## 2018-01-18 PROCEDURE — 85027 COMPLETE CBC AUTOMATED: CPT

## 2018-01-18 PROCEDURE — 85007 BL SMEAR W/DIFF WBC COUNT: CPT

## 2018-01-19 LAB
ALBUMIN SERPL BCP-MCNC: 3.8 G/DL
ALP SERPL-CCNC: 105 U/L
ALT SERPL W/O P-5'-P-CCNC: 25 U/L
ANION GAP SERPL CALC-SCNC: 14 MMOL/L
AST SERPL-CCNC: 54 U/L
BILIRUB SERPL-MCNC: 0.7 MG/DL
BUN SERPL-MCNC: 18 MG/DL
CALCIUM SERPL-MCNC: 10 MG/DL
CHLORIDE SERPL-SCNC: 107 MMOL/L
CO2 SERPL-SCNC: 20 MMOL/L
CREAT SERPL-MCNC: 1.4 MG/DL
EST. GFR  (AFRICAN AMERICAN): 41.8 ML/MIN/1.73 M^2
EST. GFR  (NON AFRICAN AMERICAN): 36.3 ML/MIN/1.73 M^2
GLUCOSE SERPL-MCNC: 63 MG/DL
POTASSIUM SERPL-SCNC: 3.8 MMOL/L
PROT SERPL-MCNC: 9.9 G/DL
SODIUM SERPL-SCNC: 141 MMOL/L
URATE SERPL-MCNC: 6.8 MG/DL

## 2018-01-22 ENCOUNTER — DOCUMENTATION ONLY (OUTPATIENT)
Dept: HEMATOLOGY/ONCOLOGY | Facility: CLINIC | Age: 78
End: 2018-01-22

## 2018-01-23 ENCOUNTER — TELEPHONE (OUTPATIENT)
Dept: PHARMACY | Facility: CLINIC | Age: 78
End: 2018-01-23

## 2018-01-24 NOTE — PROGRESS NOTES
CC: MPN, follow up     HPI: Mrs. 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 Her weakness has improved and both her platelet and WBC have risen.  Too early to say whether there is a trend to rise for her hemoglobin on its own.   She had not required a blood transfusion since 3/2016 but she was transfused 3 U PRBC on 10/9/17-11/20/17.  None since then.     No fever or other new problems.  She is active despite her anemia.  Diabetes now controlled.     History:  She was first noted to have an elevated platelet count in early 2011 with CBC 6/7/11 showing WBC 56.9K, Hb 12.0, Plt 1090K (74P, 21L).  On 7/8/11 she had a marrow biopsy showing 90% cellularity with megakaryocyte hyperplasia with dysmorphic features. Myeloid hyperplasia with left shift. No lymphoid infiltrate or increased blasts. Flow cytometry showed virtually no B cells but no other concerning features. [employer] 46,XY[20]. Bcr-abl negative. Testing for Jak2 V617F on 7/7/15 was positive in 5% of total Jak2 DNA. Additional testing that same day for CalR and Mpl exon 10 mutation were both negative. Hence, she was diagnosed with Jak2+ ET.     She was started on hydroxyurea and ASA for essential thrombocythemia and had been stable on this until she began to require blood transfusions early in 2015. Her RBC transfusion needs slowly increased. She was continued on 1500 mg hydroxyurea, her same dose for many years. This has been decreased but her transfusion needs continue along with an alarming increase in her WBC.     CBC 6/18/15 WBC 31.8K, hemoglobin 4.4, platelets 212K. She received 4U PRBC and CBC on 6/30 WBC 23K, hemoglobin 10.3, platelets 97K.  A repeat marrow on 1/26/16 showed a 100% cellular marrow with increased megakaryocytes and storage iron and only occasional blasts on CD34 staining (morphologic blast count 1%).  Jak2 V617F mutation was again detected  without CALR or MPL exon 10 mutations.     Hence, she was started on Ruxolitinib 20 mg bid 2/2015.  This was cut to 10 mg bid as of 10/9/17. She was hospitalized for pneumonia just before new year ( Dec 2017).     Interval History: She is here for a follow up visit. She is now on Jakafi.     Review of Systems   Constitutional: Positive for malaise/fatigue. Negative for fever and weight loss.   HENT: Negative for congestion and nosebleeds.    Eyes: Negative for blurred vision and double vision.   Respiratory: Negative for cough and hemoptysis.    Cardiovascular: Negative for chest pain, palpitations and claudication.   Gastrointestinal: Negative for heartburn and nausea.   Genitourinary: Negative for dysuria and urgency.   Musculoskeletal: Negative for myalgias.   Skin: Negative for rash.   Neurological: Negative for dizziness, sensory change and speech change.   Endo/Heme/Allergies: Does not bruise/bleed easily.   Psychiatric/Behavioral: Negative for depression.       Current Outpatient Prescriptions   Medication Sig    albuterol (PROVENTIL) 2.5 mg /3 mL (0.083 %) nebulizer solution Take 2.5 mg by nebulization.    allopurinol (ZYLOPRIM) 100 MG tablet Take 1 tablet (100 mg total) by mouth once daily.    benzonatate (TESSALON) 200 MG capsule TK ONE C PO  Q EIGHT H PRN COUGH    calcium carbonate-vitamin D3 (CALCIUM 600 + D,3,) 600 mg calcium- 200 unit Cap Take by mouth once daily.    doxycycline (VIBRAMYCIN) 100 MG Cap Take 100 mg by mouth.    EPIGALLOCATECHIN GALLATE (GREEN TEA EXTRACT MISC) by Misc.(Non-Drug; Combo Route) route.    fluticasone (FLONASE) 50 mcg/actuation nasal spray 1 spray by Each Nare route once daily.    FLUZONE HIGH-DOSE 2017-18, PF, 180 mcg/0.5 mL vaccine     FOLIC ACID-B LUPI-X-ZYIRY-ZINC 3-70-15 MG-MCG-MG ORAL TAB Take by mouth.    guaiFENesin (MUCINEX) 1,200 mg Ta12 Take 2 tablets every 12 hours    hydroxyurea (HYDREA) 500 mg Cap Take 2 capsules (1,000 mg total) by mouth 2 (two)  times daily.    latanoprost 0.005 % ophthalmic solution     megestrol (MEGACE) 400 mg/10 mL (40 mg/mL) Susp     meloxicam (MOBIC) 15 MG tablet Take 15 mg by mouth once daily.    NIACIN ORAL Take by mouth 2 (two) times daily.    oxybutynin (DITROPAN) 5 MG Tab TK 1 T PO  BID.    pravastatin (PRAVACHOL) 20 MG tablet Take 20 mg by mouth once daily.    promethazine (PHENERGAN) 6.25 mg/5 mL syrup TK 10 ML PO Q 8 HOURS PRF COUGH    TRADJENTA 5 mg Tab tablet TK 1 T PO QD    walker Misc Rolling walker     No current facility-administered medications for this visit.      Vitals:    01/25/18 1321   BP: (!) 148/67   Pulse: 89   Resp: 16   Temp: 97.7 °F (36.5 °C)         Physical Exam   Constitutional: She is oriented to person, place, and time. She appears well-developed.   HENT:   Head: Normocephalic and atraumatic.   Mouth/Throat: No oropharyngeal exudate.   Eyes: Pupils are equal, round, and reactive to light. No scleral icterus.   Cardiovascular: Normal rate.  Exam reveals no friction rub.    No murmur heard.  Has irregular rhythm   Pulmonary/Chest: Effort normal and breath sounds normal. No respiratory distress. She has no wheezes. She has no rales.   Abdominal: Soft. She exhibits no distension. There is no tenderness.   Musculoskeletal: She exhibits no edema.   Lymphadenopathy:     She has no cervical adenopathy.   Neurological: She is alert and oriented to person, place, and time. No cranial nerve deficit.   Skin: Skin is warm.   Psychiatric: She has a normal mood and affect.     Component      Latest Ref Rng & Units 1/25/2018   Sodium      136 - 145 mmol/L 141   Potassium      3.5 - 5.1 mmol/L 3.9   Chloride      95 - 110 mmol/L 110   CO2      23 - 29 mmol/L 25   Glucose      70 - 110 mg/dL 89   BUN, Bld      8 - 23 mg/dL 16   Creatinine      0.5 - 1.4 mg/dL 1.7 (H)   Calcium      8.7 - 10.5 mg/dL 9.4   Total Protein      6.0 - 8.4 g/dL 8.9 (H)   Albumin      3.5 - 5.2 g/dL 3.7   Total Bilirubin      0.1 - 1.0  mg/dL 0.6   Alkaline Phosphatase      55 - 135 U/L 88   AST      10 - 40 U/L 45 (H)   ALT      10 - 44 U/L 23   Anion Gap      8 - 16 mmol/L 6 (L)   eGFR if African American      >60 mL/min/1.73 m:2 33.1 (A)   eGFR if non African American      >60 mL/min/1.73 m:2 28.7 (A)   WBC      3.90 - 12.70 K/uL 54.61 (HH)   RBC      4.00 - 5.40 M/uL 2.84 (L)   Hemoglobin      12.0 - 16.0 g/dL 8.9 (L)   Hematocrit      37.0 - 48.5 % 27.4 (L)   MCV      82 - 98 fL 97   MCH      27.0 - 31.0 pg 31.3 (H)   MCHC      32.0 - 36.0 g/dL 32.5   RDW      11.5 - 14.5 % 17.1 (H)   Platelets      150 - 350 K/uL 109 (L)   MPV      9.2 - 12.9 fL 12.5   LD      110 - 260 U/L 761 (H)   Uric Acid      2.4 - 5.7 mg/dL 6.2 (H)       Assessment:     1.Ms. 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 A repeat marrow on 1/26/16 showed a 100% cellular marrow with increased megakaryocytes and storage iron and only occasional blasts on CD34 staining (morphologic blast count 1%).  Jak2 V617F mutation was again detected without CalR or Mpl exon 10 mutations.     Hence, she was started on Ruxolitinib 20 mg bid 2/2016.  Her WBC had been controlled since that time but all of her counts fell significantly but 10/9/17 with WBC 16K, hemoglobin 5 and platelets 81K.  Hence, she received 2U RBC ruxolitinib was decreased to 10 mg BID with improvement in her counts and acceptable WBC. Ruxolitinib was held around new year 2018 after she was hospitalized at [facility] for likely pneumonia. She has still not started the medication. She believes she never got the bottle of her Jakafi back from [facility] ( she had supplied her own medication while there) after her discharge. I called [facility] in-patient pharmacy who directed the call to the floor ( 3 S). The medication could not be traced.  Her WBC count was 71.54k on 1/15.Differential count showed predominantly granulocytes, bands. No  blasts. Hydrea 1g BID was prescribed.   She is now back on Jakafi(from 1/16/18). WBC 54.6 k today.      2. 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 She is on Allopurinol 100mg daily. Uric acid 6.2 today.     3. Thrombocytopenia: Mild, asymptomatic.       4. Anemia: She has normocytic, hyperchromic anemia.  Her anemia is chronic.       5. Hyperglycemia: Blood glucose is 89 today. Her hyperglycemia has improved and there is a family history of late onset diabetes. Dr. Pacheco, her primary care attending, will continue to follow up on her diabetes.    6. FELICE: Serum creatinine 1.7 today. No clear evidence of tumor lysis. Will follow electrolytes, renal function twice weekly.      Plan:  1. Continue Jakafi  2. CBC, CMP, LDH, uric acid bi-weekly  3. Await Jakafi refill  4. Allopurinol 100mg daily    Distress Screening Results: Psychosocial Distress screening score of Distress Score: 0 noted and reviewed. No intervention indicated.

## 2018-01-25 ENCOUNTER — DOCUMENTATION ONLY (OUTPATIENT)
Dept: HEMATOLOGY/ONCOLOGY | Facility: CLINIC | Age: 78
End: 2018-01-25

## 2018-01-25 ENCOUNTER — OFFICE VISIT (OUTPATIENT)
Dept: HEMATOLOGY/ONCOLOGY | Facility: CLINIC | Age: 78
End: 2018-01-25
Payer: MEDICARE

## 2018-01-25 VITALS
HEIGHT: 63 IN | BODY MASS INDEX: 18.08 KG/M2 | OXYGEN SATURATION: 99 % | TEMPERATURE: 98 F | WEIGHT: 102.06 LBS | RESPIRATION RATE: 16 BRPM | DIASTOLIC BLOOD PRESSURE: 67 MMHG | SYSTOLIC BLOOD PRESSURE: 148 MMHG | HEART RATE: 89 BPM

## 2018-01-25 DIAGNOSIS — D72.828 OTHER ELEVATED WHITE BLOOD CELL (WBC) COUNT: ICD-10-CM

## 2018-01-25 DIAGNOSIS — D64.81 ANEMIA DUE TO ANTINEOPLASTIC CHEMOTHERAPY: ICD-10-CM

## 2018-01-25 DIAGNOSIS — N17.9 AKI (ACUTE KIDNEY INJURY): ICD-10-CM

## 2018-01-25 DIAGNOSIS — D47.3 ESSENTIAL THROMBOCYTHEMIA: Primary | ICD-10-CM

## 2018-01-25 DIAGNOSIS — E79.0 HYPERURICEMIA: ICD-10-CM

## 2018-01-25 DIAGNOSIS — T45.1X5A ANEMIA DUE TO ANTINEOPLASTIC CHEMOTHERAPY: ICD-10-CM

## 2018-01-25 PROCEDURE — 99999 PR PBB SHADOW E&M-EST. PATIENT-LVL IV: CPT | Mod: PBBFAC,,, | Performed by: INTERNAL MEDICINE

## 2018-01-25 PROCEDURE — 99214 OFFICE O/P EST MOD 30 MIN: CPT | Mod: S$PBB,,, | Performed by: INTERNAL MEDICINE

## 2018-01-25 PROCEDURE — 99214 OFFICE O/P EST MOD 30 MIN: CPT | Mod: PBBFAC | Performed by: INTERNAL MEDICINE

## 2018-02-08 ENCOUNTER — LAB VISIT (OUTPATIENT)
Dept: LAB | Facility: HOSPITAL | Age: 78
End: 2018-02-08
Attending: INTERNAL MEDICINE
Payer: MEDICARE

## 2018-02-08 ENCOUNTER — OFFICE VISIT (OUTPATIENT)
Dept: HEMATOLOGY/ONCOLOGY | Facility: CLINIC | Age: 78
End: 2018-02-08
Payer: MEDICARE

## 2018-02-08 ENCOUNTER — TELEPHONE (OUTPATIENT)
Dept: HEMATOLOGY/ONCOLOGY | Facility: CLINIC | Age: 78
End: 2018-02-08

## 2018-02-08 ENCOUNTER — TELEPHONE (OUTPATIENT)
Dept: PHARMACY | Facility: CLINIC | Age: 78
End: 2018-02-08

## 2018-02-08 VITALS
WEIGHT: 102.31 LBS | OXYGEN SATURATION: 100 % | TEMPERATURE: 99 F | HEART RATE: 101 BPM | SYSTOLIC BLOOD PRESSURE: 134 MMHG | DIASTOLIC BLOOD PRESSURE: 64 MMHG | HEIGHT: 63 IN | RESPIRATION RATE: 18 BRPM | BODY MASS INDEX: 18.13 KG/M2

## 2018-02-08 DIAGNOSIS — T45.1X5A ANEMIA DUE TO ANTINEOPLASTIC CHEMOTHERAPY: Primary | ICD-10-CM

## 2018-02-08 DIAGNOSIS — D47.3 ESSENTIAL THROMBOCYTHEMIA: ICD-10-CM

## 2018-02-08 DIAGNOSIS — E79.0 HYPERURICEMIA: ICD-10-CM

## 2018-02-08 DIAGNOSIS — D47.1 MYELOPROLIFERATIVE NEOPLASM: ICD-10-CM

## 2018-02-08 DIAGNOSIS — D64.81 ANEMIA DUE TO ANTINEOPLASTIC CHEMOTHERAPY: Primary | ICD-10-CM

## 2018-02-08 DIAGNOSIS — D72.828 OTHER ELEVATED WHITE BLOOD CELL (WBC) COUNT: ICD-10-CM

## 2018-02-08 LAB
ALBUMIN SERPL BCP-MCNC: 3.6 G/DL
ALP SERPL-CCNC: 98 U/L
ALT SERPL W/O P-5'-P-CCNC: 35 U/L
ANION GAP SERPL CALC-SCNC: 6 MMOL/L
ANISOCYTOSIS BLD QL SMEAR: ABNORMAL
AST SERPL-CCNC: 72 U/L
BASOPHILS # BLD AUTO: ABNORMAL K/UL
BASOPHILS NFR BLD: 0.5 %
BILIRUB SERPL-MCNC: 0.8 MG/DL
BUN SERPL-MCNC: 17 MG/DL
CALCIUM SERPL-MCNC: 9.5 MG/DL
CHLORIDE SERPL-SCNC: 106 MMOL/L
CO2 SERPL-SCNC: 28 MMOL/L
CREAT SERPL-MCNC: 1.3 MG/DL
DIFFERENTIAL METHOD: ABNORMAL
EOSINOPHIL # BLD AUTO: ABNORMAL K/UL
EOSINOPHIL NFR BLD: 0.5 %
ERYTHROCYTE [DISTWIDTH] IN BLOOD BY AUTOMATED COUNT: 17.3 %
EST. GFR  (AFRICAN AMERICAN): 45.7 ML/MIN/1.73 M^2
EST. GFR  (NON AFRICAN AMERICAN): 39.7 ML/MIN/1.73 M^2
GIANT PLATELETS BLD QL SMEAR: PRESENT
GLUCOSE SERPL-MCNC: 71 MG/DL
HCT VFR BLD AUTO: 24.3 %
HGB BLD-MCNC: 7.8 G/DL
HYPOCHROMIA BLD QL SMEAR: ABNORMAL
IMM GRANULOCYTES # BLD AUTO: ABNORMAL K/UL
IMM GRANULOCYTES NFR BLD AUTO: ABNORMAL %
LDH SERPL L TO P-CCNC: 1171 U/L
LYMPHOCYTES # BLD AUTO: ABNORMAL K/UL
LYMPHOCYTES NFR BLD: 11 %
MCH RBC QN AUTO: 31 PG
MCHC RBC AUTO-ENTMCNC: 32.1 G/DL
MCV RBC AUTO: 96 FL
MEGAKARYOCYTIC FRAGMENTS: PRESENT
METAMYELOCYTES NFR BLD MANUAL: 6.5 %
MONOCYTES # BLD AUTO: ABNORMAL K/UL
MONOCYTES NFR BLD: 2 %
MYELOCYTES NFR BLD MANUAL: 2.5 %
NEUTROPHILS NFR BLD: 66 %
NEUTS BAND NFR BLD MANUAL: 9 %
NRBC BLD-RTO: 6 /100 WBC
PLATELET # BLD AUTO: 120 K/UL
PLATELET BLD QL SMEAR: ABNORMAL
PMV BLD AUTO: 12.7 FL
POLYCHROMASIA BLD QL SMEAR: ABNORMAL
POTASSIUM SERPL-SCNC: 3.8 MMOL/L
PROMYELOCYTES NFR BLD MANUAL: 1 %
PROT SERPL-MCNC: 8.7 G/DL
RBC # BLD AUTO: 2.52 M/UL
SODIUM SERPL-SCNC: 140 MMOL/L
URATE SERPL-MCNC: 5.6 MG/DL
WBC # BLD AUTO: 70.48 K/UL
WBC OTHER NFR BLD MANUAL: 1 %

## 2018-02-08 PROCEDURE — 80053 COMPREHEN METABOLIC PANEL: CPT

## 2018-02-08 PROCEDURE — 36415 COLL VENOUS BLD VENIPUNCTURE: CPT

## 2018-02-08 PROCEDURE — 1126F AMNT PAIN NOTED NONE PRSNT: CPT | Mod: ,,, | Performed by: INTERNAL MEDICINE

## 2018-02-08 PROCEDURE — 84550 ASSAY OF BLOOD/URIC ACID: CPT

## 2018-02-08 PROCEDURE — 99999 PR PBB SHADOW E&M-EST. PATIENT-LVL IV: CPT | Mod: PBBFAC,,, | Performed by: INTERNAL MEDICINE

## 2018-02-08 PROCEDURE — 1159F MED LIST DOCD IN RCRD: CPT | Mod: ,,, | Performed by: INTERNAL MEDICINE

## 2018-02-08 PROCEDURE — 83615 LACTATE (LD) (LDH) ENZYME: CPT

## 2018-02-08 PROCEDURE — 85027 COMPLETE CBC AUTOMATED: CPT

## 2018-02-08 PROCEDURE — 99214 OFFICE O/P EST MOD 30 MIN: CPT | Mod: PBBFAC | Performed by: INTERNAL MEDICINE

## 2018-02-08 PROCEDURE — 85007 BL SMEAR W/DIFF WBC COUNT: CPT

## 2018-02-08 PROCEDURE — 99214 OFFICE O/P EST MOD 30 MIN: CPT | Mod: S$PBB,,, | Performed by: INTERNAL MEDICINE

## 2018-02-08 RX ORDER — PROMETHAZINE HYDROCHLORIDE AND CODEINE PHOSPHATE 6.25; 1 MG/5ML; MG/5ML
SOLUTION ORAL
Status: ON HOLD | COMMUNITY
Start: 2018-01-09 | End: 2018-02-21

## 2018-02-08 RX ORDER — AZITHROMYCIN 250 MG/1
TABLET, FILM COATED ORAL
COMMUNITY
Start: 2017-12-29 | End: 2018-02-08

## 2018-02-08 RX ORDER — RUXOLITINIB 10 MG/1
TABLET ORAL
Status: ON HOLD | COMMUNITY
Start: 2018-01-15 | End: 2018-02-21 | Stop reason: CLARIF

## 2018-02-08 RX ORDER — HYDROXYUREA 500 MG/1
1000 CAPSULE ORAL 2 TIMES DAILY
Qty: 120 CAPSULE | Refills: 0 | Status: ON HOLD | OUTPATIENT
Start: 2018-02-08 | End: 2018-02-22 | Stop reason: HOSPADM

## 2018-02-08 NOTE — Clinical Note
Labs today Pt will come back to lobby; might need admission depending on labs Otherwise needs 4 wk f/u

## 2018-02-08 NOTE — TELEPHONE ENCOUNTER
[facility] for follow up and refill for Jakafi on 02/08/18, Co-pay $25.00

## 2018-02-08 NOTE — PROGRESS NOTES
CC: MPN, follow up     HPI: Mrs. 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 Her weakness has improved and both her platelet and WBC have risen.  Too early to say whether there is a trend to rise for her hemoglobin on its own.   She had not required a blood transfusion since 3/2016 but she was transfused 3 U PRBC on 10/9/17-11/20/17.  None since then.     No fever or other new problems.  She is active despite her anemia.  Diabetes now controlled.     History:  She was first noted to have an elevated platelet count in early 2011 with CBC 6/7/11 showing WBC 56.9K, Hb 12.0, Plt 1090K (74P, 21L).  On 7/8/11 she had a marrow biopsy showing 90% cellularity with megakaryocyte hyperplasia with dysmorphic features. Myeloid hyperplasia with left shift. No lymphoid infiltrate or increased blasts. Flow cytometry showed virtually no B cells but no other concerning features. Cytogentics 46,XY[20]. Bcr-abl negative. Testing for Jak2 V617F on 7/7/15 was positive in 5% of total Jak2 DNA. Additional testing that same day for CalR and Mpl exon 10 mutation were both negative. Hence, she was diagnosed with Jak2+ ET.     She was started on hydroxyurea and ASA for essential thrombocythemia and had been stable on this until she began to require blood transfusions early in 2015. Her RBC transfusion needs slowly increased. She was continued on 1500 mg hydroxyurea, her same dose for many years. Her transfusion needs increased along with an alarming increase in her WBC.     CBC 6/18/15 WBC 31.8K, hemoglobin 4.4, platelets 212K. She received 4U PRBC and CBC on 6/30 WBC 23K, hemoglobin 10.3, platelets 97K.  A repeat marrow on 1/26/16 showed a 100% cellular marrow with increased megakaryocytes and storage iron and only occasional blasts on CD34 staining (morphologic blast count 1%).  Jak2 V617F mutation was again detected without CALR or MPL exon 10  mutations.     Hence, she was started on Ruxolitinib 20 mg bid 2/2015.  This was cut to 10 mg bid as of 10/9/17. She was hospitalized for pneumonia just before new year ( Dec 2017).      Interval History: She is here for a follow up visit. She is now on Jakafi. Her WBC has again trended up.    Review of Systems   Constitutional: Positive for malaise/fatigue. Negative for fever and weight loss.   HENT: Negative for congestion and nosebleeds.    Eyes: Negative for blurred vision and double vision.   Respiratory: Positive for cough. Negative for hemoptysis, sputum production and shortness of breath.    Cardiovascular: Negative for chest pain, palpitations, claudication and leg swelling.   Gastrointestinal: Negative for abdominal pain, blood in stool, constipation, diarrhea, heartburn, nausea and vomiting.   Genitourinary: Negative for dysuria, frequency and urgency.   Musculoskeletal: Negative for myalgias.   Skin: Negative for rash.   Neurological: Negative for dizziness, tremors, weakness and headaches.   Endo/Heme/Allergies: Does not bruise/bleed easily.   Psychiatric/Behavioral: Negative for depression.       Current Outpatient Prescriptions   Medication Sig    albuterol (PROVENTIL) 2.5 mg /3 mL (0.083 %) nebulizer solution Take 2.5 mg by nebulization.    allopurinol (ZYLOPRIM) 100 MG tablet Take 1 tablet (100 mg total) by mouth once daily.    calcium carbonate-vitamin D3 (CALCIUM 600 + D,3,) 600 mg calcium- 200 unit Cap Take by mouth once daily.    doxycycline (VIBRAMYCIN) 100 MG Cap Take 100 mg by mouth.    EPIGALLOCATECHIN GALLATE (GREEN TEA EXTRACT MISC) by Misc.(Non-Drug; Combo Route) route.    fluticasone (FLONASE) 50 mcg/actuation nasal spray 1 spray by Each Nare route once daily.    FLUZONE HIGH-DOSE 2017-18, PF, 180 mcg/0.5 mL vaccine     FOLIC ACID-B LFXY-H-HEOLY-ZINC 3-70-15 MG-MCG-MG ORAL TAB Take by mouth.    guaiFENesin (MUCINEX) 1,200 mg Ta12 Take 2 tablets every 12 hours    hydroxyurea  (HYDREA) 500 mg Cap Take 2 capsules (1,000 mg total) by mouth 2 (two) times daily.    JAKAFI 10 mg Tab     latanoprost 0.005 % ophthalmic solution     megestrol (MEGACE) 400 mg/10 mL (40 mg/mL) Susp     meloxicam (MOBIC) 15 MG tablet Take 15 mg by mouth once daily.    NIACIN ORAL Take by mouth 2 (two) times daily.    oxybutynin (DITROPAN) 5 MG Tab TK 1 T PO  BID.    pravastatin (PRAVACHOL) 20 MG tablet Take 20 mg by mouth once daily.    promethazine (PHENERGAN) 6.25 mg/5 mL syrup TK 10 ML PO Q 8 HOURS PRF COUGH    promethazine-codeine 6.25-10 mg/5 ml (PHENERGAN WITH CODEINE) 6.25-10 mg/5 mL syrup     TRADJENTA 5 mg Tab tablet TK 1 T PO QD    walker Misc Rolling walker     No current facility-administered medications for this visit.          Vitals:    02/08/18 1101   BP: 134/64   Pulse: 101   Resp: 18   Temp: 99 °F (37.2 °C)       Physical Exam   Constitutional: She is oriented to person, place, and time. She appears well-developed. No distress.   HENT:   Head: Normocephalic and atraumatic.   Mouth/Throat: No oropharyngeal exudate.   Eyes: Pupils are equal, round, and reactive to light. No scleral icterus.   Neck: Normal range of motion.   Cardiovascular:   Murmur heard.  Has tachycardia and irregular rhythm   Pulmonary/Chest: Effort normal and breath sounds normal. No respiratory distress. She has no wheezes. She has no rales.   Abdominal: Soft. She exhibits distension. She exhibits no mass. There is no tenderness. There is no rebound and no guarding.   Musculoskeletal: She exhibits no edema.   Lymphadenopathy:     She has no cervical adenopathy.   Neurological: She is alert and oriented to person, place, and time. No cranial nerve deficit.   Skin: Skin is warm. No erythema.   Psychiatric: She has a normal mood and affect.     Component      Latest Ref Rng & Units 2/8/2018   Sodium      136 - 145 mmol/L 140   Potassium      3.5 - 5.1 mmol/L 3.8   Chloride      95 - 110 mmol/L 106   CO2      23 - 29  mmol/L 28   Glucose      70 - 110 mg/dL 71   BUN, Bld      8 - 23 mg/dL 17   Creatinine      0.5 - 1.4 mg/dL 1.3   Calcium      8.7 - 10.5 mg/dL 9.5   Total Protein      6.0 - 8.4 g/dL 8.7 (H)   Albumin      3.5 - 5.2 g/dL 3.6   Total Bilirubin      0.1 - 1.0 mg/dL 0.8   Alkaline Phosphatase      55 - 135 U/L 98   AST      10 - 40 U/L 72 (H)   ALT      10 - 44 U/L 35   Anion Gap      8 - 16 mmol/L 6 (L)   eGFR if African American      >60 mL/min/1.73 m:2 45.7 (A)   eGFR if non African American      >60 mL/min/1.73 m:2 39.7 (A)   WBC      3.90 - 12.70 K/uL 70.48 (HH)   RBC      4.00 - 5.40 M/uL 2.52 (L)   Hemoglobin      12.0 - 16.0 g/dL 7.8 (L)   Hematocrit      37.0 - 48.5 % 24.3 (L)   MCV      82 - 98 fL 96   MCH      27.0 - 31.0 pg 31.0   MCHC      32.0 - 36.0 g/dL 32.1   RDW      11.5 - 14.5 % 17.3 (H)   Platelets      150 - 350 K/uL 120 (L)   MPV      9.2 - 12.9 fL 12.7   LD      110 - 260 U/L 1,171 (H)   Uric Acid      2.4 - 5.7 mg/dL 5.6         Assessment:     1.Ms. Hammer has JAK2 V617F myeloproliferative disorder with acceleration and a rapidly rising WBC in late 2015 with falling hemoglobin. A repeat marrow on 1/26/16 showed a 100% cellular marrow with increased megakaryocytes and storage iron and only occasional blasts on CD34 staining (morphologic blast count 1%).  JAK2 V617F mutation was again detected without CALR or Mpl exon 10 mutations.     Hence, she was started on Ruxolitinib 20 mg bid 2/2016.  Her WBC had been controlled since that time but all of her counts fell significantly but 10/9/17 with WBC 16K, hemoglobin 5 and platelets 81K.  Hence, she received 2U RBC; ruxolitinib was decreased to 10 mg BID with improvement in her counts and acceptable WBC. Ruxolitinib was held around new year 2018 after she was hospitalized at [facility] for likely pneumonia. Her WBC count was 71.54k on 1/15.Differential count showed predominantly granulocytes, bands. No blasts. Hydrea 1g BID was prescribed. She  is now back on Jakafi(from 1/16/18). WBC 54.6 k on 1/25/18. WBC count 79.8 on 2/5/18. It is 70.48 today. LDH 1171, uric acid normal, hemoglobin 7.8, plts 120k.  She is asymptomatic. She will stop Jakafi and start Hydra again.  She will have CBC, CMP, LDH, uric acid weekly. She might require repeat bone marrow biopsy.       2. 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 She is on Allopurinol 100mg daily. Uric acid 5.1 on 2/5/18 and 5.6 today.     3. Thrombocytopenia: Mild, asymptomatic.      4. Anemia: She has normocytic, hyperchromic anemia.  Her anemia is chronic.       5. Hyperglycemia: Blood glucose is 89 today. Her hyperglycemia has improved and there is a family history of late onset diabetes. Dr. Pacheco, her primary care attending, will continue to follow up on her diabetes.     6. FELICE: Serum creatinine 1.3 on 2/5/18.  No clear evidence of tumor lysis. Will follow electrolytes, renal function twice weekly.      Plan:    1. Hold Jakafi; Start Hydrea 1g BID  2. CBC, CMP, LDH, uric acid weekly  3. Allopurinol 100mg daily  4. Repeat bone marrow biopsy in 3 weeks  5. F/u in 4 weeks

## 2018-02-10 ENCOUNTER — NURSE TRIAGE (OUTPATIENT)
Dept: ADMINISTRATIVE | Facility: CLINIC | Age: 78
End: 2018-02-10

## 2018-02-10 NOTE — TELEPHONE ENCOUNTER
"Admit 1/1    Reason for Disposition   [1] SEVERE weakness (i.e., unable to walk or barely able to walk, requires support) AND     [2] new onset or worsening    Answer Assessment - Initial Assessment Questions  1. DESCRIPTION: "Describe how you are feeling."     Pt cant communicate on phone for years. Pt mostly in chair. Past couple days getting weak. MD at office visit questioning admitting pt.   2. SEVERITY: "How bad is it?"  "Can you stand and walk?"    - MILD - Feels weak or tired, but does not interfere with work, school or normal activities    - MODERATE - Able to stand and walk; weakness interferes with work, school, or normal activities    - SEVERE - Unable to stand or walk    Pt was sitting on chair earlier - family had to help her up   3. ONSET:  "When did the weakness begin?"    2/10   4. CAUSE: "What do you think is causing the weakness?"    Unsure WBC was 98 down to 50K up to 78K thurs   5. OTHER SYMPTOMS: "Do you have any other symptoms?" (e.g., chest pain, fever, cough, SOB, vomiting, diarrhea, bleeding)    No    Protocols used: ST WEAKNESS (GENERALIZED) AND FATIGUE-A-AH  BS= unsure. HH nurse did not check. Just got pt to eat something. spouse states that he would like to notify MD with new weakness.   Secure chat at 302pm. Per  MD to call pt directly.       "

## 2018-02-15 ENCOUNTER — TELEPHONE (OUTPATIENT)
Dept: HEMATOLOGY/ONCOLOGY | Facility: CLINIC | Age: 78
End: 2018-02-15

## 2018-02-15 DIAGNOSIS — T45.1X5A ANEMIA DUE TO ANTINEOPLASTIC CHEMOTHERAPY: ICD-10-CM

## 2018-02-15 DIAGNOSIS — D64.81 ANEMIA DUE TO ANTINEOPLASTIC CHEMOTHERAPY: ICD-10-CM

## 2018-02-15 DIAGNOSIS — D47.3 ESSENTIAL THROMBOCYTHEMIA: Primary | ICD-10-CM

## 2018-02-20 ENCOUNTER — TELEPHONE (OUTPATIENT)
Dept: HEMATOLOGY/ONCOLOGY | Facility: CLINIC | Age: 78
End: 2018-02-20

## 2018-02-20 NOTE — TELEPHONE ENCOUNTER
Spoke with patient's  regarding concerns of test results. Per Dr. Almeida (that is covering Dr. 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 Patient's  verbalized understanding and will get the patient here.    ---Kinga Reynolds

## 2018-02-20 NOTE — TELEPHONE ENCOUNTER
----- Message from Penelope Veliz sent at 2/20/2018  8:24 AM CST -----  Contact: Pt  Duy   Pt  calling regarding blood test results.        Duy call back number [phone]

## 2018-02-20 NOTE — TELEPHONE ENCOUNTER
----- Message from Penelope Veliz sent at 2/20/2018  2:58 PM CST -----  Contact: Pt   Pt  calling stating that pt is getting very weak. He does not think pt is getting enough oxygen to her brain. He wants to know what he should do. He states that he has been waiting to hear back from office since this morning       [facility] call back number [phone]

## 2018-02-21 ENCOUNTER — HOSPITAL ENCOUNTER (INPATIENT)
Facility: HOSPITAL | Age: 78
LOS: 1 days | Discharge: HOME HEALTH | DRG: 842 | End: 2018-02-22
Attending: EMERGENCY MEDICINE | Admitting: EMERGENCY MEDICINE
Payer: MEDICARE

## 2018-02-21 DIAGNOSIS — D47.1 MYELOPROLIFERATIVE NEOPLASM: ICD-10-CM

## 2018-02-21 DIAGNOSIS — D72.828 OTHER ELEVATED WHITE BLOOD CELL (WBC) COUNT: ICD-10-CM

## 2018-02-21 DIAGNOSIS — R00.0 TACHYCARDIA: ICD-10-CM

## 2018-02-21 DIAGNOSIS — D69.6 THROMBOCYTOPENIA: ICD-10-CM

## 2018-02-21 DIAGNOSIS — D47.1 MYELOPROLIFERATIVE DISORDER: ICD-10-CM

## 2018-02-21 DIAGNOSIS — D64.9 SYMPTOMATIC ANEMIA: Primary | ICD-10-CM

## 2018-02-21 DIAGNOSIS — R05.9 COUGH: ICD-10-CM

## 2018-02-21 PROBLEM — B34.9 ACUTE VIRAL SYNDROME: Status: RESOLVED | Noted: 2018-01-03 | Resolved: 2018-02-21

## 2018-02-21 PROBLEM — N18.30 CKD (CHRONIC KIDNEY DISEASE) STAGE 3, GFR 30-59 ML/MIN: Status: ACTIVE | Noted: 2018-02-21

## 2018-02-21 PROBLEM — N17.9 AKI (ACUTE KIDNEY INJURY): Status: RESOLVED | Noted: 2018-01-25 | Resolved: 2018-02-21

## 2018-02-21 LAB
ABO + RH BLD: NORMAL
ALBUMIN SERPL BCP-MCNC: 3.2 G/DL
ALP SERPL-CCNC: 89 U/L
ALT SERPL W/O P-5'-P-CCNC: 17 U/L
ANION GAP SERPL CALC-SCNC: 12 MMOL/L
ANISOCYTOSIS BLD QL SMEAR: SLIGHT
APTT BLDCRRT: 25.3 SEC
AST SERPL-CCNC: 74 U/L
BACTERIA #/AREA URNS AUTO: NORMAL /HPF
BASOPHILS # BLD AUTO: ABNORMAL K/UL
BASOPHILS NFR BLD: 0 %
BILIRUB SERPL-MCNC: 2.1 MG/DL
BILIRUB UR QL STRIP: NEGATIVE
BLD GP AB SCN CELLS X3 SERPL QL: NORMAL
BLD PROD TYP BPU: NORMAL
BLD PROD TYP BPU: NORMAL
BLOOD UNIT EXPIRATION DATE: NORMAL
BLOOD UNIT EXPIRATION DATE: NORMAL
BLOOD UNIT TYPE CODE: 5100
BLOOD UNIT TYPE CODE: 5100
BLOOD UNIT TYPE: NORMAL
BLOOD UNIT TYPE: NORMAL
BONE MARROW WRIGHT STAIN COMMENT: NORMAL
BUN SERPL-MCNC: 35 MG/DL
CALCIUM SERPL-MCNC: 9.5 MG/DL
CHLORIDE SERPL-SCNC: 108 MMOL/L
CLARITY UR REFRACT.AUTO: ABNORMAL
CO2 SERPL-SCNC: 24 MMOL/L
CODING SYSTEM: NORMAL
CODING SYSTEM: NORMAL
COLOR UR AUTO: YELLOW
CREAT SERPL-MCNC: 1.4 MG/DL
D DIMER PPP IA.FEU-MCNC: 2.68 MG/L FEU
DACRYOCYTES BLD QL SMEAR: ABNORMAL
DIFFERENTIAL METHOD: ABNORMAL
DISPENSE STATUS: NORMAL
DISPENSE STATUS: NORMAL
EOSINOPHIL # BLD AUTO: ABNORMAL K/UL
EOSINOPHIL NFR BLD: 0 %
ERYTHROCYTE [DISTWIDTH] IN BLOOD BY AUTOMATED COUNT: 20.7 %
EST. GFR  (AFRICAN AMERICAN): 41.8 ML/MIN/1.73 M^2
EST. GFR  (NON AFRICAN AMERICAN): 36.3 ML/MIN/1.73 M^2
FIBRINOGEN PPP-MCNC: 669 MG/DL
FLUAV AG SPEC QL IA: NEGATIVE
FLUBV AG SPEC QL IA: NEGATIVE
GLUCOSE SERPL-MCNC: 144 MG/DL
GLUCOSE UR QL STRIP: NEGATIVE
HAV IGM SERPL QL IA: NEGATIVE
HBV CORE IGM SERPL QL IA: NEGATIVE
HBV SURFACE AG SERPL QL IA: NEGATIVE
HCT VFR BLD AUTO: 19.6 %
HCV AB SERPL QL IA: NEGATIVE
HGB BLD-MCNC: 6.1 G/DL
HGB UR QL STRIP: NEGATIVE
HYPOCHROMIA BLD QL SMEAR: ABNORMAL
IMM GRANULOCYTES # BLD AUTO: ABNORMAL K/UL
IMM GRANULOCYTES NFR BLD AUTO: ABNORMAL %
INR PPP: 1.2
KETONES UR QL STRIP: NEGATIVE
LDH SERPL L TO P-CCNC: 1346 U/L
LEUKOCYTE ESTERASE UR QL STRIP: NEGATIVE
LYMPHOCYTES # BLD AUTO: ABNORMAL K/UL
LYMPHOCYTES NFR BLD: 10 %
MAGNESIUM SERPL-MCNC: 2.2 MG/DL
MCH RBC QN AUTO: 31.1 PG
MCHC RBC AUTO-ENTMCNC: 31.1 G/DL
MCV RBC AUTO: 100 FL
METAMYELOCYTES NFR BLD MANUAL: 1 %
MICROSCOPIC COMMENT: NORMAL
MONOCYTES # BLD AUTO: ABNORMAL K/UL
MONOCYTES NFR BLD: 0 %
NEUTROPHILS NFR BLD: 89 %
NITRITE UR QL STRIP: NEGATIVE
NRBC BLD-RTO: 41 /100 WBC
NUM UNITS TRANS PACKED RBC: NORMAL
NUM UNITS TRANS PACKED RBC: NORMAL
OVALOCYTES BLD QL SMEAR: ABNORMAL
PH UR STRIP: 5 [PH] (ref 5–8)
PLATELET # BLD AUTO: 24 K/UL
PLATELET BLD QL SMEAR: ABNORMAL
PMV BLD AUTO: ABNORMAL FL
POIKILOCYTOSIS BLD QL SMEAR: SLIGHT
POLYCHROMASIA BLD QL SMEAR: ABNORMAL
POTASSIUM SERPL-SCNC: 3.9 MMOL/L
PROT SERPL-MCNC: 8.9 G/DL
PROT UR QL STRIP: NEGATIVE
PROTHROMBIN TIME: 11.9 SEC
RBC # BLD AUTO: 1.96 M/UL
RBC #/AREA URNS AUTO: 0 /HPF (ref 0–4)
SODIUM SERPL-SCNC: 144 MMOL/L
SP GR UR STRIP: 1.01 (ref 1–1.03)
SPECIMEN SOURCE: NORMAL
SPHEROCYTES BLD QL SMEAR: ABNORMAL
SQUAMOUS #/AREA URNS AUTO: 1 /HPF
URATE SERPL-MCNC: 7 MG/DL
URN SPEC COLLECT METH UR: ABNORMAL
UROBILINOGEN UR STRIP-ACNC: NEGATIVE EU/DL
WBC # BLD AUTO: 14.04 K/UL
WBC #/AREA URNS AUTO: 2 /HPF (ref 0–5)

## 2018-02-21 PROCEDURE — 25000003 PHARM REV CODE 250: Performed by: NURSE PRACTITIONER

## 2018-02-21 PROCEDURE — 07DR3ZX EXTRACTION OF ILIAC BONE MARROW, PERCUTANEOUS APPROACH, DIAGNOSTIC: ICD-10-PCS | Performed by: INTERNAL MEDICINE

## 2018-02-21 PROCEDURE — 93017 CV STRESS TEST TRACING ONLY: CPT

## 2018-02-21 PROCEDURE — 88189 FLOWCYTOMETRY/READ 16 & >: CPT | Mod: ,,, | Performed by: PATHOLOGY

## 2018-02-21 PROCEDURE — 81310 NPM1 GENE: CPT

## 2018-02-21 PROCEDURE — 88185 FLOWCYTOMETRY/TC ADD-ON: CPT | Performed by: PATHOLOGY

## 2018-02-21 PROCEDURE — 99284 EMERGENCY DEPT VISIT MOD MDM: CPT | Mod: 25

## 2018-02-21 PROCEDURE — 81450 HL NEO GSAP 5-50DNA/DNA&RNA: CPT

## 2018-02-21 PROCEDURE — 88305 TISSUE EXAM BY PATHOLOGIST: CPT | Mod: 26,,, | Performed by: PATHOLOGY

## 2018-02-21 PROCEDURE — 51701 INSERT BLADDER CATHETER: CPT

## 2018-02-21 PROCEDURE — 88237 TISSUE CULTURE BONE MARROW: CPT

## 2018-02-21 PROCEDURE — 88341 IMHCHEM/IMCYTCHM EA ADD ANTB: CPT | Mod: 26,59,, | Performed by: PATHOLOGY

## 2018-02-21 PROCEDURE — 87400 INFLUENZA A/B EACH AG IA: CPT

## 2018-02-21 PROCEDURE — 86850 RBC ANTIBODY SCREEN: CPT

## 2018-02-21 PROCEDURE — P9040 RBC LEUKOREDUCED IRRADIATED: HCPCS

## 2018-02-21 PROCEDURE — 25000003 PHARM REV CODE 250: Performed by: INTERNAL MEDICINE

## 2018-02-21 PROCEDURE — 83615 LACTATE (LD) (LDH) ENZYME: CPT

## 2018-02-21 PROCEDURE — 38222 DX BONE MARROW BX & ASPIR: CPT | Mod: LT,,, | Performed by: NURSE PRACTITIONER

## 2018-02-21 PROCEDURE — 81245 FLT3 GENE: CPT

## 2018-02-21 PROCEDURE — 88341 IMHCHEM/IMCYTCHM EA ADD ANTB: CPT | Performed by: PATHOLOGY

## 2018-02-21 PROCEDURE — 20600001 HC STEP DOWN PRIVATE ROOM

## 2018-02-21 PROCEDURE — 80053 COMPREHEN METABOLIC PANEL: CPT

## 2018-02-21 PROCEDURE — 85007 BL SMEAR W/DIFF WBC COUNT: CPT

## 2018-02-21 PROCEDURE — 81001 URINALYSIS AUTO W/SCOPE: CPT

## 2018-02-21 PROCEDURE — 36430 TRANSFUSION BLD/BLD COMPNT: CPT

## 2018-02-21 PROCEDURE — 85384 FIBRINOGEN ACTIVITY: CPT

## 2018-02-21 PROCEDURE — 85097 BONE MARROW INTERPRETATION: CPT | Mod: ,,, | Performed by: PATHOLOGY

## 2018-02-21 PROCEDURE — 99223 1ST HOSP IP/OBS HIGH 75: CPT | Mod: AI,GC,, | Performed by: INTERNAL MEDICINE

## 2018-02-21 PROCEDURE — 88184 FLOWCYTOMETRY/ TC 1 MARKER: CPT | Performed by: PATHOLOGY

## 2018-02-21 PROCEDURE — 85730 THROMBOPLASTIN TIME PARTIAL: CPT

## 2018-02-21 PROCEDURE — 84550 ASSAY OF BLOOD/URIC ACID: CPT

## 2018-02-21 PROCEDURE — 88313 SPECIAL STAINS GROUP 2: CPT

## 2018-02-21 PROCEDURE — 96360 HYDRATION IV INFUSION INIT: CPT

## 2018-02-21 PROCEDURE — 88271 CYTOGENETICS DNA PROBE: CPT | Mod: 59

## 2018-02-21 PROCEDURE — 88305 TISSUE EXAM BY PATHOLOGIST: CPT | Performed by: PATHOLOGY

## 2018-02-21 PROCEDURE — 80074 ACUTE HEPATITIS PANEL: CPT

## 2018-02-21 PROCEDURE — 88313 SPECIAL STAINS GROUP 2: CPT | Mod: 26,,, | Performed by: PATHOLOGY

## 2018-02-21 PROCEDURE — 86703 HIV-1/HIV-2 1 RESULT ANTBDY: CPT

## 2018-02-21 PROCEDURE — 63600175 PHARM REV CODE 636 W HCPCS: Performed by: NURSE PRACTITIONER

## 2018-02-21 PROCEDURE — 86920 COMPATIBILITY TEST SPIN: CPT

## 2018-02-21 PROCEDURE — 93010 ELECTROCARDIOGRAM REPORT: CPT | Mod: ,,, | Performed by: INTERNAL MEDICINE

## 2018-02-21 PROCEDURE — 83735 ASSAY OF MAGNESIUM: CPT

## 2018-02-21 PROCEDURE — 81218 CEBPA GENE FULL SEQUENCE: CPT

## 2018-02-21 PROCEDURE — 85379 FIBRIN DEGRADATION QUANT: CPT

## 2018-02-21 PROCEDURE — 85027 COMPLETE CBC AUTOMATED: CPT

## 2018-02-21 PROCEDURE — 82955 ASSAY OF G6PD ENZYME: CPT

## 2018-02-21 PROCEDURE — 88311 DECALCIFY TISSUE: CPT | Mod: 26,,, | Performed by: PATHOLOGY

## 2018-02-21 PROCEDURE — 99499 UNLISTED E&M SERVICE: CPT | Mod: ,,, | Performed by: EMERGENCY MEDICINE

## 2018-02-21 PROCEDURE — 88275 CYTOGENETICS 100-300: CPT

## 2018-02-21 PROCEDURE — 88342 IMHCHEM/IMCYTCHM 1ST ANTB: CPT | Mod: 26,59,, | Performed by: PATHOLOGY

## 2018-02-21 PROCEDURE — 85610 PROTHROMBIN TIME: CPT

## 2018-02-21 PROCEDURE — 88264 CHROMOSOME ANALYSIS 20-25: CPT

## 2018-02-21 RX ORDER — ACETAMINOPHEN 325 MG/1
650 TABLET ORAL ONCE AS NEEDED
Status: COMPLETED | OUTPATIENT
Start: 2018-02-21 | End: 2018-02-21

## 2018-02-21 RX ORDER — ALLOPURINOL 300 MG/1
300 TABLET ORAL DAILY
Status: DISCONTINUED | OUTPATIENT
Start: 2018-02-22 | End: 2018-02-22 | Stop reason: HOSPADM

## 2018-02-21 RX ORDER — LIDOCAINE HYDROCHLORIDE 20 MG/ML
10 INJECTION, SOLUTION INFILTRATION; PERINEURAL
Status: DISCONTINUED | OUTPATIENT
Start: 2018-02-21 | End: 2018-02-22 | Stop reason: HOSPADM

## 2018-02-21 RX ORDER — ASPIRIN 81 MG/1
81 TABLET ORAL DAILY
Status: ON HOLD | COMMUNITY
End: 2018-02-22 | Stop reason: HOSPADM

## 2018-02-21 RX ORDER — SODIUM CHLORIDE 9 MG/ML
INJECTION, SOLUTION INTRAVENOUS CONTINUOUS
Status: DISCONTINUED | OUTPATIENT
Start: 2018-02-21 | End: 2018-02-22 | Stop reason: HOSPADM

## 2018-02-21 RX ORDER — HYDROCODONE BITARTRATE AND ACETAMINOPHEN 500; 5 MG/1; MG/1
TABLET ORAL
Status: DISCONTINUED | OUTPATIENT
Start: 2018-02-21 | End: 2018-02-22

## 2018-02-21 RX ORDER — LORAZEPAM 2 MG/ML
1 INJECTION INTRAMUSCULAR
Status: DISCONTINUED | OUTPATIENT
Start: 2018-02-21 | End: 2018-02-22 | Stop reason: HOSPADM

## 2018-02-21 RX ORDER — SODIUM CHLORIDE 0.9 % (FLUSH) 0.9 %
5 SYRINGE (ML) INJECTION
Status: DISCONTINUED | OUTPATIENT
Start: 2018-02-21 | End: 2018-02-22 | Stop reason: HOSPADM

## 2018-02-21 RX ORDER — HYDROXYUREA 500 MG/1
1000 CAPSULE ORAL 2 TIMES DAILY
Status: DISCONTINUED | OUTPATIENT
Start: 2018-02-21 | End: 2018-02-22

## 2018-02-21 RX ORDER — DIPHENHYDRAMINE HCL 25 MG
25 CAPSULE ORAL ONCE AS NEEDED
Status: COMPLETED | OUTPATIENT
Start: 2018-02-21 | End: 2018-02-21

## 2018-02-21 RX ORDER — ZINC GLUCONATE 50 MG
50 TABLET ORAL DAILY
COMMUNITY

## 2018-02-21 RX ORDER — ALLOPURINOL 100 MG/1
200 TABLET ORAL ONCE
Status: COMPLETED | OUTPATIENT
Start: 2018-02-21 | End: 2018-02-21

## 2018-02-21 RX ADMIN — ALLOPURINOL 200 MG: 100 TABLET ORAL at 12:02

## 2018-02-21 RX ADMIN — LIDOCAINE HYDROCHLORIDE 10 ML: 20 INJECTION, SOLUTION INFILTRATION; PERINEURAL at 03:02

## 2018-02-21 RX ADMIN — LORAZEPAM 1 MG: 2 INJECTION INTRAMUSCULAR; INTRAVENOUS at 03:02

## 2018-02-21 RX ADMIN — SODIUM CHLORIDE: 0.9 INJECTION, SOLUTION INTRAVENOUS at 01:02

## 2018-02-21 RX ADMIN — HYDROXYUREA 1000 MG: 500 CAPSULE ORAL at 08:02

## 2018-02-21 RX ADMIN — SODIUM CHLORIDE 500 ML: 0.9 INJECTION, SOLUTION INTRAVENOUS at 04:02

## 2018-02-21 RX ADMIN — ACETAMINOPHEN 650 MG: 325 TABLET ORAL at 08:02

## 2018-02-21 RX ADMIN — DIPHENHYDRAMINE HYDROCHLORIDE 25 MG: 25 CAPSULE ORAL at 08:02

## 2018-02-21 NOTE — ED NOTES
LOC: The patient is awake, alert and aware of environment with a flat affect, the patient is oriented to person, place and time. Pt is disoriented to situation.   APPEARANCE: Patient resting comfortably and in no acute distress, patient is clean and well groomed, patient's clothing is properly fastened. Pt is thin and frail.   SKIN: The skin is warm and dry, patient has normal skin turgor and moist mucus membranes, skin intact, no breakdown or brusing noted.  MUSKULOSKELETAL: Patient moving all extremities well, no obvious swelling or deformities noted. Pt's family reports pt has been more weak than normal.   RESPIRATORY: Airway is open and patent, respirations are spontaneous, patient has a normal effort and rate. Breath sounds are clear and equal bilaterally.  CARDIAC:  No peripheral edema.  ABDOMEN: Soft and non tender to palpation, no distention noted.   NEURO: No neuro deficits, hand grasp equal, no drift noted, no facial droop noted. Speech is clear.

## 2018-02-21 NOTE — H&P
[facility]  Hematology  Bone Marrow Transplant  H&P    Subjective:     Principal Problem: Myeloproliferative Disorder     HPI:   Mrs. Hammer presents to ED with  and daughter. 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 Parmar in clinic). Hydrea started on 2/8/18 for leukocytosis.  called Dr. Parmar's office yesterday (2/20/17) worried about his wife and stating she was having confusion. Educated to come in to ED to evaluate for acute leukemia. Presents with confusion x2 weeks.  and daughter state that she gets confused like this when her blood counts are low. They state this has happened multiple times before and her confusion improves after blood transfusions. Daughter states she has not been eating well x1 week. Family states she did not drink much water the past few days. Low grade fever at home per family. Afebrile today. Presents with tachycardia. Presents with leukocytosis, thrombocytopenia, and symptomatic anemia. Will transfuse 2 U PRBCs. Will admit to inpatient and perform BM BX today. Will give 500ml NS bolus and start IVF at 150ml/hr. PMH includes HTN but patient is not currently taking any medications for this per family.     Patient information was obtained from patient, spouse/SO, relative(s), past medical records and ER records.     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 Parmar in clinic). Hydrea started on 2/8/18 for leukocytosis.      She was first noted to have an elevated platelet count in early 2011 with CBC 6/7/11 showing WBC 56.9K, Hb 12.0, Plt 1090K (74P, 21L).  On 7/8/11 she had a marrow biopsy showing 90% cellularity with megakaryocyte hyperplasia with dysmorphic features. Myeloid hyperplasia with left shift. No lymphoid infiltrate or increased blasts. Flow cytometry showed virtually no B cells but no other concerning  features. Cytogentics 46,XY[20]. Bcr-abl negative. Testing for Jak2 V617F on 7/7/15 was positive in 5% of total Jak2 DNA. Additional testing that same day for CalR and Mpl exon 10 mutation were both negative. Hence, she was diagnosed with Jak2+ ET.  She was started on hydroxyurea and ASA for essential thrombocythemia and had been stable on this until she began to require blood transfusions early in 2015. Her RBC transfusion needs slowly increased. She was continued on 1500 mg hydroxyurea, her same dose for many years. Her transfusion needs increased along with an alarming increase in her WBC.     CBC 6/18/15 WBC 31.8K, hemoglobin 4.4, platelets 212K. She received 4U PRBC and CBC on 6/30 WBC 23K, hemoglobin 10.3, platelets 97K.  A repeat marrow on 1/26/16 showed a 100% cellular marrow with increased megakaryocytes and storage iron and only occasional blasts on CD34 staining (morphologic blast count 1%).  Jak2 V617F mutation was again detected without CALR or MPL exon 10 mutations.     Hence, she was started on Ruxolitinib 20 mg bid 2/2016.  Her WBC had been controlled since that time but all of her counts fell significantly but 10/9/17 with WBC 16K, hemoglobin 5 and platelets 81K.  Hence, she received 2U RBC; ruxolitinib was decreased to 10 mg BID (10/9/17) with improvement in her counts and acceptable WBC. Ruxolitinib was held around new year 2018 after she was hospitalized at [facility] for likely pneumonia. Her WBC count was 71.54k on 1/15.Differential count showed predominantly granulocytes, bands. No blasts. Hydrea 1g BID was prescribed. She started back on Jakafi(from 1/16/18). WBC 54.6 k on 1/25/18. WBC count 79.8 on 2/5/18. WBC 70.48 on 2/8/18. LDH 1171, uric acid normal, hemoglobin 7.8, plts 120k. Ruxolitinib stopped 2/8/18. She will stop Jakafi and Hydrea started again.       Patient has no known allergies.     Past Medical History:   Diagnosis Date    Anemia     Bone marrow disorder     Cataract      Encounter for blood transfusion     Hypertension     Sickle cell trait      Past Surgical History:   Procedure Laterality Date     SECTION      HYSTERECTOMY       Family History     Problem Relation (Age of Onset)    Prostate cancer Brother    Sickle cell anemia Father    Thyroid disease Mother        Social History Main Topics    Smoking status: Never Smoker    Smokeless tobacco: Never Used    Alcohol use No    Drug use: No    Sexual activity: Not on file       Review of Systems   Constitutional: Positive for appetite change (decreased appetite) and fever (low grade fever at home per family). Negative for chills.   HENT: Negative for congestion, ear discharge, ear pain, facial swelling, mouth sores, nosebleeds, rhinorrhea and sore throat.    Eyes: Negative for pain, discharge and redness.   Respiratory: Negative for cough, chest tightness, shortness of breath and wheezing.    Cardiovascular: Negative for chest pain, palpitations and leg swelling.   Gastrointestinal: Negative for abdominal distention, abdominal pain, blood in stool, constipation, diarrhea, nausea and vomiting.   Genitourinary: Negative for dysuria and hematuria.   Musculoskeletal: Negative for arthralgias and myalgias.   Skin: Negative for color change, rash and wound.   Neurological: Negative for dizziness, tremors, seizures, syncope, speech difficulty, weakness, numbness and headaches.   Hematological: Negative for adenopathy. Does not bruise/bleed easily.   Psychiatric/Behavioral: Positive for confusion (per family x2 weeks). Negative for agitation.     Objective:     Vital Signs (Most Recent):  Temp: 98.1 °F (36.7 °C) (18 1004)  Pulse: (!) 113 (18 1302)  Resp: 20 (18 1302)  BP: 132/60 (18 1302)  SpO2: 100 % (18 1302) Vital Signs (24h Range):  Temp:  [98.1 °F (36.7 °C)] 98.1 °F (36.7 °C)  Pulse:  [113-125] 113  Resp:  [20] 20  SpO2:  [96 %-100 %] 100 %  BP: (113-144)/(59-66) 132/60     Weight:  49.4 kg (109 lb)  Body mass index is 21.29 kg/m².  Body surface area is 1.45 meters squared.    ECOG SCORE         [unfilled]    Lines/Drains/Airways     Peripheral Intravenous Line                 Peripheral IV - Single Lumen 02/21/18 1053 Left Antecubital less than 1 day         Peripheral IV - Single Lumen 02/21/18 1312 Right Forearm less than 1 day                Physical Exam   Constitutional: She appears well-developed and well-nourished.   HENT:   Head: Normocephalic and atraumatic.   Right Ear: External ear normal.   Left Ear: External ear normal.   Eyes: Conjunctivae and EOM are normal. Pupils are equal, round, and reactive to light. Right eye exhibits no discharge. Left eye exhibits no discharge.   Neck: Normal range of motion. Neck supple.   Cardiovascular: Regular rhythm, normal heart sounds and intact distal pulses.  Tachycardia present.    No murmur heard.  Pulmonary/Chest: Effort normal and breath sounds normal. No respiratory distress. She has no wheezes.   Abdominal: Soft. Bowel sounds are normal. She exhibits no distension and no mass. There is no tenderness.   Musculoskeletal: Normal range of motion.   Neurological: She is alert. She has normal strength. GCS eye subscore is 4. GCS verbal subscore is 4. GCS motor subscore is 6. Pt is oriented to person, place, and time, but has moments of confusion per family.   Skin: Skin is warm and dry. No rash noted.   Psychiatric: She has a normal mood and affect. Her behavior is normal. Judgment and thought content normal.   Nursing note and vitals reviewed.      Significant Labs:   CBC:     Recent Labs  Lab 02/21/18  1102   WBC 14.04*   HGB 6.1*   HCT 19.6*   PLT 24*    and CMP:     Recent Labs  Lab 02/21/18  1102      K 3.9      CO2 24   *   BUN 35*   CREATININE 1.4   CALCIUM 9.5   PROT 8.9*   ALBUMIN 3.2*   BILITOT 2.1*   ALKPHOS 89   AST 74*   ALT 17   ANIONGAP 12   EGFRNONAA 36.3*       Diagnostic Results:  I have reviewed all pertinent  imaging results/findings within the past 24 hours.    Assessment/Plan:     * Myeloproliferative neoplasm    - JAK2 V617K mutation detected 7/7/15. Started on hydrea and ASA for essential thrombocythemia  - Repeat marrow done 1/26/16 after transfusion needs increased as well as her WBC increased.  A repeat marrow on 1/26/16 showed a 100% cellular marrow with increased megakaryocytes and storage iron and only occasional blasts on CD34 staining (morphologic blast count 1%).  Jak2 V617F mutation was again detected without CALR or MPL exon 10 mutations.  - Ruxolitinib 20mg BID started 2/2016; decreased to 10mg BID 10/9/17  - recently seen in clinic with leukocytosis; ruxolitinib stopped 2/8/18 and hydrea 1g BID started  - LDH increased to 1346 today  - continue hydrea 1g BID  - allopurinol 300mg QD  - NS @ 150ml/hr  - BM BX completed 2/21/18: sent for flow, cytogenetics, AML FISH, NGS, FLT3, NPM1, CEBPA           Symptomatic anemia    - 2 U PRBCs transfused  2/21/18 for Hgb 6.1  - monitor; transfuse for Hgb <7  - with confusion; family states that patient always confused when her blood counts are low; states this has happened multiple times before   - with tachycardia; has not been eating/drinking well; 500ml bolus 2/21 with IVF @ 150ml/hr        Tachycardia    - likely secondary to anemia or hypovolemia  - 2 U PRBCs 2/21  - 500ml bolus 2/21  - NS @ 150ml/hr  - will obtain ECG        Hyperuricemia    - uric acid 7.0  - allopurinol 300mg QD        Leukocytosis    - Hydrea 1g BID        Thrombocytopenia    - transfuse for platelet count <10k            VTE Risk Mitigation         Ordered     Medium Risk of VTE  Once      02/21/18 1530     Place CECILIA hose  Until discontinued      02/21/18 1259          Disposition: pending improvement in blood counts     Lara Knight NP  Bone Marrow Transplant  Hematology  [facility]

## 2018-02-21 NOTE — PROCEDURES
PROCEDURE NOTE:  Bone Marrow Biopsy  Date: 2/21/18   Indication: suspected acute leukemia  Consent: Informed consent was obtained from patient.  Timeout: Done and documented.  Site: Left posterior illiac crest.  Position: R lateral   Prep: Betadine.  Needle used: 11 gauge Jamshidi needle.  Anesthetic: 2% lidocaine 5 cc.  Biopsy: The biopsy needle was introduced into the marrow cavity and an aspirate was obtained without complications and sent for flow cytometry, AML FISH, NGS, FLT3, NPM1, CEBPA, and cytogenetics. Aspirate thick and clotting. 2 core biopsies obtained without difficulty and sent for routine histologic examination.  Complications: None.  Disposition: Inpatient  Minimal blood loss    Lara nKight DNP, FNP  Hematology/Bone Marrow Transplant

## 2018-02-21 NOTE — ASSESSMENT & PLAN NOTE
- JAK2 V617K mutation detected 7/7/15. Started on hydrea and ASA for essential thrombocythemia  - Repeat marrow done 1/26/16 after transfusion needs increased as well as her WBC increased.  A repeat marrow on 1/26/16 showed a 100% cellular marrow with increased megakaryocytes and storage iron and only occasional blasts on CD34 staining (morphologic blast count 1%).  Jak2 V617F mutation was again detected without CALR or MPL exon 10 mutations.  - Ruxolitinib 20mg BID started 2/2016; decreased to 10mg BID 10/9/17  - recently seen in clinic with leukocytosis; ruxolitinib stopped 2/8/18 and hydrea 1g BID started  - LDH increased to 1346 today  - continue hydrea 1g BID  - allopurinol 300mg QD  - NS @ 150ml/hr  - BM BX completed 2/21/18: sent for flow, cytogenetics, AML FISH, NGS, FLT3, NPM1, CEBPA

## 2018-02-21 NOTE — ED TRIAGE NOTES
Family states Dr Almeida told the [facility] wanted them to bring the pt to the ED  For a low RBC

## 2018-02-21 NOTE — ED NOTES
Discussed with oncoming nurse that blood has not been released. Nurse verbalized understanding and stated she would transfuse the blood once the pt got upstairs to assigned room/bed.

## 2018-02-21 NOTE — ED TRIAGE NOTES
Pt was sent by Dr. Almeida for evaluation of low H&H. Pt had labs drawn by [facility] on Monday. Pt's family also states that she has mild confusion that usually occurs when her blood count is low.

## 2018-02-21 NOTE — ED PROVIDER NOTES
Encounter Date: 2018    SCRIBE #1 NOTE: I, Jose Pham, am scribing for, and in the presence of,  Tali Gonsales MD. I have scribed the following portions of the note - the Resident attestation.       History     Chief Complaint   Patient presents with    Abnormal Lab     Family states pt has low RBC count and was sent for blood transfusion  Pt has hx of leukemia      77-year-old female with JAK2 V617F myeloproliferative disorder and HTN who presents to the ED for evaluation after abnormal labs last 2018.  Per family at bedside, patient has been confused (forgetful and disoriented) for 3 days, and she usually is confused when her Hb is very low. Patient denies SOB, chest pain, palpitation, dizziness, lightheadedness, or headache. Patient had labs done by HH on  and her Hb reportedly was 5. Family called Dr. Parmar's office (her oncologist) today and were instructed by Dr. Almeida to go to the ED for evaluation and possible transfusion. Her daughter reports that patient has been having low-grade fever (Tmax 100.1F) for 2 weeks with cough, but no SOB or chest pain.      The history is provided by the patient, the spouse, a relative and medical records.     Review of patient's allergies indicates:  No Known Allergies  Past Medical History:   Diagnosis Date    Anemia     Bone marrow disorder     Cataract     Encounter for blood transfusion     Hypertension     Sickle cell trait      Past Surgical History:   Procedure Laterality Date     SECTION      HYSTERECTOMY       Family History   Problem Relation Age of Onset    Thyroid disease Mother     Sickle cell anemia Father     Prostate cancer Brother      Social History   Substance Use Topics    Smoking status: Never Smoker    Smokeless tobacco: Never Used    Alcohol use No     Review of Systems   Constitutional: Positive for fever.   HENT: Negative for sore throat.    Eyes: Negative for visual disturbance.   Respiratory:  Positive for cough. Negative for shortness of breath.    Cardiovascular: Negative for chest pain and palpitations.   Gastrointestinal: Negative for abdominal pain, blood in stool, nausea and vomiting.   Genitourinary: Negative for hematuria.   Skin: Positive for pallor.   Neurological: Negative for light-headedness and headaches.   Psychiatric/Behavioral: Positive for confusion.       Physical Exam     Initial Vitals [02/21/18 1004]   BP Pulse Resp Temp SpO2   (!) 113/59 (!) 125 20 98.1 °F (36.7 °C) 96 %      MAP       77         Physical Exam    Nursing note and vitals reviewed.  Constitutional: She appears well-developed. She appears cachectic.   HENT:   Head: Normocephalic and atraumatic.   Mouth/Throat: Oropharynx is clear and moist.   Eyes: Pupils are equal, round, and reactive to light.   Neck: Neck supple.   Cardiovascular: Normal rate, regular rhythm and normal heart sounds.   Pulmonary/Chest: Breath sounds normal. No respiratory distress.   Abdominal: Soft. Bowel sounds are normal. There is no tenderness.   Genitourinary: Rectal exam shows guaiac positive stool. Guaiac positive stool. : Acceptable.  Genitourinary Comments: CHAS: brown stool with no blood, positive guaiac   Musculoskeletal: She exhibits no edema.   Neurological: She is alert. She is disoriented.   Oriented to person, but place or time   Skin: Skin is warm and dry. There is pallor.         ED Course   Procedures  Labs Reviewed   CBC W/ AUTO DIFFERENTIAL   COMPREHENSIVE METABOLIC PANEL   MAGNESIUM   URINALYSIS, REFLEX TO URINE CULTURE   URIC ACID   PROTIME-INR   APTT   INFLUENZA A AND B ANTIGEN   TYPE & SCREEN          X-Rays:   Independently Interpreted Readings:   Chest X-Ray: Cardiomegaly, poor inspiratory effort.      Medical Decision Making:   History:   Old Medical Records: I decided to obtain old medical records.  Initial Assessment:   77-year-old female with JAK2 V617F myeloproliferative disorder and HTN who presents to  the ED for evaluation after abnormal labs (hb 5 reportedly) last Monday 2/19/2018. Per family at bedside, patient has been confused (forgetful and disoriented) for 3 days, and she usually is confused when her Hb is very low. Her daughter reports that patient has been having low-grade fever (Tmax 100.1F) for 2 weeks with cough, but no SOB or chest pain. On exam, patient is tachycardic with conjunctival pallor, she also disoriented, stool is guaiac positive.  Differential Diagnosis:   Symptomatic anemia, influenza, pneumonia, UTI  Independently Interpreted Test(s):   I have ordered and independently interpreted X-rays - see prior notes.  Clinical Tests:   Lab Tests: Ordered and Reviewed  Radiological Study: Ordered and Reviewed  ED Management:  11:00 AM  - CBC, CMP, INR, uric acid, UA, type and screen ordered.  - CXR ordered.            Scribe Attestation:   Scribe #1: I performed the above scribed service and the documentation accurately describes the services I performed. I attest to the accuracy of the note.    Attending Attestation:   Physician Attestation Statement for Resident:  As the supervising MD   Physician Attestation Statement: I have personally seen and examined this patient.   I agree with the above history. -: 77 y.o. female with history of leukemia with routine blood draws with hemoglobin of 5 on Monday. Patient is at her baseline mental status associated with when she is anemic. Patient has no symptoms of GI bleed. Her guaiac test showed brown stool, guaiac positive. She has history of colonoscopy 8 years ago which was normal. No history of endoscopy. Will discuss with BMT for possible transfusion.     [facility] and they will admit pt; rec leukodepl irradiated 2 units; hr 110; no co at this time; bp stable   As the supervising MD I agree with the above PE.    As the supervising MD I agree with the above treatment, course, plan, and disposition.   -: 1215. Discussed with BMT. They  plan to admit the patient to their service.   I have reviewed and agree with the residents interpretation of the following: lab data and x-rays.  I have reviewed the following: old records at this facility.                       Clinical Impression:   The primary encounter diagnosis was Symptomatic anemia. Diagnoses of Cough and Myeloproliferative disorder were also pertinent to this visit.                           Tali Gonsales MD  02/21/18 132       Tali Gonsales MD  02/21/18 2831

## 2018-02-21 NOTE — SUBJECTIVE & OBJECTIVE
Patient information was obtained from patient, spouse/SO, relative(s), past medical records and ER records.     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 Parmar in clinic). Hydrea started on 2/8/18 for leukocytosis.      She was first noted to have an elevated platelet count in early 2011 with CBC 6/7/11 showing WBC 56.9K, Hb 12.0, Plt 1090K (74P, 21L).  On 7/8/11 she had a marrow biopsy showing 90% cellularity with megakaryocyte hyperplasia with dysmorphic features. Myeloid hyperplasia with left shift. No lymphoid infiltrate or increased blasts. Flow cytometry showed virtually no B cells but no other concerning features. [employer] 46,XY[20]. Bcr-abl negative. Testing for Jak2 V617F on 7/7/15 was positive in 5% of total Jak2 DNA. Additional testing that same day for CalR and Mpl exon 10 mutation were both negative. Hence, she was diagnosed with Jak2+ ET.  She was started on hydroxyurea and ASA for essential thrombocythemia and had been stable on this until she began to require blood transfusions early in 2015. Her RBC transfusion needs slowly increased. She was continued on 1500 mg hydroxyurea, her same dose for many years. Her transfusion needs increased along with an alarming increase in her WBC.     CBC 6/18/15 WBC 31.8K, hemoglobin 4.4, platelets 212K. She received 4U PRBC and CBC on 6/30 WBC 23K, hemoglobin 10.3, platelets 97K.  A repeat marrow on 1/26/16 showed a 100% cellular marrow with increased megakaryocytes and storage iron and only occasional blasts on CD34 staining (morphologic blast count 1%).  Jak2 V617F mutation was again detected without CALR or MPL exon 10 mutations.     Hence, she was started on Ruxolitinib 20 mg bid 2/2016.  Her WBC had been controlled since that time but all of her counts fell significantly but 10/9/17 with WBC 16K, hemoglobin 5 and platelets 81K.  Hence, she received 2U RBC; ruxolitinib was  decreased to 10 mg BID (10/9/17) with improvement in her counts and acceptable WBC. Ruxolitinib was held around new 2018 after she was hospitalized at [facility] for likely pneumonia. Her WBC count was 71.54k on 1/15.Differential count showed predominantly granulocytes, bands. No blasts. Hydrea 1g BID was prescribed. She started back on Jakafi(from 18). WBC 54.6 k on 18. WBC count 79.8 on 18. WBC 70.48 on 18. LDH 1171, uric acid normal, hemoglobin 7.8, plts 120k. Ruxolitinib stopped 18. She will stop Jakafi and Hydrea started again.       (Not in a hospital admission)    Patient has no known allergies.     Past Medical History:   Diagnosis Date    Anemia     Bone marrow disorder     Cataract     Encounter for blood transfusion     Hypertension     Sickle cell trait      Past Surgical History:   Procedure Laterality Date     SECTION      HYSTERECTOMY       Family History     Problem Relation (Age of Onset)    Prostate cancer Brother    Sickle cell anemia Father    Thyroid disease Mother        Social History Main Topics    Smoking status: Never Smoker    Smokeless tobacco: Never Used    Alcohol use No    Drug use: No    Sexual activity: Not on file       Review of Systems   Constitutional: Positive for appetite change (decreased appetite) and fever (low grade fever at home per family). Negative for chills.   HENT: Negative for congestion, ear discharge, ear pain, facial swelling, mouth sores, nosebleeds, rhinorrhea and sore throat.    Eyes: Negative for pain, discharge and redness.   Respiratory: Negative for cough, chest tightness, shortness of breath and wheezing.    Cardiovascular: Negative for chest pain, palpitations and leg swelling.   Gastrointestinal: Negative for abdominal distention, abdominal pain, blood in stool, constipation, diarrhea, nausea and vomiting.   Genitourinary: Negative for dysuria and hematuria.   Musculoskeletal: Negative for arthralgias  and myalgias.   Skin: Negative for color change, rash and wound.   Neurological: Negative for dizziness, tremors, seizures, syncope, speech difficulty, weakness, numbness and headaches.   Hematological: Negative for adenopathy. Does not bruise/bleed easily.   Psychiatric/Behavioral: Positive for confusion (per family x2 weeks). Negative for agitation.     Objective:     Vital Signs (Most Recent):  Temp: 98.1 °F (36.7 °C) (02/21/18 1004)  Pulse: (!) 113 (02/21/18 1302)  Resp: 20 (02/21/18 1302)  BP: 132/60 (02/21/18 1302)  SpO2: 100 % (02/21/18 1302) Vital Signs (24h Range):  Temp:  [98.1 °F (36.7 °C)] 98.1 °F (36.7 °C)  Pulse:  [113-125] 113  Resp:  [20] 20  SpO2:  [96 %-100 %] 100 %  BP: (113-144)/(59-66) 132/60     Weight: 49.4 kg (109 lb)  Body mass index is 21.29 kg/m².  Body surface area is 1.45 meters squared.    ECOG SCORE         [unfilled]    Lines/Drains/Airways     Peripheral Intravenous Line                 Peripheral IV - Single Lumen 02/21/18 1053 Left Antecubital less than 1 day         Peripheral IV - Single Lumen 02/21/18 1312 Right Forearm less than 1 day                Physical Exam   Constitutional: She appears well-developed and well-nourished.   HENT:   Head: Normocephalic and atraumatic.   Right Ear: External ear normal.   Left Ear: External ear normal.   Eyes: Conjunctivae and EOM are normal. Pupils are equal, round, and reactive to light. Right eye exhibits no discharge. Left eye exhibits no discharge.   Neck: Normal range of motion. Neck supple.   Cardiovascular: Regular rhythm, normal heart sounds and intact distal pulses.  Tachycardia present.    No murmur heard.  Pulmonary/Chest: Effort normal and breath sounds normal. No respiratory distress. She has no wheezes.   Abdominal: Soft. Bowel sounds are normal. She exhibits no distension and no mass. There is no tenderness.   Musculoskeletal: Normal range of motion.   Neurological: She is alert. She has normal strength. GCS eye subscore is 4.  GCS verbal subscore is 4. GCS motor subscore is 6.   Skin: Skin is warm and dry. No rash noted.   Psychiatric: She has a normal mood and affect. Her behavior is normal. Judgment and thought content normal.   Nursing note and vitals reviewed.      Significant Labs:   CBC:     Recent Labs  Lab 02/21/18  1102   WBC 14.04*   HGB 6.1*   HCT 19.6*   PLT 24*    and CMP:     Recent Labs  Lab 02/21/18  1102      K 3.9      CO2 24   *   BUN 35*   CREATININE 1.4   CALCIUM 9.5   PROT 8.9*   ALBUMIN 3.2*   BILITOT 2.1*   ALKPHOS 89   AST 74*   ALT 17   ANIONGAP 12   EGFRNONAA 36.3*       Diagnostic Results:  I have reviewed all pertinent imaging results/findings within the past 24 hours.

## 2018-02-21 NOTE — HPI
Mrs. Hammer presents to ED with  and daughter. 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 Parmar in clinic). Hydrea started on 2/8/18 for leukocytosis.  called Dr. Parmar's office yesterday (2/20/17) worried about his wife and stating she was having confusion. Educated to come in to ED to evaluate for acute leukemia. Presents with confusion x2 weeks. Daughter states she has not been eating well x1 week. Presents with tachycardia. Presents with leukocytosis, thrombocytopenia, anemia. Transfused 2 U PRBCs in ED. Will admit and perform BM BX today.    Oncologic History:   She was first noted to have an elevated platelet count in early 2011 with CBC 6/7/11 showing WBC 56.9K, Hb 12.0, Plt 1090K (74P, 21L).  On 7/8/11 she had a marrow biopsy showing 90% cellularity with megakaryocyte hyperplasia with dysmorphic features. Myeloid hyperplasia with left shift. No lymphoid infiltrate or increased blasts. Flow cytometry showed virtually no B cells but no other concerning features. [employer] 46,XY[20]. Bcr-abl negative. Testing for Jak2 V617F on 7/7/15 was positive in 5% of total Jak2 DNA. Additional testing that same day for CalR and Mpl exon 10 mutation were both negative. Hence, she was diagnosed with Jak2+ ET.  She was started on hydroxyurea and ASA for essential thrombocythemia and had been stable on this until she began to require blood transfusions early in 2015. Her RBC transfusion needs slowly increased. She was continued on 1500 mg hydroxyurea, her same dose for many years. Her transfusion needs increased along with an alarming increase in her WBC.     CBC 6/18/15 WBC 31.8K, hemoglobin 4.4, platelets 212K. She received 4U PRBC and CBC on 6/30 WBC 23K, hemoglobin 10.3, platelets 97K.  A repeat marrow on 1/26/16 showed a 100% cellular marrow with increased megakaryocytes and storage iron and only occasional blasts on CD34 staining (morphologic blast count 1%).   Jak2 V617F mutation was again detected without CALR or MPL exon 10 mutations.     Hence, she was started on Ruxolitinib 20 mg bid 2/2016.  Her WBC had been controlled since that time but all of her counts fell significantly but 10/9/17 with WBC 16K, hemoglobin 5 and platelets 81K.  Hence, she received 2U RBC; ruxolitinib was decreased to 10 mg BID (10/9/17) with improvement in her counts and acceptable WBC. Ruxolitinib was held around new year 2018 after she was hospitalized at [facility] for likely pneumonia. Her WBC count was 71.54k on 1/15.Differential count showed predominantly granulocytes, bands. No blasts. Hydrea 1g BID was prescribed. She started back on Jakafi(from 1/16/18). WBC 54.6 k on 1/25/18. WBC count 79.8 on 2/5/18. WBC 70.48 on 2/8/18. LDH 1171, uric acid normal, hemoglobin 7.8, plts 120k. Ruxolitinib stopped 2/8/18. She will stop Jakafi and Hydrea started again.

## 2018-02-21 NOTE — ASSESSMENT & PLAN NOTE
- 2 U PRBCs transfused  2/21/18 for Hgb 6.1  - monitor; transfuse for Hgb <7  - with confusion; family states that patient always confused when her blood counts are low; states this has happened multiple times before   - with tachycardia; has not been eating/drinking well; 500ml bolus 2/21 with IVF @ 150ml/hr

## 2018-02-21 NOTE — ASSESSMENT & PLAN NOTE
- likely secondary to anemia or hypovolemia  - 2 U PRBCs 2/21  - 500ml bolus 2/21  - NS @ 150ml/hr  - will obtain ECG

## 2018-02-22 VITALS
WEIGHT: 104.63 LBS | HEART RATE: 98 BPM | BODY MASS INDEX: 20.54 KG/M2 | OXYGEN SATURATION: 96 % | SYSTOLIC BLOOD PRESSURE: 119 MMHG | DIASTOLIC BLOOD PRESSURE: 58 MMHG | RESPIRATION RATE: 18 BRPM | HEIGHT: 60 IN | TEMPERATURE: 99 F

## 2018-02-22 LAB
ALBUMIN SERPL BCP-MCNC: 2.8 G/DL
ALP SERPL-CCNC: 73 U/L
ALT SERPL W/O P-5'-P-CCNC: 16 U/L
ANION GAP SERPL CALC-SCNC: 8 MMOL/L
ANISOCYTOSIS BLD QL SMEAR: SLIGHT
APTT BLDCRRT: 27.4 SEC
AST SERPL-CCNC: 68 U/L
BASOPHILS # BLD AUTO: 0.02 K/UL
BASOPHILS NFR BLD: 0.4 %
BILIRUB SERPL-MCNC: 1.8 MG/DL
BLD PROD TYP BPU: NORMAL
BLOOD UNIT EXPIRATION DATE: NORMAL
BLOOD UNIT TYPE CODE: 6200
BLOOD UNIT TYPE: NORMAL
BONE MARROW IRON STAIN COMMENT: NORMAL
BUN SERPL-MCNC: 28 MG/DL
CALCIUM SERPL-MCNC: 8.7 MG/DL
CHLORIDE SERPL-SCNC: 111 MMOL/L
CO2 SERPL-SCNC: 24 MMOL/L
CODING SYSTEM: NORMAL
CREAT SERPL-MCNC: 1.2 MG/DL
DIFFERENTIAL METHOD: ABNORMAL
DISPENSE STATUS: NORMAL
EOSINOPHIL # BLD AUTO: 0 K/UL
EOSINOPHIL NFR BLD: 0.4 %
ERYTHROCYTE [DISTWIDTH] IN BLOOD BY AUTOMATED COUNT: 17.7 %
EST. GFR  (AFRICAN AMERICAN): 50.4 ML/MIN/1.73 M^2
EST. GFR  (NON AFRICAN AMERICAN): 43.7 ML/MIN/1.73 M^2
GLUCOSE SERPL-MCNC: 131 MG/DL
HCT VFR BLD AUTO: 24.9 %
HGB BLD-MCNC: 8.1 G/DL
HIV1+2 IGG SERPL QL IA.RAPID: NEGATIVE
HYPOCHROMIA BLD QL SMEAR: ABNORMAL
IMM GRANULOCYTES # BLD AUTO: 0.14 K/UL
IMM GRANULOCYTES NFR BLD AUTO: 2.5 %
INR PPP: 1.1
INR PPP: 1.2
LDH SERPL L TO P-CCNC: 1221 U/L
LYMPHOCYTES # BLD AUTO: 1 K/UL
LYMPHOCYTES NFR BLD: 18.7 %
MAGNESIUM SERPL-MCNC: 1.7 MG/DL
MCH RBC QN AUTO: 30.6 PG
MCHC RBC AUTO-ENTMCNC: 32.5 G/DL
MCV RBC AUTO: 94 FL
MONOCYTES # BLD AUTO: 0.4 K/UL
MONOCYTES NFR BLD: 7.7 %
NEUTROPHILS # BLD AUTO: 3.9 K/UL
NEUTROPHILS NFR BLD: 70.3 %
NRBC BLD-RTO: 49 /100 WBC
NUM UNITS TRANS WBC-POOR PLATPHERESIS: NORMAL
OVALOCYTES BLD QL SMEAR: ABNORMAL
PHOSPHATE SERPL-MCNC: 3.6 MG/DL
PLATELET # BLD AUTO: 14 K/UL
PMV BLD AUTO: ABNORMAL FL
POIKILOCYTOSIS BLD QL SMEAR: SLIGHT
POLYCHROMASIA BLD QL SMEAR: ABNORMAL
POTASSIUM SERPL-SCNC: 4.2 MMOL/L
PROT SERPL-MCNC: 7.6 G/DL
PROTHROMBIN TIME: 11.6 SEC
PROTHROMBIN TIME: 11.9 SEC
RBC # BLD AUTO: 2.65 M/UL
SODIUM SERPL-SCNC: 143 MMOL/L
URATE SERPL-MCNC: 5.4 MG/DL
URATE SERPL-MCNC: 6 MG/DL
WBC # BLD AUTO: 5.56 K/UL

## 2018-02-22 PROCEDURE — 85025 COMPLETE CBC W/AUTO DIFF WBC: CPT

## 2018-02-22 PROCEDURE — G8978 MOBILITY CURRENT STATUS: HCPCS | Mod: CK

## 2018-02-22 PROCEDURE — 87632 RESP VIRUS 6-11 TARGETS: CPT

## 2018-02-22 PROCEDURE — 25000003 PHARM REV CODE 250: Performed by: NURSE PRACTITIONER

## 2018-02-22 PROCEDURE — G8980 MOBILITY D/C STATUS: HCPCS | Mod: CK

## 2018-02-22 PROCEDURE — G8979 MOBILITY GOAL STATUS: HCPCS | Mod: CK

## 2018-02-22 PROCEDURE — 36430 TRANSFUSION BLD/BLD COMPNT: CPT

## 2018-02-22 PROCEDURE — 83615 LACTATE (LD) (LDH) ENZYME: CPT

## 2018-02-22 PROCEDURE — 83735 ASSAY OF MAGNESIUM: CPT

## 2018-02-22 PROCEDURE — 85730 THROMBOPLASTIN TIME PARTIAL: CPT

## 2018-02-22 PROCEDURE — 25000003 PHARM REV CODE 250: Performed by: INTERNAL MEDICINE

## 2018-02-22 PROCEDURE — 88275 CYTOGENETICS 100-300: CPT | Mod: 59

## 2018-02-22 PROCEDURE — 97530 THERAPEUTIC ACTIVITIES: CPT

## 2018-02-22 PROCEDURE — 88271 CYTOGENETICS DNA PROBE: CPT | Mod: 59

## 2018-02-22 PROCEDURE — 85610 PROTHROMBIN TIME: CPT

## 2018-02-22 PROCEDURE — 36415 COLL VENOUS BLD VENIPUNCTURE: CPT

## 2018-02-22 PROCEDURE — 84100 ASSAY OF PHOSPHORUS: CPT

## 2018-02-22 PROCEDURE — 84550 ASSAY OF BLOOD/URIC ACID: CPT

## 2018-02-22 PROCEDURE — P9037 PLATE PHERES LEUKOREDU IRRAD: HCPCS

## 2018-02-22 PROCEDURE — 86644 CMV ANTIBODY: CPT

## 2018-02-22 PROCEDURE — 97161 PT EVAL LOW COMPLEX 20 MIN: CPT

## 2018-02-22 PROCEDURE — 80053 COMPREHEN METABOLIC PANEL: CPT

## 2018-02-22 RX ORDER — DIPHENHYDRAMINE HCL 25 MG
25 CAPSULE ORAL ONCE
Status: COMPLETED | OUTPATIENT
Start: 2018-02-22 | End: 2018-02-22

## 2018-02-22 RX ORDER — HYDROXYUREA 500 MG/1
1000 CAPSULE ORAL DAILY
Qty: 60 CAPSULE | Refills: 0 | Status: SHIPPED | OUTPATIENT
Start: 2018-02-23 | End: 2018-03-07 | Stop reason: SDUPTHER

## 2018-02-22 RX ORDER — ACETAMINOPHEN 325 MG/1
650 TABLET ORAL ONCE
Status: COMPLETED | OUTPATIENT
Start: 2018-02-22 | End: 2018-02-22

## 2018-02-22 RX ORDER — ALLOPURINOL 300 MG/1
300 TABLET ORAL DAILY
Qty: 30 TABLET | Refills: 0 | Status: SHIPPED | OUTPATIENT
Start: 2018-02-23 | End: 2018-03-28

## 2018-02-22 RX ORDER — HYDROXYUREA 500 MG/1
1000 CAPSULE ORAL DAILY
Status: DISCONTINUED | OUTPATIENT
Start: 2018-02-23 | End: 2018-02-22 | Stop reason: HOSPADM

## 2018-02-22 RX ORDER — HYDROCODONE BITARTRATE AND ACETAMINOPHEN 500; 5 MG/1; MG/1
TABLET ORAL
Status: DISCONTINUED | OUTPATIENT
Start: 2018-02-22 | End: 2018-02-22 | Stop reason: HOSPADM

## 2018-02-22 RX ADMIN — SODIUM CHLORIDE: 0.9 INJECTION, SOLUTION INTRAVENOUS at 10:02

## 2018-02-22 RX ADMIN — ACETAMINOPHEN 650 MG: 325 TABLET, FILM COATED ORAL at 11:02

## 2018-02-22 RX ADMIN — HYDROXYUREA 1000 MG: 500 CAPSULE ORAL at 08:02

## 2018-02-22 RX ADMIN — DIPHENHYDRAMINE HYDROCHLORIDE 25 MG: 25 CAPSULE ORAL at 11:02

## 2018-02-22 RX ADMIN — ALLOPURINOL 300 MG: 300 TABLET ORAL at 08:02

## 2018-02-22 RX ADMIN — SODIUM CHLORIDE: 0.9 INJECTION, SOLUTION INTRAVENOUS at 05:02

## 2018-02-22 NOTE — PT/OT/SLP EVAL
"Physical Therapy Evaluation and Discharge Note    Patient Name:  Courtney Hammer   MRN:  [id number]    Recommendations:     Discharge Recommendations:  home health PT   Discharge Equipment Recommendations: none   Barriers to discharge: None    Assessment:     Courtney Hammer is a 77 y.o. female admitted with a medical diagnosis of Myeloproliferative neoplasm.  Pt presents to the hospital for approx 2 weeks of confused behavior/impaired cognition.  Pt currently requires S with all functional mobility assessed, maxA with bowel management sec to impaction.  Pt is to be discharged from the hospital at this time as the pt is medically stable.  Recommendation for pt to receive skilled PT services in the home setting upon discharge.      Recent Surgery: * No surgery found *      Plan:     During this hospitalization, patient does not require further acute PT services.  Please re-consult if situation changes.     Plan of Care Reviewed with: patient    Subjective     Communicated with nursing prior to session.  Patient found in supine upon PT entry to room, agreeable to evaluation.      "I am doing just fine."  "My house is crowded."    Chief Complaint: Hard stools  Patient comments/goals: To go home  Pain/Comfort:  · Pain Rating 1: 0/10    Patients cultural, spiritual, Restoration conflicts given the current situation: no    Living Environment:  Pt lives with family stating that she has three families living in her house, SSH, 5 AMARJIT with BHRs.  Pts family is able to provide transportation.    Prior to admission, patient amb without AD indoors, at times pt amb with outdoors when her daughter insists, pt is bathed daily by an outside caregiver, independent with transfers.  Patient has the following equipment: cane, straight, bedside commode (Pt amb with cane outdoors 'when daughter insists').  DME owned (not currently used): rolling walker.  Upon discharge, patient will have assistance from family.    Objective:     Patient found with: " peripheral IV, telemetry, pulse ox (continuous)     General Precautions: Standard, fall   Orthopedic Precautions:N/A   Braces: N/A     Exams:  · Cognitive Exam:  Patient is oriented to Person, Place, Time and Situation and follows 80% of single step commands; Pt is slow to respond to questions and at times confused  · Fine Motor Coordination:    · -       Impaired  Left hand thumb/finger opposition skills slow to perf and Right hand thumb/finger opposition skills slow to perf  · Gross Motor Coordination:  WFL  · Postural Exam:  Patient presented with the following abnormalities:    · -       No postural abnormalities identified  · Sensation:    · -       Intact  light/touch B LEs - difficult naming body parts  · Skin Integrity/Edema:      · -       Skin integrity: Visible skin intact  · -       Edema: None noted B LEs  · RUE ROM: WFL  · RUE Strength: WFL  · LUE ROM: WFL  · LUE Strength: WFL  · RLE ROM: WFL  · RLE Strength: WFL  · LLE ROM: WFL  · LLE Strength: WFL    Functional Mobility:    · Bed Mobility:     · Rolling Right: supervision  · Scooting: independence, to scoot ant sitting EOB  · Supine to Sit: supervision with increased time to perf with use of bed rails    · Transfers:     · Sit to Stand:  supervision with no AD  · Bed to Chair: supervision without use of AD using Stand Pivot  · Toilet Transfer: supervision with no AD using Stand Pivot    · Gait: Pt amb 18' x 2, S without use of AD, A to push IV pole    · Balance: S: dynamic standing balance without AD    AM-PAC 6 CLICK MOBILITY  Total Score:18       Therapeutic Activities and Exercises:   Whiteboard updated  I with donning socks sitting EOB unsupported  Bowel management: with increased time to perf sec to pt impaction, maxA for hygiene, pt able to assist by maintaining standing balance     Patient left up in chair with all lines intact, call button in reach and nursing notified.    GOALS:    Physical Therapy Goals     Not on file                 History:     Past Medical History:   Diagnosis Date    Anemia     Bone marrow disorder     Cataract     Encounter for blood transfusion     Hypertension     Sickle cell trait        Past Surgical History:   Procedure Laterality Date     SECTION      HYSTERECTOMY         Clinical Decision Making:     History  Co-morbidities and personal factors that may impact the plan of care Examination  Body Structures and Functions, activity limitations and participation restrictions that may impact the plan of care Clinical Presentation   Decision Making/ Complexity Score   Co-morbidities:   [] Time since onset of injury / illness / exacerbation  [x] Status of current condition  []Patient's cognitive status and safety concerns    [] Multiple Medical Problems (see med hx)  Personal Factors:   [] Patient's age  [] Prior Level of function   [x] Patient's home situation (environment and family support)  [] Patient's level of motivation  [] Expected progression of patient      HISTORY:(criteria)    [] 42030 - no personal factors/history    [x] 82230 - has 1-2 personal factor/comorbidity     [] 79638 - has >3 personal factor/comorbidity     Body Regions:  [] Objective examination findings  [] Head     []  Neck  [] Trunk   [] Upper Extremity  [x] Lower Extremity    Body Systems:  [] For communication ability, affect, cognition, language, and learning style: the assessment of the ability to make needs known, consciousness, orientation (person, place, and time), expected emotional /behavioral responses, and learning preferences (eg, learning barriers, education  needs)  [x] For the neuromuscular system: a general assessment of gross coordinated movement (eg, balance, gait, locomotion, transfers, and transitions) and motor function  (motor control and motor learning)  [x] For the musculoskeletal system: the assessment of gross symmetry, gross range of motion, gross strength, height, and weight  [] For the  integumentary system: the assessment of pliability(texture), presence of scar formation, skin color, and skin integrity  [] For cardiovascular/pulmonary system: the assessment of heart rate, respiratory rate, blood pressure, and edema     Activity limitations:    [] Patient's cognitive status and saf ety concerns          [x] Status of current condition      [] Weight bearing restriction  [] Cardiopulmunary Restriction    Participation Restrictions:   [] Goals and goal agreement with the patient     [] Rehab potential (prognosis) and probable outcome      Examination of Body System: (criteria)    [x] 52960 - addressing 1-2 elements    [] 68144 - addressing a total of 3 or more elements     [] 99044 -  Addressing a total of 4 or more elements         Clinical Presentation: (criteria)  Stable - 45248     On examination of body system using standardized tests and measures patient presents with 1-2 elements from any of the following: body structures and functions, activity limitations, and/or participation restrictions.  Leading to a clinical presentation that is considered stable and/or uncomplicated                              Clinical Decision Making  (Eval Complexity):  Low- 82541     Time Tracking:     PT Received On: 02/22/18  PT Start Time: 1113     PT Stop Time: 1203  PT Total Time (min): 50 min     Billable Minutes: Evaluation 15 and Therapeutic Activity 28      Tali Tim, PT  02/22/2018

## 2018-02-22 NOTE — PROGRESS NOTES
Per MD pt anticipated to be stable for DC today. SW spoke to pt's spouse via Tc, he confirmed he can provide transport for pt to return home but it would take two hours to get to the hospital. Spouse also stated they are currently working with [facility] phone [phone] fax [phone]. VIK informed intake at [facility] that pt was admitted 2/21/18 and faxed orders to them for review. Pt to DC home with [facility] and family support. Based on all available information this is a safe and appropriate DC plan.

## 2018-02-22 NOTE — PLAN OF CARE
MDR's with Dr Stout.  Patient admitted with pancytopenia and AMS reported by family.  BMB performed on 2/21 to r/o acute leukemia.  Patient rec'd 2 U PRBC's and will receive 1 U platelets today.  Hydrea given for elevated WBC's and allopurinol for uric acid.  Patient is clinically improving.  Progressing towards possible d/c this afternoon.  HH orders for SN/PT/OT and lab draws placed.  SW arranging HH needs.  The patient's  confirmed that they will be able to provide transport home this evening if d/c.  Message sent to the oncology clinic to request the f/u scheduled on 3/15 be moved up.  Will continue to follow.

## 2018-02-22 NOTE — PROGRESS NOTES
Road Test  Oxygen-Patient tolerating room air, no distress.  Ambulation-Patient up with stand-by assistance.  Devices-Patient going home with no devices  Tolerating-Regular diet.  Elimination-Patient voiding without difficulty, though incontinent.  Self Care-Performs self care with assistance.  Teaching-Verbal and written discharge teaching given to patient and spouse.     Patient tolerates room air, ambulates with stand-by assistance, regular diet, voiding without difficulty, and is going home with no devices. Peripheral IVs removed, catheter intact, dressed with dry gauze and coban. No bleeding present. Patient going home. Discharge paperwork discussed. Medications reviewed. Patient has all belongings and has no questions at this time. Patient to go home with spouse. Awaiting transport.

## 2018-02-22 NOTE — PLAN OF CARE
[facility]    HOME HEALTH ORDERS  FACE TO FACE ENCOUNTER    Patient Name: Courtney Hammer  YOB: 1940    PCP: Florencio Pacheco MD   PCP Address: [address] / [place] [address]  PCP Phone Number: [phone]  PCP Fax: [phone]    Encounter Date: 02/22/2018    Admit to Home Health    Diagnoses:  Active Hospital Problems    Diagnosis  POA    *Myeloproliferative neoplasm [D47.1]  Yes     Priority: 1 - High    Symptomatic anemia [D64.9]  Yes     Priority: 2     Tachycardia [R00.0]  Yes    CKD (chronic kidney disease) stage 3, GFR 30-59 ml/min [N18.3]  Yes    Myeloproliferative disorder [D47.1]  Unknown    Hyperuricemia [E79.0]  Yes    Thrombocytopenia [D69.6]  Yes    Leukocytosis [D72.829]  Yes      Resolved Hospital Problems    Diagnosis Date Resolved POA    Essential thrombocythemia [D47.3] 02/21/2018 Yes       Future Appointments  Date Time Provider Department Center   3/15/2018 11:00 AM Bebeto Parmar MD [facility] HEM ONC [place]           I have seen and examined this patient face to face today. My clinical findings that support the need for the home health skilled services and home bound status are the following:  Weakness/numbness causing balance and gait disturbance due to Malignancy/Cancer making it taxing to leave home.  Medical restrictions requiring assistance of another human to leave home due to  Unstable ambulation.    Allergies:Review of patient's allergies indicates:  No Known Allergies    Diet: regular diet    Activities: activity as tolerated    Nursing:   SN to complete comprehensive assessment including routine vital signs. Instruct on disease process and s/s of complications to report to MD. Review/verify medication list sent home with the patient at time of discharge  and instruct patient/caregiver as needed. Frequency may be adjusted depending on start of care date.    Notify MD if SBP > 160 or < 90; DBP > 90 or < 50; HR > 120 or < 50; Temp >  100.4      CONSULTS:    Physical Therapy to evaluate and treat. Evaluate for home safety and equipment needs; Establish/upgrade home exercise program. Perform / instruct on therapeutic exercises, gait training, transfer training, and Range of Motion.  Occupational Therapy to evaluate and treat. Evaluate home environment for safety and equipment needs. Perform/Instruct on transfers, ADL training, ROM, and therapeutic exercises.    MISCELLANEOUS CARE:  Lab Draws: CBC, CMP, Mg, Phos, LDH, & Uric Acid every Monday, Wednesday, & Friday starting at hospital discharge and ending 3/9/18. Please send labs to Dr. Imelda Stout at [facility]. Fax#: [phone] or upload to [employer].     WOUND CARE ORDERS  n/a      Medications: Review discharge medications with patient and family and provide education.      Current Discharge Medication List      CONTINUE these medications which have CHANGED    Details   allopurinol (ZYLOPRIM) 300 MG tablet Take 1 tablet (300 mg total) by mouth once daily.  Qty: 30 tablet, Refills: 0      hydroxyurea (HYDREA) 500 mg Cap Take 2 capsules (1,000 mg total) by mouth once daily.  Qty: 60 capsule, Refills: 0         CONTINUE these medications which have NOT CHANGED    Details   walker Misc Rolling walker  Qty: 1 each, Refills: 0      zinc gluconate 50 mg tablet Take 50 mg by mouth once daily.         STOP taking these medications       aspirin (ECOTRIN) 81 MG EC tablet Comments:   Reason for Stopping:               I certify that this patient is confined to her home and needs intermittent skilled nursing care, physical therapy and speech therapy.

## 2018-02-22 NOTE — PROGRESS NOTES
Pt arrived to room. Bedside biopsy preformed. Pt noted to have a skin tear to L buttocks on admission. Turning wedge ordered. Pt  and daughter at bedside, answering admission question. Pt outstanding for 2 units of blood. Will assess and continue to monitor.

## 2018-02-22 NOTE — PLAN OF CARE
CM reviewed f/u with patient.  Patient confirmed understanding.  IMM signed and the patient agrees with the d/c plan.      Future Appointments  Date Time Provider Department Center   3/7/2018 8:50 AM LAB, HEMONC CANCER BLDG [facility] LAB HO [place]   3/7/2018 10:20 AM Bebeto Parmar MD [facility] HEM ONC [place]   3/15/2018 11:00 AM Bebeto Parmar MD [facility] HEM ONC [place]        02/22/18 1321   Final Note   Assessment Type Final Discharge Note   Discharge Disposition Home-Health   What phone number can be called within the next 1-3 days to see how you are doing after discharge? ([phone])   Hospital Follow Up  Appt(s) scheduled? Yes   Discharge plans and expectations educations in teach back method with documentation complete? Yes

## 2018-02-22 NOTE — PLAN OF CARE
Problem: Patient Care Overview  Goal: Plan of Care Review  Outcome: Ongoing (interventions implemented as appropriate)  Pt is AAOx4 and is a 1-person assist. VSS and pt is afebrile. Pt had no complaint of pain and slept most of the night. /hr, 1u blood administered.Pt is incontinent of urine and stool, diaper changed x2, cleaned, barrier cream applied. SCD's worn. Oral care performed. Pt remained safe and free of injury through the night. Bed in low and locked position, bed rails up x3, bed alarm set, call bell in reach, non skid socks worn out of bed. Will continue to monitor.

## 2018-02-22 NOTE — DISCHARGE SUMMARY
[facility]  Hematology  Bone Marrow Transplant  Discharge Summary      Patient Name: Courtney Hammer  MRN: [id number]  Admission Date: 2/21/2018  Hospital Length of Stay: 1 days  Discharge Date and Time: 2/22/2018  7:01 PM  Attending Physician: Imelda Stout MD   Discharging Provider: Lara Knight NP  Primary Care Provider: Florencio Pacheco MD    HPI: Mrs. Hammer presents to ED with  and daughter. 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 Parmar in clinic). Hydrea started on 2/8/18 for leukocytosis.  called Dr. Parmar's office yesterday (2/20/17) worried about his wife and stating she was having confusion. Educated to come in to ED to evaluate for acute leukemia. Presents with confusion x2 weeks. Daughter states she has not been eating well x1 week. Presents with tachycardia. Presents with leukocytosis, thrombocytopenia, anemia. Transfused 2 U PRBCs in ED. Will admit and perform BM BX today.    Oncologic History:   She was first noted to have an elevated platelet count in early 2011 with CBC 6/7/11 showing WBC 56.9K, Hb 12.0, Plt 1090K (74P, 21L).  On 7/8/11 she had a marrow biopsy showing 90% cellularity with megakaryocyte hyperplasia with dysmorphic features. Myeloid hyperplasia with left shift. No lymphoid infiltrate or increased blasts. Flow cytometry showed virtually no B cells but no other concerning features. [employer] 46,XY[20]. Bcr-abl negative. Testing for Jak2 V617F on 7/7/15 was positive in 5% of total Jak2 DNA. Additional testing that same day for CalR and Mpl exon 10 mutation were both negative. Hence, she was diagnosed with Jak2+ ET.  She was started on hydroxyurea and ASA for essential thrombocythemia and had been stable on this until she began to require blood transfusions early in 2015. Her RBC transfusion needs slowly increased. She was continued on 1500 mg hydroxyurea, her same dose for many years. Her transfusion  needs increased along with an alarming increase in her WBC.     CBC 6/18/15 WBC 31.8K, hemoglobin 4.4, platelets 212K. She received 4U PRBC and CBC on 6/30 WBC 23K, hemoglobin 10.3, platelets 97K.  A repeat marrow on 1/26/16 showed a 100% cellular marrow with increased megakaryocytes and storage iron and only occasional blasts on CD34 staining (morphologic blast count 1%).  Jak2 V617F mutation was again detected without CALR or MPL exon 10 mutations.     Hence, she was started on Ruxolitinib 20 mg bid 2/2016.  Her WBC had been controlled since that time but all of her counts fell significantly but 10/9/17 with WBC 16K, hemoglobin 5 and platelets 81K.  Hence, she received 2U RBC; ruxolitinib was decreased to 10 mg BID (10/9/17) with improvement in her counts and acceptable WBC. Ruxolitinib was held around new year 2018 after she was hospitalized at [facility] for likely pneumonia. Her WBC count was 71.54k on 1/15.Differential count showed predominantly granulocytes, bands. No blasts. Hydrea 1g BID was prescribed. She started back on Jakafi(from 1/16/18). WBC 54.6 k on 1/25/18. WBC count 79.8 on 2/5/18. WBC 70.48 on 2/8/18. LDH 1171, uric acid normal, hemoglobin 7.8, plts 120k. Ruxolitinib stopped 2/8/18. She will stop Jakafi and Hydrea started again.          Hospital Course:   2/21/18: Pt admitted for acute leukemia workup. Tachycardic. 2 U PRBCs given for anemia. 500ml NS bolus given. NS @ 150ml/hr. Uric acid 7.0. Started on allopurinol 300mg QD. WBC count 14.04, continue with hydrea 1g BID. BM Bx performed and pending.  2/22/18: WBC stabilized to 5.56. Hgb increased to 8.1 Plts 14k. Uric acid down to 5.4 .Decreased hydrea to 1g QD.  Creatinine 1.2 (baseline). LDH 1221 (previously 1346). Flow cytometric analysis of  bone marrow shows populations of polyclonal B lymphocytes and T lymphocytes that are immunophenotypically unremarkable except reversed CD4 to CD8 ratio.  The blast gate is not increased.  Most  cells are in the myeloid cell   gate's.   Flow differential:  Lymphocytes 2.2%, Monocytes 4.7%, Granulocytes  91.0%, Blast  0.8%, Debris/nRBC 1.7%,  Viability 98.2%.   Ok to DC per Dr. Stout with home health. Labs to be drawn MWF until appt with Dr. Parmar. DC home on Hydrea 1g QD and allopurinol 300mg QD. Stop ASA. Platelets transfused prior to discharge.     Consults         Status Ordering Provider     Inpatient consult to Registered Dietitian/Nutritionist  Once     Provider:  (Not yet assigned)    Acknowledged ALBINO GUERRERO          Significant Diagnostic Studies: Labs:   CMP   Recent Labs  Lab 02/21/18  1102 02/22/18  0421    143   K 3.9 4.2    111*   CO2 24 24   * 131*   BUN 35* 28*   CREATININE 1.4 1.2   CALCIUM 9.5 8.7   PROT 8.9* 7.6   ALBUMIN 3.2* 2.8*   BILITOT 2.1* 1.8*   ALKPHOS 89 73   AST 74* 68*   ALT 17 16   ANIONGAP 12 8   ESTGFRAFRICA 41.8* 50.4*   EGFRNONAA 36.3* 43.7*    and CBC   Recent Labs  Lab 02/21/18  1102 02/22/18  0828   WBC 14.04* 5.56   HGB 6.1* 8.1*   HCT 19.6* 24.9*   PLT 24* 14*         Physical Exam   Constitutional: Oriented to person, place, and time. Well-developed and well-nourished.   HENT:   Head: Normocephalic and atraumatic.   Right Ear: External ear normal.   Left Ear: External ear normal.   Eyes: Conjunctivae and EOM are normal. Pupils are equal, round, and reactive to light. Right eye exhibits no discharge. Left eye exhibits no discharge.   Neck: Normal range of motion. Neck supple.   Cardiovascular: Normal rate, regular rhythm, normal heart sounds and intact distal pulses.    No murmur heard.  Pulmonary/Chest: Effort normal and breath sounds normal. No respiratory distress.   Abdominal: Soft. Bowel sounds are normal. No distension and no mass. There is no tenderness.   Musculoskeletal: Normal range of motion.   Neurological: Alert and oriented to person, place, and time. Some confusion at times per family.  Skin: Skin is warm and dry. No rash  noted.   Psychiatric: Normal mood and affect. Behavior is normal. Judgment and thought content normal.   Nursing note and vitals reviewed.        Pending Diagnostic Studies:     Procedure Component Value Units Date/Time    G6PD,Quantitative [[id number]] Collected:  02/21/18 1312    Order Status:  Sent Lab Status:  In process Updated:  02/21/18 1321    Specimen:  Blood from Blood     Tissue Specimen to Pathology, Bone Marrow Aspiration/Biopsy Procedure [[id number]] Collected:  02/21/18 1559    Order Status:  Sent Lab Status:  In process Updated:  02/22/18 1327    Specimen:  Bone Marrow from Bone Marrow Aspirate, Left Iliac Crest         Final Active Diagnoses:    Diagnosis Date Noted POA    PRINCIPAL PROBLEM:  Myeloproliferative neoplasm [D47.1] 01/02/2018 Yes    Symptomatic anemia [D64.9] 02/21/2018 Yes    Tachycardia [R00.0] 02/21/2018 Yes    CKD (chronic kidney disease) stage 3, GFR 30-59 ml/min [N18.3] 02/21/2018 Yes    Myeloproliferative disorder [D47.1]  Unknown    Hyperuricemia [E79.0] 01/15/2018 Yes    Thrombocytopenia [D69.6] 01/15/2018 Yes    Leukocytosis [D72.829] 01/02/2018 Yes      Problems Resolved During this Admission:    Diagnosis Date Noted Date Resolved POA    Essential thrombocythemia [D47.3] 07/02/2015 02/21/2018 Yes      Discharged Condition: stable    Disposition: Home or Self Care    Follow Up:  Follow-up Information     [facility] On 3/7/2018.    Specialty:  Lab  Why:  Labs- 8:50am  Contact information:  [address] [place] [facility] [address] [phone]  Additional information:  [facility] 3rd Floor           Bebeto Parmar MD On 3/7/2018.    Specialty:  Hematology and Oncology  Why:  follow up- 10:20am  Contact information:  [facility] [id number] [phone]                 Patient Instructions:     CBC W/ AUTO DIFFERENTIAL   Standing Status: Standing Number of Occurrences: 50 Standing Exp. Date: 04/01/19      Comprehensive metabolic panel   Standing Status: Standing Number of Occurrences: 50 Standing Exp. Date: 04/01/18     Magnesium   Standing Status: Standing Number of Occurrences: 50 Standing Exp. Date: 04/01/19     PHOSPHORUS   Standing Status: Standing Number of Occurrences: 50 Standing Exp. Date: 04/01/19     LACTATE DEHYDROGENASE   Standing Status: Standing Number of Occurrences: 50 Standing Exp. Date: 04/01/19     URIC ACID   Standing Status: Standing Number of Occurrences: 50 Standing Exp. Date: 04/01/19     CBC auto differential   Standing Status: Future  Standing Exp. Date: 04/23/19     Comprehensive metabolic panel   Standing Status: Future  Standing Exp. Date: 04/23/19     Magnesium   Standing Status: Future  Standing Exp. Date: 04/23/19     Phosphorus   Standing Status: Future  Standing Exp. Date: 04/23/19     LACTATE DEHYDROGENASE   Standing Status: Future  Standing Exp. Date: 04/23/19     URIC ACID   Standing Status: Future  Standing Exp. Date: 04/23/19     Diet Adult Regular     Activity as tolerated     Notify your health care provider if you experience any of the following:  temperature >100.4     Notify your health care provider if you experience any of the following:  persistent nausea and vomiting or diarrhea     Notify your health care provider if you experience any of the following:  severe uncontrolled pain     Notify your health care provider if you experience any of the following:  redness, tenderness, or signs of infection (pain, swelling, redness, odor or green/yellow discharge around incision site)     Notify your health care provider if you experience any of the following:  difficulty breathing or increased cough     Notify your health care provider if you experience any of the following:  severe persistent headache     Notify your health care provider if you experience any of the following:  worsening rash     Notify your health care provider if you experience any of the following:   persistent dizziness, light-headedness, or visual disturbances     Notify your health care provider if you experience any of the following:  increased confusion or weakness     Notify your health care provider if you experience any of the following:     Remove dressing in 24 hours     Type & Screen   Standing Status: Standing Number of Occurrences: 50 Standing Exp. Date: 04/01/19     Type & Screen   Standing Status: Future  Standing Exp. Date: 04/23/19       Medications:  Reconciled Home Medications:   Current Discharge Medication List      CONTINUE these medications which have CHANGED    Details   allopurinol (ZYLOPRIM) 300 MG tablet Take 1 tablet (300 mg total) by mouth once daily.  Qty: 30 tablet, Refills: 0      hydroxyurea (HYDREA) 500 mg Cap Take 2 capsules (1,000 mg total) by mouth once daily.  Qty: 60 capsule, Refills: 0         CONTINUE these medications which have NOT CHANGED    Details   walker Misc Rolling walker  Qty: 1 each, Refills: 0      zinc gluconate 50 mg tablet Take 50 mg by mouth once daily.         STOP taking these medications       aspirin (ECOTRIN) 81 MG EC tablet Comments:   Reason for Stopping:               Lara Knight NP  Bone Marrow Transplant  [facility]

## 2018-02-22 NOTE — PHYSICIAN QUERY
"PT Name: Courtney Hammer  MR #: [id number]    Physician Query Form - Hematology Clarification      CDS/: Sherita Lanza RN             Contact information:[email or web address]  This form is a permanent document in the medical record.      Query Date: February 22, 2018    By submitting this query, we are merely seeking further clarification of documentation. Please utilize your independent clinical judgment when addressing the question(s) below.    The Medical record contains the following:   Indicators  Supporting Clinical Findings Location in Medical Record   X "Anemia" documented Symptomatic anemia H & P   X H & H =  6.1   19.6 Labs 2/21   X BP =                     HR= 132/60, 113 H & P vitals    "GI bleeding" documented      Acute bleeding (Non GI site)     X Transfusion(s) 2 U PRBCs transfused  2/21/18 for Hgb 6. 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 Parmar in clinic). Hydrea started on 2/8/18 for leukocytosis H & P      Ed Provider Note    H & P     Provider, please specify diagnosis or diagnoses associated with above clinical findings.    [  ] Aplastic anemia    [  ] Anemia of chronic disease ( Specify chronic disease)      [  ] Neoplastic disease      [  ] Due to Chemotherapy      [  ] Other (Specify) _______________________________     [  ] Clinically Undetermined     [ x ] Other Hematological Diagnosis (please specify): ___MDS transfusion dependent ______________________________    [  ] Clinically Undetermined       Please document in your progress notes daily for the duration of treatment, until " resolved, and include in your discharge summary.

## 2018-02-23 ENCOUNTER — TELEPHONE (OUTPATIENT)
Dept: HEMATOLOGY/ONCOLOGY | Facility: CLINIC | Age: 78
End: 2018-02-23

## 2018-02-23 LAB
BODY SITE - BONE MARROW: NORMAL
CLINICAL DIAGNOSIS - BONE MARROW: NORMAL
ENTEROVIRUS: NOT DETECTED
FLOW CYTOMETRY ANTIBODIES ANALYZED - BONE MARROW: NORMAL
FLOW CYTOMETRY COMMENT - BONE MARROW: NORMAL
FLOW CYTOMETRY INTERPRETATION - BONE MARROW: NORMAL
HUMAN BOCAVIRUS: NOT DETECTED
HUMAN CORONAVIRUS, COMMON COLD VIRUS: NOT DETECTED
INFLUENZA A - H1N1-09: NOT DETECTED
PARAINFLUENZA: NOT DETECTED
RVP - ADENOVIRUS: NOT DETECTED
RVP - HUMAN METAPNEUMOVIRUS (HMPV): NOT DETECTED
RVP - INFLUENZA A: NOT DETECTED
RVP - INFLUENZA B: NOT DETECTED
RVP - RESPIRATORY SYNCTIAL VIRUS (RSV) A: NOT DETECTED
RVP - RESPIRATORY VIRAL PANEL, SOURCE: NORMAL
RVP - RHINOVIRUS: NOT DETECTED

## 2018-02-26 LAB
AML FISH ADDITIONAL INFORMATION (BM): NORMAL
AML FISH DISCLAIMER (BM): NORMAL
AML FISH REASON FOR REFERRAL (BM): NORMAL
AML FISH RELEASED BY (BM): NORMAL
AML FISH RESULT (BM): NORMAL
AML FISH RESULT SUMMARY (BM): NORMAL
AML FISH RESULT TABLE (BM): NORMAL
CLINICAL CYTOGENETICIST REVIEW: NORMAL
FAMLB SPECIMEN: NORMAL
REF LAB TEST METHOD: NORMAL
SPECIMEN SOURCE: NORMAL

## 2018-02-27 LAB
CEBPA SEQUENCING (BM): NORMAL
FLT3 RESULT: NORMAL
NPM1 FINAL DIAGNOSIS (BONE MARROW): NORMAL
NPM1 SPECIMEN TYPE (BONE MARROW): NORMAL
PATH REPORT.FINAL DX SPEC: NORMAL
SPECIMEN TYPE: NORMAL
SPECIMEN TYPE: NORMAL

## 2018-03-01 LAB
CHROM BANDING METHOD: NORMAL
CHROMOSOME ANALYSIS BM ADDITIONAL INFORMATION: NORMAL
CHROMOSOME ANALYSIS BM RELEASED BY: NORMAL
CHROMOSOME ANALYSIS BM RESULT SUMMARY: NORMAL
CLINICAL CYTOGENETICIST REVIEW: NORMAL
KARYOTYP MAR: NORMAL
REASON FOR REFERRAL (NARRATIVE): NORMAL
REF LAB TEST METHOD: NORMAL
SPECIMEN SOURCE: NORMAL
SPECIMEN: NORMAL

## 2018-03-07 ENCOUNTER — OFFICE VISIT (OUTPATIENT)
Dept: HEMATOLOGY/ONCOLOGY | Facility: CLINIC | Age: 78
End: 2018-03-07
Payer: MEDICARE

## 2018-03-07 ENCOUNTER — TELEPHONE (OUTPATIENT)
Dept: PHARMACY | Facility: CLINIC | Age: 78
End: 2018-03-07

## 2018-03-07 ENCOUNTER — LAB VISIT (OUTPATIENT)
Dept: LAB | Facility: HOSPITAL | Age: 78
End: 2018-03-07
Attending: INTERNAL MEDICINE
Payer: MEDICARE

## 2018-03-07 ENCOUNTER — TELEPHONE (OUTPATIENT)
Dept: HEMATOLOGY/ONCOLOGY | Facility: CLINIC | Age: 78
End: 2018-03-07

## 2018-03-07 VITALS
WEIGHT: 98.13 LBS | TEMPERATURE: 99 F | HEART RATE: 107 BPM | HEIGHT: 60 IN | BODY MASS INDEX: 19.27 KG/M2 | RESPIRATION RATE: 16 BRPM | SYSTOLIC BLOOD PRESSURE: 147 MMHG | OXYGEN SATURATION: 100 % | DIASTOLIC BLOOD PRESSURE: 60 MMHG

## 2018-03-07 DIAGNOSIS — D72.828 OTHER ELEVATED WHITE BLOOD CELL (WBC) COUNT: ICD-10-CM

## 2018-03-07 DIAGNOSIS — D64.9 SYMPTOMATIC ANEMIA: ICD-10-CM

## 2018-03-07 DIAGNOSIS — D64.81 ANEMIA DUE TO ANTINEOPLASTIC CHEMOTHERAPY: ICD-10-CM

## 2018-03-07 DIAGNOSIS — T45.1X5A ANEMIA DUE TO ANTINEOPLASTIC CHEMOTHERAPY: ICD-10-CM

## 2018-03-07 DIAGNOSIS — D47.1 MYELOPROLIFERATIVE NEOPLASM: ICD-10-CM

## 2018-03-07 DIAGNOSIS — E79.0 HYPERURICEMIA: Primary | ICD-10-CM

## 2018-03-07 DIAGNOSIS — D47.3 ESSENTIAL THROMBOCYTHEMIA: ICD-10-CM

## 2018-03-07 LAB
ABO + RH BLD: NORMAL
ALBUMIN SERPL BCP-MCNC: 2.9 G/DL
ALP SERPL-CCNC: 94 U/L
ALT SERPL W/O P-5'-P-CCNC: 16 U/L
ANION GAP SERPL CALC-SCNC: 9 MMOL/L
ANISOCYTOSIS BLD QL SMEAR: SLIGHT
AST SERPL-CCNC: 57 U/L
BASOPHILS NFR BLD: 0 %
BILIRUB SERPL-MCNC: 1 MG/DL
BLD GP AB SCN CELLS X3 SERPL QL: NORMAL
BUN SERPL-MCNC: 7 MG/DL
BURR CELLS BLD QL SMEAR: ABNORMAL
CALCIUM SERPL-MCNC: 9.1 MG/DL
CHLORIDE SERPL-SCNC: 101 MMOL/L
CO2 SERPL-SCNC: 23 MMOL/L
CREAT SERPL-MCNC: 1.3 MG/DL
DIFFERENTIAL METHOD: ABNORMAL
EOSINOPHIL NFR BLD: 0 %
ERYTHROCYTE [DISTWIDTH] IN BLOOD BY AUTOMATED COUNT: 22 %
EST. GFR  (AFRICAN AMERICAN): 45.7 ML/MIN/1.73 M^2
EST. GFR  (NON AFRICAN AMERICAN): 39.7 ML/MIN/1.73 M^2
GIANT PLATELETS BLD QL SMEAR: PRESENT
GLUCOSE SERPL-MCNC: 271 MG/DL
HCT VFR BLD AUTO: 26.8 %
HGB BLD-MCNC: 8.3 G/DL
IMM GRANULOCYTES # BLD AUTO: ABNORMAL K/UL
IMM GRANULOCYTES NFR BLD AUTO: ABNORMAL %
LDH SERPL L TO P-CCNC: 1297 U/L
LYMPHOCYTES NFR BLD: 12 %
MAGNESIUM SERPL-MCNC: 1.5 MG/DL
MCH RBC QN AUTO: 29.4 PG
MCHC RBC AUTO-ENTMCNC: 31 G/DL
MCV RBC AUTO: 95 FL
METAMYELOCYTES NFR BLD MANUAL: 9 %
MONOCYTES NFR BLD: 7 %
MYELOCYTES NFR BLD MANUAL: 8 %
NEUTROPHILS NFR BLD: 45 %
NEUTS BAND NFR BLD MANUAL: 18 %
NRBC BLD-RTO: 9 /100 WBC
OVALOCYTES BLD QL SMEAR: ABNORMAL
PHOSPHATE SERPL-MCNC: 2.3 MG/DL
PLATELET # BLD AUTO: 96 K/UL
PMV BLD AUTO: ABNORMAL FL
POIKILOCYTOSIS BLD QL SMEAR: SLIGHT
POLYCHROMASIA BLD QL SMEAR: ABNORMAL
POTASSIUM SERPL-SCNC: 3.8 MMOL/L
PROMYELOCYTES NFR BLD MANUAL: 1 %
PROT SERPL-MCNC: 7.8 G/DL
RBC # BLD AUTO: 2.82 M/UL
SODIUM SERPL-SCNC: 133 MMOL/L
URATE SERPL-MCNC: 5.2 MG/DL
WBC # BLD AUTO: 35.05 K/UL

## 2018-03-07 PROCEDURE — 80053 COMPREHEN METABOLIC PANEL: CPT

## 2018-03-07 PROCEDURE — 83735 ASSAY OF MAGNESIUM: CPT

## 2018-03-07 PROCEDURE — 83615 LACTATE (LD) (LDH) ENZYME: CPT

## 2018-03-07 PROCEDURE — 84550 ASSAY OF BLOOD/URIC ACID: CPT

## 2018-03-07 PROCEDURE — 36415 COLL VENOUS BLD VENIPUNCTURE: CPT

## 2018-03-07 PROCEDURE — 84100 ASSAY OF PHOSPHORUS: CPT

## 2018-03-07 PROCEDURE — 99213 OFFICE O/P EST LOW 20 MIN: CPT | Mod: PBBFAC,25 | Performed by: INTERNAL MEDICINE

## 2018-03-07 PROCEDURE — 85027 COMPLETE CBC AUTOMATED: CPT

## 2018-03-07 PROCEDURE — 85007 BL SMEAR W/DIFF WBC COUNT: CPT

## 2018-03-07 PROCEDURE — 86850 RBC ANTIBODY SCREEN: CPT

## 2018-03-07 PROCEDURE — 99214 OFFICE O/P EST MOD 30 MIN: CPT | Mod: S$PBB,,, | Performed by: INTERNAL MEDICINE

## 2018-03-07 PROCEDURE — 99999 PR PBB SHADOW E&M-EST. PATIENT-LVL III: CPT | Mod: PBBFAC,,, | Performed by: INTERNAL MEDICINE

## 2018-03-07 RX ORDER — HYDROXYUREA 500 MG/1
1500 CAPSULE ORAL DAILY
Qty: 90 CAPSULE | Refills: 0 | Status: SHIPPED | OUTPATIENT
Start: 2018-03-07 | End: 2018-04-06

## 2018-03-07 RX ORDER — PEAK FLOW METER
EACH MISCELLANEOUS
Refills: 0 | COMMUNITY
Start: 2018-01-09

## 2018-03-07 RX ORDER — MEGESTROL ACETATE 40 MG/ML
SUSPENSION ORAL
Status: ON HOLD | COMMUNITY
Start: 2018-02-27 | End: 2018-06-05 | Stop reason: HOSPADM

## 2018-03-07 NOTE — TELEPHONE ENCOUNTER
Spoke with pharmacy tech, explained that per Dr. Parmar's clinic note on 02/08/18, Eunice has been placed on hold, pharmacy tech verbalized understanding.  Zina

## 2018-03-07 NOTE — TELEPHONE ENCOUNTER
RN from Dr. Parmar's office called to inform us that Ms. Hampton Jakafi is currently on hold.  Per most recent MD office visit, patient is to continue Hydrea and ASA at this time and hold Jakafi.  Patient to follow up with MD on 3/28/18.  [facility] to follow up at this time to determine Jakafi status.

## 2018-03-07 NOTE — TELEPHONE ENCOUNTER
----- Message from Lyric Prather, Patient Care Assistant sent at 3/7/2018  9:22 AM CST -----  Regarding: medication refill  Patient is in need of jakafi refills.    Please send to [facility] if appropriate.    Thank you     Lyric Prather CPhT [facility]  Patient Care Assistant  [phone] ##option 1  fax: [phone]

## 2018-03-21 LAB
ANNOTATION COMMENT IMP: NORMAL
NGS CLINCIAL TRIALS: NORMAL
NGS INDICATION OF TEST: NORMAL
NGS INTERPRETATION: NORMAL
NGS ONCOHEME PANEL GENE LIST: NORMAL
NGS PATHOGENIC MUTATIONS DETECTED: NORMAL
NGS REVIEWED BY:: NORMAL
NGS VARIANTS OF UNKNOWN SIGNIFICANCE: NORMAL
REF LAB TEST METHOD: NORMAL
SPECIMEN SOURCE: NORMAL
TEST PERFORMANCE INFO SPEC: NORMAL

## 2018-03-27 NOTE — PROGRESS NOTES
CC: Myelofibrosis, here for follow up visit          HPI: Mrs. 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 She had not required a blood transfusion since 3/2016 but she was transfused 3 U PRBC on 10/9/17-11/20/17.  None since then.     No fever or other new problems.  She is active despite her anemia.  Diabetes now controlled.     History:  She was first noted to have an elevated platelet count in early 2011 with CBC 6/7/11 showing WBC 56.9K, Hb 12.0, Plt 1090K (74P, 21L).  On 7/8/11 she had a marrow biopsy showing 90% cellularity with megakaryocyte hyperplasia with dysmorphic features. Myeloid hyperplasia with left shift. No lymphoid infiltrate or increased blasts. Flow cytometry showed virtually no B cells but no other concerning features. [employer]ics 46,XY[20]. Bcr-abl negative. Testing for Jak2 V617F on 7/7/15 was positive in 5% of total Jak2 DNA. Additional testing that same day for CalR and Mpl exon 10 mutation were both negative. Hence, she was diagnosed with Jak2+ ET.     She was started on hydroxyurea and ASA for essential thrombocythemia and had been stable on this until she began to require blood transfusions early in 2015. Her RBC transfusion needs slowly increased. She was continued on 1500 mg hydroxyurea, her same dose for many years. Her transfusion needs increased along with an alarming increase in her WBC.     CBC 6/18/15 WBC 31.8K, hemoglobin 4.4, platelets 212K. She received 4U PRBC and CBC on 6/30 WBC 23K, hemoglobin 10.3, platelets 97K.  A repeat marrow on 1/26/16 showed a 100% cellular marrow with increased megakaryocytes and storage iron and only occasional blasts on CD34 staining (morphologic blast count 1%).  Jak2 V617F mutation was again detected without CALR or MPL exon 10 mutations.     Hence, she was started on Ruxolitinib 20 mg bid 2/2015.  This was cut to 10 mg bid as of 10/9/17. She was hospitalized for pneumonia just before new year ( Dec  2017). It has since been stopped due to persistently rising WBC count.  She was hospitalized in late February 2018 for confusion. She had a bone marrow biopsy on 2/21/18. There was no AML or increased blasts. The marrow was very hypercellular.         Interval History: She is here for a follow up visit. . She is now on Hydrea 1500 mg daily. She has weakness, fatigue, confusion. She has poor appetite.     Review of Systems   Constitutional: Positive for malaise/fatigue and weight loss. Negative for fever.   HENT: Negative for congestion, nosebleeds and sore throat.    Eyes: Negative for blurred vision and double vision.   Respiratory: Positive for shortness of breath. Negative for cough, hemoptysis, sputum production and wheezing.    Cardiovascular: Negative for chest pain, palpitations, claudication, leg swelling and PND.   Gastrointestinal: Negative for abdominal pain, blood in stool, constipation, diarrhea, heartburn, nausea and vomiting.   Genitourinary: Negative for dysuria, frequency, hematuria and urgency.   Musculoskeletal: Negative for myalgias and neck pain.   Neurological: Positive for weakness. Negative for dizziness, tingling, tremors, speech change and headaches.   Endo/Heme/Allergies: Does not bruise/bleed easily.   Psychiatric/Behavioral: Positive for memory loss. Negative for depression.         Current Outpatient Prescriptions   Medication Sig    allopurinol (ZYLOPRIM) 300 MG tablet Take 1 tablet (300 mg total) by mouth once daily.    hydroxyurea (HYDREA) 500 mg Cap Take 3 capsules (1,500 mg total) by mouth once daily.    megestrol (MEGACE) 400 mg/10 mL (40 mg/mL) Susp     VIOS AEROSOL DELIVERY SYSTEM [employer] USE AS DIRECTED    walker Misc Rolling walker    zinc gluconate 50 mg tablet Take 50 mg by mouth once daily.     No current facility-administered medications for this visit.      Vitals:    03/28/18 1141   BP: (!) 147/69   Pulse: (!) 111   Resp: 16   Temp: 98.5 °F (36.9 °C)         Physical  "Exam   Constitutional: She is oriented to person, place, and time. She appears well-developed. No distress.   HENT:   Head: Normocephalic and atraumatic.   Mouth/Throat: No oropharyngeal exudate.   Eyes: Pupils are equal, round, and reactive to light. No scleral icterus.   Neck: Normal range of motion.   Cardiovascular: Regular rhythm.    Murmur heard.  She has tchycardia   Pulmonary/Chest: Effort normal and breath sounds normal. No respiratory distress. She has no wheezes.   Abdominal: Soft. She exhibits no distension. There is no tenderness. There is no rebound.   Musculoskeletal: She exhibits no edema.   Lymphadenopathy:     She has no cervical adenopathy.   Neurological: She is alert and oriented to person, place, and time. No cranial nerve deficit.   Skin: Skin is warm.   Psychiatric: She has a normal mood and affect.        2/21/18 Bone marrow biopsy NGS result     "1. ASXL1: Chr20(GRCh37):g.[id number]_31022364insTA; NM_015338.5(ASXL1):c.1848_1849insTA; p.Ccy524*  (45%) 2. BCOR: ChrX(GRCh37):g.09062399S>T; NM_001123385.1(BCOR):c.867G>A; p.Aqu231* (37%)  3. JAK2: Chr9(GRCh37):g.6522799P>T; NM_004972.3(JAK2):c.1849G>T; p.Eeb802Lae (45%)  4. NRAS: Chr1(GRCh37):g.296743044V>T; NM_002524.4(NRAS):c.35G>A; p.Bai43Ies (38%)  5. RUNX1: Chr21(GRCh37):g.92965968F>C; NM_001001890.2(RUNX1):c.512A>G; p.Sgy321Gil (40%)  6. TET2: Chr4(GRCh37):g.189800910bzp; NM_001127208.2(TET2):c.987del; p.Lhy925Ykrkj*18 (43%) Note:     2/21/18 bone marrow biopsy: FINAL PATHOLOGIC DIAGNOSIS  BONE MARROW ASPIRATE SMEARS, TOUCH IMPRINTS, CORE BIOPSY, LEFT ILIAC CREST, AND CLOT SECTION:  --Persistent myeloproliferative neoplasm, see comment.  --Stainable iron is present.  COMMENT: The core biopsy is hypercellular for age (80%). Blasts are not increased by morphology, by immunohistochemistry, or in the corresponding flow cytometric analysis (please see separate report). The majority  of the cellularity is composed of erythroid elements, which " demonstrate a shift towards immaturity and exhibit cytologic atypia. Megakaryocytes appear mildly increased and many appear small/hypolobated. Special stain for  reticulin demonstrates a moderate increase in reticulin fibrosis (MF-2).     Component      Latest Ref Rng & Units 3/28/2018   WBC      3.90 - 12.70 K/uL 12.47   RBC      4.00 - 5.40 M/uL 2.28 (L)   Hemoglobin      12.0 - 16.0 g/dL 6.8 (L)   Hematocrit      37.0 - 48.5 % 21.9 (L)   MCV      82 - 98 fL 96   MCH      27.0 - 31.0 pg 29.8   MCHC      32.0 - 36.0 g/dL 31.1 (L)   RDW      11.5 - 14.5 % 25.2 (H)   Platelets      150 - 350 K/uL 16 (LL)   MPV      9.2 - 12.9 fL SEE COMMENT   Immature Granulocytes      0.0 - 0.5 % CANCELED   Immature Grans (Abs)      0.00 - 0.04 K/uL CANCELED   Lymph #      1.0 - 4.8 K/uL CANCELED   Mono #      0.3 - 1.0 K/uL CANCELED   Eos #      0.0 - 0.5 K/uL CANCELED   Baso #      0.00 - 0.20 K/uL CANCELED   nRBC      0 /100 WBC 20 (A)   Gran%      38.0 - 73.0 % 88.0 (H)   Lymph%      18.0 - 48.0 % 7.0 (L)   Mono%      4.0 - 15.0 % 2.0 (L)   Eosinophil%      0.0 - 8.0 % 1.0   Basophil%      0.0 - 1.9 % 1.0   BANDS      % 1.0   Aniso       Moderate   Poik       Slight   Poly       Occasional   Hypo       Occasional   Ovalocytes       Occasional   Tear Drop Cells       Occasional   Differential Method       Manual   Sodium      136 - 145 mmol/L 132 (L)   Potassium      3.5 - 5.1 mmol/L 2.8 (L)   Chloride      95 - 110 mmol/L 92 (L)   CO2      23 - 29 mmol/L 30 (H)   Glucose      70 - 110 mg/dL 124 (H)   BUN, Bld      8 - 23 mg/dL 31 (H)   Creatinine      0.5 - 1.4 mg/dL 1.4   Calcium      8.7 - 10.5 mg/dL 9.7   Total Protein      6.0 - 8.4 g/dL 8.7 (H)   Albumin      3.5 - 5.2 g/dL 3.5   Total Bilirubin      0.1 - 1.0 mg/dL 2.4 (H)   Alkaline Phosphatase      55 - 135 U/L 96   AST      10 - 40 U/L 62 (H)   ALT      10 - 44 U/L 13   Anion Gap      8 - 16 mmol/L 10   eGFR if African American      >60 mL/min/1.73 m:2 41.8 (A)   eGFR  if non African American      >60 mL/min/1.73 m:2 36.3 (A)   LD      110 - 260 U/L 938 (H)   Uric Acid      2.4 - 5.7 mg/dL 8.2 (H)       Assessment:     1.Ms. Hammer has JAK2 V617F myeloproliferative disorder with acceleration and a rapidly rising WBC in late 2015 with falling hemoglobin. A repeat marrow on 1/26/16 showed a 100% cellular marrow with increased megakaryocytes and storage iron and only occasional blasts on CD34 staining (morphologic blast count 1%).  JAK2 V617F mutation was again detected without CALR or Mpl exon 10 mutations.     Hence, she was started on Ruxolitinib 20 mg bid 2/2016.  Her WBC had been controlled since that time but all of her counts fell significantly but 10/9/17 with WBC 16K, hemoglobin 5 and platelets 81K.  Hence, she received 2U RBC; ruxolitinib was decreased to 10 mg BID with improvement in her counts and acceptable WBC. Ruxolitinib was held around new year 2018 after she was hospitalized at [facility] for likely pneumonia. Her WBC count was 71.54k on 1/15.Differential count showed predominantly granulocytes, bands. No blasts. Hydrea 1g BID was prescribed. WBC 54.6 k on 1/25/18. WBC count 79.8 on 2/5/18. Jakafi was discontinued. No AML or increased blasts on bone marrow biopsy done on 2/21/18. She will continue Hydrea 1 g daily. She will hold ASA.         2. 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 She is on Allopurinol 100mg daily. Uric acid 8.2 today.It will be increased to 100mg BID.      3. Thrombocytopenia: Platelets 16k today. She is asymptomatic.      4. Anemia:She has chronic anemia, worse today. Hemoglobin 6.8. Hydrea will be decreased to 500 mg BID.       5. Hyperglycemia: Blood glucose is 124 today. Dr. Pacheco, her primary care attending, will continue to follow up on her diabetes.     6. FELICE: Serum creatinine 1.3 on 3/7/18. Cr 1.4 today. No clear evidence of tumor lysis. Will follow electrolytes, renal function weekly.     7. Anorexia: She is on  Megace.     8. Hypokalemia:  Possibly secondary to HCTZ. She will start oral potassium      Plan:     1. Hold Jakafi; Decrease Hydrea to 1000 mg daily  2. CBC, CMP, LDH, uric acid weekly  3. Increase Allopurinol to 100mg BID  4. 1 unit PRBC today  5. Start potassium chloride 40mEq daily  6. F/u in 3 weeks

## 2018-03-28 ENCOUNTER — HOSPITAL ENCOUNTER (OUTPATIENT)
Dept: RADIOLOGY | Facility: HOSPITAL | Age: 78
Discharge: HOME | End: 2018-03-28
Attending: INTERNAL MEDICINE
Payer: MEDICARE

## 2018-03-28 ENCOUNTER — INFUSION (OUTPATIENT)
Dept: INFUSION THERAPY | Facility: HOSPITAL | Age: 78
End: 2018-03-28
Attending: INTERNAL MEDICINE
Payer: MEDICARE

## 2018-03-28 ENCOUNTER — OFFICE VISIT (OUTPATIENT)
Dept: HEMATOLOGY/ONCOLOGY | Facility: CLINIC | Age: 78
End: 2018-03-28
Payer: MEDICARE

## 2018-03-28 VITALS
DIASTOLIC BLOOD PRESSURE: 57 MMHG | SYSTOLIC BLOOD PRESSURE: 118 MMHG | RESPIRATION RATE: 16 BRPM | TEMPERATURE: 98 F | HEART RATE: 104 BPM

## 2018-03-28 VITALS
WEIGHT: 89.94 LBS | SYSTOLIC BLOOD PRESSURE: 147 MMHG | OXYGEN SATURATION: 96 % | HEIGHT: 60 IN | BODY MASS INDEX: 17.66 KG/M2 | RESPIRATION RATE: 16 BRPM | DIASTOLIC BLOOD PRESSURE: 69 MMHG | HEART RATE: 111 BPM | TEMPERATURE: 99 F

## 2018-03-28 DIAGNOSIS — T45.1X5A ANEMIA DUE TO ANTINEOPLASTIC CHEMOTHERAPY: ICD-10-CM

## 2018-03-28 DIAGNOSIS — E87.6 HYPOKALEMIA: ICD-10-CM

## 2018-03-28 DIAGNOSIS — D64.81 ANEMIA DUE TO ANTINEOPLASTIC CHEMOTHERAPY: ICD-10-CM

## 2018-03-28 DIAGNOSIS — D69.6 THROMBOCYTOPENIA: Primary | ICD-10-CM

## 2018-03-28 DIAGNOSIS — D47.1 MYELOPROLIFERATIVE NEOPLASM: ICD-10-CM

## 2018-03-28 DIAGNOSIS — D47.1 MYELOPROLIFERATIVE NEOPLASM: Primary | ICD-10-CM

## 2018-03-28 DIAGNOSIS — E79.0 HYPERURICEMIA: ICD-10-CM

## 2018-03-28 DIAGNOSIS — D69.6 THROMBOCYTOPENIA: ICD-10-CM

## 2018-03-28 LAB
BLD PROD TYP BPU: NORMAL
BLOOD UNIT EXPIRATION DATE: NORMAL
BLOOD UNIT TYPE CODE: 5100
BLOOD UNIT TYPE: NORMAL
CODING SYSTEM: NORMAL
DISPENSE STATUS: NORMAL
NUM UNITS TRANS PACKED RBC: NORMAL

## 2018-03-28 PROCEDURE — 76700 US EXAM ABDOM COMPLETE: CPT | Mod: TC

## 2018-03-28 PROCEDURE — 99999 PR PBB SHADOW E&M-EST. PATIENT-LVL III: CPT | Mod: PBBFAC,,, | Performed by: INTERNAL MEDICINE

## 2018-03-28 PROCEDURE — 99213 OFFICE O/P EST LOW 20 MIN: CPT | Mod: PBBFAC,25 | Performed by: INTERNAL MEDICINE

## 2018-03-28 PROCEDURE — 36430 TRANSFUSION BLD/BLD COMPNT: CPT

## 2018-03-28 PROCEDURE — 99214 OFFICE O/P EST MOD 30 MIN: CPT | Mod: S$PBB,,, | Performed by: INTERNAL MEDICINE

## 2018-03-28 PROCEDURE — P9038 RBC IRRADIATED: HCPCS

## 2018-03-28 PROCEDURE — 86920 COMPATIBILITY TEST SPIN: CPT

## 2018-03-28 PROCEDURE — 76700 US EXAM ABDOM COMPLETE: CPT | Mod: 26,,, | Performed by: RADIOLOGY

## 2018-03-28 PROCEDURE — 27201040 HC RC 50 FILTER

## 2018-03-28 PROCEDURE — 25000003 PHARM REV CODE 250: Performed by: INTERNAL MEDICINE

## 2018-03-28 RX ORDER — HYDROCHLOROTHIAZIDE 12.5 MG/1
CAPSULE ORAL
Status: ON HOLD | COMMUNITY
Start: 2018-03-12 | End: 2018-06-05 | Stop reason: HOSPADM

## 2018-03-28 RX ORDER — ACETAMINOPHEN 325 MG/1
650 TABLET ORAL
Status: COMPLETED | OUTPATIENT
Start: 2018-03-28 | End: 2018-03-28

## 2018-03-28 RX ORDER — ALLOPURINOL 100 MG/1
TABLET ORAL
COMMUNITY
Start: 2018-03-17 | End: 2018-03-28

## 2018-03-28 RX ORDER — PROMETHAZINE HYDROCHLORIDE 6.25 MG/5ML
SYRUP ORAL
Status: ON HOLD | COMMUNITY
Start: 2018-03-12 | End: 2018-06-05

## 2018-03-28 RX ORDER — ALLOPURINOL 100 MG/1
200 TABLET ORAL DAILY
Qty: 60 TABLET | Refills: 2 | Status: SHIPPED | OUTPATIENT
Start: 2018-03-28 | End: 2019-03-28

## 2018-03-28 RX ORDER — HYDROCODONE BITARTRATE AND ACETAMINOPHEN 500; 5 MG/1; MG/1
TABLET ORAL ONCE
Status: COMPLETED | OUTPATIENT
Start: 2018-03-28 | End: 2018-03-28

## 2018-03-28 RX ORDER — POTASSIUM CHLORIDE 20 MEQ/1
40 TABLET, EXTENDED RELEASE ORAL DAILY
Qty: 60 TABLET | Refills: 11 | Status: ON HOLD | OUTPATIENT
Start: 2018-03-28 | End: 2018-06-05 | Stop reason: HOSPADM

## 2018-03-28 RX ORDER — HYDROCODONE BITARTRATE AND ACETAMINOPHEN 500; 5 MG/1; MG/1
TABLET ORAL ONCE
Status: CANCELLED | OUTPATIENT
Start: 2018-03-28 | End: 2018-03-28

## 2018-03-28 RX ADMIN — SODIUM CHLORIDE: 9 INJECTION, SOLUTION INTRAVENOUS at 01:03

## 2018-03-28 RX ADMIN — ACETAMINOPHEN 650 MG: 325 TABLET, FILM COATED ORAL at 01:03

## 2018-03-28 NOTE — PLAN OF CARE
Problem: Anemia (Adult)  Goal: Identify Related Risk Factors and Signs and Symptoms  Related risk factors and signs and symptoms are identified upon initiation of Human [employer] Clinical Practice Guideline (CPG)  Outcome: Ongoing (interventions implemented as appropriate)  Pt arrived via wheelchair with  and male  pushing wheelchair.  Pt understands that she is to receive blood for a transfusion.  Assessment per flow sheet will monitor while on unit.

## 2018-03-28 NOTE — PLAN OF CARE
Problem: Patient Care Overview  Goal: Plan of Care Review  Outcome: Ongoing (interventions implemented as appropriate)  Patient received 1 unit of blood without any issues. Notified to call MD with any further concerns . Vss. No apparent distress noted.

## 2018-04-16 ENCOUNTER — TELEPHONE (OUTPATIENT)
Dept: HEMATOLOGY/ONCOLOGY | Facility: CLINIC | Age: 78
End: 2018-04-16

## 2018-04-18 ENCOUNTER — INFUSION (OUTPATIENT)
Dept: INFUSION THERAPY | Facility: HOSPITAL | Age: 78
End: 2018-04-18
Attending: INTERNAL MEDICINE
Payer: MEDICARE

## 2018-04-18 ENCOUNTER — OFFICE VISIT (OUTPATIENT)
Dept: HEMATOLOGY/ONCOLOGY | Facility: CLINIC | Age: 78
End: 2018-04-18
Payer: MEDICARE

## 2018-04-18 VITALS
WEIGHT: 85.75 LBS | RESPIRATION RATE: 18 BRPM | DIASTOLIC BLOOD PRESSURE: 55 MMHG | HEART RATE: 138 BPM | SYSTOLIC BLOOD PRESSURE: 125 MMHG | TEMPERATURE: 99 F | OXYGEN SATURATION: 100 % | HEIGHT: 60 IN | BODY MASS INDEX: 16.84 KG/M2

## 2018-04-18 VITALS
HEART RATE: 120 BPM | DIASTOLIC BLOOD PRESSURE: 63 MMHG | RESPIRATION RATE: 16 BRPM | TEMPERATURE: 100 F | SYSTOLIC BLOOD PRESSURE: 126 MMHG

## 2018-04-18 DIAGNOSIS — D47.1 MYELOPROLIFERATIVE NEOPLASM: ICD-10-CM

## 2018-04-18 DIAGNOSIS — D64.81 ANEMIA DUE TO ANTINEOPLASTIC CHEMOTHERAPY: ICD-10-CM

## 2018-04-18 DIAGNOSIS — D69.6 THROMBOCYTOPENIA: ICD-10-CM

## 2018-04-18 DIAGNOSIS — N18.30 CKD (CHRONIC KIDNEY DISEASE) STAGE 3, GFR 30-59 ML/MIN: Primary | ICD-10-CM

## 2018-04-18 DIAGNOSIS — T45.1X5A ANEMIA DUE TO ANTINEOPLASTIC CHEMOTHERAPY: ICD-10-CM

## 2018-04-18 PROCEDURE — 86920 COMPATIBILITY TEST SPIN: CPT

## 2018-04-18 PROCEDURE — 99213 OFFICE O/P EST LOW 20 MIN: CPT | Mod: PBBFAC,25 | Performed by: INTERNAL MEDICINE

## 2018-04-18 PROCEDURE — 99214 OFFICE O/P EST MOD 30 MIN: CPT | Mod: S$PBB,,, | Performed by: INTERNAL MEDICINE

## 2018-04-18 PROCEDURE — 99999 PR PBB SHADOW E&M-EST. PATIENT-LVL III: CPT | Mod: PBBFAC,,, | Performed by: INTERNAL MEDICINE

## 2018-04-18 PROCEDURE — P9040 RBC LEUKOREDUCED IRRADIATED: HCPCS

## 2018-04-18 PROCEDURE — 25000003 PHARM REV CODE 250: Performed by: INTERNAL MEDICINE

## 2018-04-18 PROCEDURE — 36430 TRANSFUSION BLD/BLD COMPNT: CPT

## 2018-04-18 RX ORDER — HYDROCODONE BITARTRATE AND ACETAMINOPHEN 500; 5 MG/1; MG/1
TABLET ORAL ONCE
Status: CANCELLED | OUTPATIENT
Start: 2018-04-18 | End: 2018-04-18

## 2018-04-18 RX ORDER — HYDROCODONE BITARTRATE AND ACETAMINOPHEN 500; 5 MG/1; MG/1
TABLET ORAL ONCE
Status: COMPLETED | OUTPATIENT
Start: 2018-04-18 | End: 2018-04-18

## 2018-04-18 RX ADMIN — SODIUM CHLORIDE: 900 INJECTION, SOLUTION INTRAVENOUS at 01:04

## 2018-04-18 NOTE — PLAN OF CARE
Problem: Patient Care Overview  Goal: Plan of Care Review  Outcome: Ongoing (interventions implemented as appropriate)  Pt completed 2 units PRBCs without difficulty. PIV removed. VSS; NAD. Discharging via wheelchair with  and son in law at side.

## 2018-04-18 NOTE — Clinical Note
Cbc weekly (separate prescription given to pt) Cbc, Cmp, ldh, uric acid, type and screen and f/u in 3 weeks

## 2018-04-18 NOTE — PROGRESS NOTES
CC: Myelofibrosis, here for follow up visit           HPI: Mrs. 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 She had not required a blood transfusion since 3/2016 but she was transfused 3 U PRBC on 10/9/17-11/20/17.  None since then.     No fever or other new problems.  She is active despite her anemia.  Diabetes now controlled.     History:  She was first noted to have an elevated platelet count in early 2011 with CBC 6/7/11 showing WBC 56.9K, Hb 12.0, Plt 1090K (74P, 21L).  On 7/8/11 she had a marrow biopsy showing 90% cellularity with megakaryocyte hyperplasia with dysmorphic features. Myeloid hyperplasia with left shift. No lymphoid infiltrate or increased blasts. Flow cytometry showed virtually no B cells but no other concerning features. [employer] 46,XY[20]. Bcr-abl negative. Testing for Jak2 V617F on 7/7/15 was positive in 5% of total Jak2 DNA. Additional testing that same day for CalR and Mpl exon 10 mutation were both negative. Hence, she was diagnosed with Jak2+ ET.     She was started on hydroxyurea and ASA for essential thrombocythemia and had been stable on this until she began to require blood transfusions early in 2015. Her RBC transfusion needs slowly increased. She was continued on 1500 mg hydroxyurea, her same dose for many years. Her transfusion needs increased along with an alarming increase in her WBC.     CBC 6/18/15 WBC 31.8K, hemoglobin 4.4, platelets 212K. She received 4U PRBC and CBC on 6/30 WBC 23K, hemoglobin 10.3, platelets 97K.  A repeat marrow on 1/26/16 showed a 100% cellular marrow with increased megakaryocytes and storage iron and only occasional blasts on CD34 staining (morphologic blast count 1%).  Jak2 V617F mutation was again detected without CALR or MPL exon 10 mutations.     Hence, she was started on Ruxolitinib 20 mg bid 2/2015.  This was cut to 10 mg bid as of 10/9/17. She was hospitalized for pneumonia just before new year ( Dec  2017). It has since been stopped due to persistently rising WBC count.  She was hospitalized in late February 2018 for confusion. She had a bone marrow biopsy on 2/21/18. There was no AML or increased blasts. The marrow was very hypercellular.          Interval History: She is here for a follow up visit. . She is now on Hydrea 1000 mg daily. She continues to be very weak, fatigued, and confused. She has poor appetite.     Review of Systems   Constitutional: Positive for malaise/fatigue. Negative for chills and fever.   HENT: Negative.    Eyes: Negative.    Respiratory: Positive for shortness of breath.    Cardiovascular: Negative.    Gastrointestinal: Negative.    Genitourinary: Negative.    Musculoskeletal: Negative.    Skin: Negative for rash.   Neurological: Negative.    Endo/Heme/Allergies: Bruises/bleeds easily.   Psychiatric/Behavioral: Negative.        Vitals:    04/18/18 1124   BP: (!) 125/55   Pulse: (!) 138   Resp: 18   Temp: 98.8 °F (37.1 °C)        Physical Exam   Constitutional:   She appears cachectic   Eyes: Pupils are equal, round, and reactive to light.   Neck: Normal range of motion. Neck supple.   Cardiovascular:   She has tachycardia   Pulmonary/Chest: Effort normal. No respiratory distress. She has no wheezes.   Abdominal: Soft. She exhibits distension and mass.   Musculoskeletal: She exhibits no edema.   Lymphadenopathy:     She has no cervical adenopathy.   Neurological: She is alert.   She is in a wheel chair. She appears confused   Skin: Skin is warm.   Psychiatric: She has a normal mood and affect.      Component      Latest Ref Rng & Units 4/18/2018   WBC      3.90 - 12.70 K/uL 38.15 (H)   RBC      4.00 - 5.40 M/uL 2.16 (L)   Hemoglobin      12.0 - 16.0 g/dL 6.4 (L)   Hematocrit      37.0 - 48.5 % 21.2 (L)   MCV      82 - 98 fL 98   MCH      27.0 - 31.0 pg 29.6   MCHC      32.0 - 36.0 g/dL 30.2 (L)   RDW      11.5 - 14.5 % 25.4 (H)   Platelets      150 - 350 K/uL 33 (LL)   MPV      9.2 -  "12.9 fL SEE COMMENT   Immature Granulocytes      0.0 - 0.5 % Test Not Performed   Immature Grans (Abs)      0.00 - 0.04 K/uL Test Not Performed   nRBC      0 /100 WBC 8 (A)   Gran%      38.0 - 73.0 % 80.0 (H)   Lymph%      18.0 - 48.0 % 8.0 (L)   Mono%      4.0 - 15.0 % 5.0   Eosinophil%      0.0 - 8.0 % 3.0   Basophil%      0.0 - 1.9 % 0.0   BANDS      % 3.0   Myelocytes      % 1.0   Platelet Estimate       Decreased (A)   Aniso       Moderate   Poik       Slight   Poly       Occasional   Ovalocytes       Occasional   Basophilic Stippling       Occasional   Large/Giant Platelets       Present   Differential Method       Manual   Sodium      136 - 145 mmol/L 134 (L)   Potassium      3.5 - 5.1 mmol/L 4.1   Chloride      95 - 110 mmol/L 99   CO2      23 - 29 mmol/L 25   Glucose      70 - 110 mg/dL 176 (H)   BUN, Bld      8 - 23 mg/dL 23   Creatinine      0.5 - 1.4 mg/dL 1.3   Calcium      8.7 - 10.5 mg/dL 9.7   Total Protein      6.0 - 8.4 g/dL 8.1   Albumin      3.5 - 5.2 g/dL 3.0 (L)   Total Bilirubin      0.1 - 1.0 mg/dL 1.5 (H)   Alkaline Phosphatase      55 - 135 U/L 87   AST      10 - 40 U/L 63 (H)   ALT      10 - 44 U/L 15   Anion Gap      8 - 16 mmol/L 10   eGFR if African American      >60 mL/min/1.73 m:2 45.7 (A)   eGFR if non African American      >60 mL/min/1.73 m:2 39.7 (A)   LD      110 - 260 U/L 1,130 (H)   Uric Acid      2.4 - 5.7 mg/dL 4.7   Magnesium      1.6 - 2.6 mg/dL 1.7         2/21/18 Bone marrow biopsy NGS result     "1. ASXL1: Chr20(GRCh37):g.[id number]_31022364insTA; NM_015338.5(ASXL1):c.1848_1849insTA; p.Gwy141*  (45%) 2. BCOR: ChrX(GRCh37):g.35397290C>T; NM_001123385.1(BCOR):c.867G>A; p.Oxe759* (37%)  3. JAK2: Chr9(GRCh37):g.5676615N>T; NM_004972.3(JAK2):c.1849G>T; p.Iar956Nee (45%)  4. NRAS: Chr1(GRCh37):g.835930222U>T; NM_002524.4(NRAS):c.35G>A; p.Jmf45Xwv (38%)  5. RUNX1: Chr21(GRCh37):g.60826293T>C; NM_001001890.2(RUNX1):c.512A>G; p.Xgt875Dlg (40%)  6. TET2: Chr4(GRCh37):g.814197418hjs; " NM_001127208.2(TET2):c.987del; p.Pdv215Iodcy*18 (43%) Note:     2/21/18 bone marrow biopsy: FINAL PATHOLOGIC DIAGNOSIS  BONE MARROW ASPIRATE SMEARS, TOUCH IMPRINTS, CORE BIOPSY, LEFT ILIAC CREST, AND CLOT SECTION:  --Persistent myeloproliferative neoplasm, see comment.  --Stainable iron is present.  COMMENT: The core biopsy is hypercellular for age (80%). Blasts are not increased by morphology, by immunohistochemistry, or in the corresponding flow cytometric analysis (please see separate report). The majority  of the cellularity is composed of erythroid elements, which demonstrate a shift towards immaturity and exhibit cytologic atypia. Megakaryocytes appear mildly increased and many appear small/hypolobated. Special stain for  reticulin demonstrates a moderate increase in reticulin fibrosis (MF-2).     Assessment:     1.Ms. Hammer has JAK2 V617F myeloproliferative disorder with acceleration and a rapidly rising WBC in late 2015 with falling hemoglobin. A repeat marrow on 1/26/16 showed a 100% cellular marrow with increased megakaryocytes and storage iron and only occasional blasts on CD34 staining (morphologic blast count 1%).  JAK2 V617F mutation was again detected without CALR or Mpl exon 10 mutations.     Hence, she was started on Ruxolitinib 20 mg bid 2/2016.  Her WBC had been controlled since that time but all of her counts fell significantly but 10/9/17 with WBC 16K, hemoglobin 5 and platelets 81K.  Hence, she received 2U RBC; ruxolitinib was decreased to 10 mg BID with improvement in her counts and acceptable WBC. Ruxolitinib was held around new year 2018 after she was hospitalized at [facility] for likely pneumonia. Her WBC count was 71.54k on 1/15.Differential count showed predominantly granulocytes, bands. No blasts. Hydrea 1g BID was prescribed. WBC 54.6 k on 1/25/18. WBC count 79.8 on 2/5/18. Jakafi was discontinued. No AML or increased blasts on bone marrow biopsy done on 2/21/18. She will continue  Hydrea 1 g daily. She will hold ASA.         2. 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 She is on Allopurinol 100mg daily. Uric acid 8.2 today.It will be increased to 100mg BID.      3. Thrombocytopenia: Platelets 16k today. She is asymptomatic.      4. Anemia:She has chronic anemia, worse today. Hemoglobin 6.8. Hydrea will be decreased to 500 mg BID.       5. Hyperglycemia: Blood glucose is 124 today. Dr. Pacheco, her primary care attending, will continue to follow up on her diabetes.     6. FELICE: Serum creatinine 1.3 on 3/7/18. Cr 1.4 today. No clear evidence of tumor lysis. Will follow electrolytes, renal function weekly.      7. Anorexia: She is on  Megace.      8. Hypokalemia: Possibly secondary to HCTZ. She will start oral potassium      Plan:     1. Decrease Hydrea to 500 mg daily. Switching back to Jakafi is an option,a s she has significant constitutional symptoms. However, she had very high WBC count with Jakafi.     2. CBC weekly  3.Continue Allopurinol 100mg BID  4. 2 unita PRBC today  5. Start potassium chloride 40mEq daily  6. F/u in 3 weeks    Distress Screening Results: Psychosocial Distress screening score of Distress Score: 0 noted and reviewed. No intervention indicated.

## 2018-04-26 ENCOUNTER — TELEPHONE (OUTPATIENT)
Dept: HEMATOLOGY/ONCOLOGY | Facility: CLINIC | Age: 78
End: 2018-04-26

## 2018-04-26 NOTE — TELEPHONE ENCOUNTER
----- Message from Penelope Veliz sent at 4/26/2018  2:55 PM CDT -----  Contact: Vi with home health  Vi calling regarding pt most recent lab results      Vi call back number [phone]   Spoke with Vi, pt's labs were discussed. No need for transfusions.  Zina

## 2018-05-02 ENCOUNTER — TELEPHONE (OUTPATIENT)
Dept: HEMATOLOGY/ONCOLOGY | Facility: CLINIC | Age: 78
End: 2018-05-02

## 2018-05-02 NOTE — TELEPHONE ENCOUNTER
----- Message from Tashia Novak sent at 5/2/2018  7:37 AM CDT -----  Contact: Pt   Pt  called and would like to speak with Nurse about pt Blood Results    Callback#[phone]  Thank You  FERNY Novak

## 2018-05-07 ENCOUNTER — TELEPHONE (OUTPATIENT)
Dept: HEMATOLOGY/ONCOLOGY | Facility: CLINIC | Age: 78
End: 2018-05-07

## 2018-05-07 NOTE — TELEPHONE ENCOUNTER
Spoke to pt's , stated pt has been feeling weak and confused, Dr. Parmar has reviewed her labs and recommends the pt goes to the nearest ED,  verbalized understanding.  Zina

## 2018-05-11 ENCOUNTER — OFFICE VISIT (OUTPATIENT)
Dept: HEMATOLOGY/ONCOLOGY | Facility: CLINIC | Age: 78
End: 2018-05-11
Payer: MEDICARE

## 2018-05-11 ENCOUNTER — INFUSION (OUTPATIENT)
Dept: INFUSION THERAPY | Facility: HOSPITAL | Age: 78
End: 2018-05-11
Attending: INTERNAL MEDICINE
Payer: MEDICARE

## 2018-05-11 ENCOUNTER — TELEPHONE (OUTPATIENT)
Dept: PHARMACY | Facility: CLINIC | Age: 78
End: 2018-05-11

## 2018-05-11 ENCOUNTER — TELEPHONE (OUTPATIENT)
Dept: HEMATOLOGY/ONCOLOGY | Facility: CLINIC | Age: 78
End: 2018-05-11

## 2018-05-11 VITALS
OXYGEN SATURATION: 96 % | RESPIRATION RATE: 17 BRPM | BODY MASS INDEX: 17.35 KG/M2 | SYSTOLIC BLOOD PRESSURE: 119 MMHG | TEMPERATURE: 99 F | WEIGHT: 88.38 LBS | HEART RATE: 131 BPM | DIASTOLIC BLOOD PRESSURE: 56 MMHG | HEIGHT: 60 IN

## 2018-05-11 DIAGNOSIS — D72.828 OTHER ELEVATED WHITE BLOOD CELL (WBC) COUNT: ICD-10-CM

## 2018-05-11 DIAGNOSIS — N18.30 CKD (CHRONIC KIDNEY DISEASE) STAGE 3, GFR 30-59 ML/MIN: ICD-10-CM

## 2018-05-11 DIAGNOSIS — E79.0 HYPERURICEMIA: ICD-10-CM

## 2018-05-11 DIAGNOSIS — R53.81 PHYSICAL DEBILITY: ICD-10-CM

## 2018-05-11 DIAGNOSIS — D47.1 MYELOPROLIFERATIVE NEOPLASM: ICD-10-CM

## 2018-05-11 DIAGNOSIS — D64.81 ANEMIA DUE TO ANTINEOPLASTIC CHEMOTHERAPY: ICD-10-CM

## 2018-05-11 DIAGNOSIS — T45.1X5A ANEMIA DUE TO ANTINEOPLASTIC CHEMOTHERAPY: ICD-10-CM

## 2018-05-11 DIAGNOSIS — D69.6 THROMBOCYTOPENIA: ICD-10-CM

## 2018-05-11 DIAGNOSIS — D47.1 MYELOPROLIFERATIVE NEOPLASM: Primary | ICD-10-CM

## 2018-05-11 PROCEDURE — P9040 RBC LEUKOREDUCED IRRADIATED: HCPCS

## 2018-05-11 PROCEDURE — 99214 OFFICE O/P EST MOD 30 MIN: CPT | Mod: S$PBB,,, | Performed by: INTERNAL MEDICINE

## 2018-05-11 PROCEDURE — 86920 COMPATIBILITY TEST SPIN: CPT

## 2018-05-11 PROCEDURE — 99999 PR PBB SHADOW E&M-EST. PATIENT-LVL III: CPT | Mod: PBBFAC,,, | Performed by: INTERNAL MEDICINE

## 2018-05-11 PROCEDURE — 36430 TRANSFUSION BLD/BLD COMPNT: CPT

## 2018-05-11 PROCEDURE — 99213 OFFICE O/P EST LOW 20 MIN: CPT | Mod: PBBFAC,25 | Performed by: INTERNAL MEDICINE

## 2018-05-11 RX ORDER — HYDROCODONE BITARTRATE AND ACETAMINOPHEN 500; 5 MG/1; MG/1
TABLET ORAL ONCE
Status: CANCELLED | OUTPATIENT
Start: 2018-05-11 | End: 2018-05-11

## 2018-05-11 RX ORDER — CYPROHEPTADINE HYDROCHLORIDE 4 MG/1
4 TABLET ORAL 3 TIMES DAILY PRN
Qty: 90 TABLET | Refills: 2 | Status: ON HOLD | OUTPATIENT
Start: 2018-05-11 | End: 2018-06-05 | Stop reason: HOSPADM

## 2018-05-11 RX ORDER — HYDROCODONE BITARTRATE AND ACETAMINOPHEN 500; 5 MG/1; MG/1
TABLET ORAL ONCE
Status: DISCONTINUED | OUTPATIENT
Start: 2018-05-11 | End: 2018-05-14 | Stop reason: HOSPADM

## 2018-05-11 NOTE — TELEPHONE ENCOUNTER
[facility] received prescription for Jakafi 5mg twice daily.  Prior authorization is required.

## 2018-05-11 NOTE — PROGRESS NOTES
CC: Myelofibrosis, here for follow up visit           HPI: Mrs. 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 She had not required a blood transfusion since 3/2016 but she was transfused 3 U PRBC on 10/9/17-11/20/17.     No fever or other new problems. She is very weak. She has limited mobility due to weakness.      History:  She was first noted to have an elevated platelet count in early 2011 with CBC 6/7/11 showing WBC 56.9K, Hb 12.0, Plt 1090K (74P, 21L).  On 7/8/11 she had a marrow biopsy showing 90% cellularity with megakaryocyte hyperplasia with dysmorphic features. Myeloid hyperplasia with left shift. No lymphoid infiltrate or increased blasts. Flow cytometry showed virtually no B cells but no other concerning features. Cytogentics 46,XY[20]. Bcr-abl negative. Testing for Jak2 V617F on 7/7/15 was positive in 5% of total Jak2 DNA. Additional testing that same day for CalR and Mpl exon 10 mutation were both negative. Hence, she was diagnosed with Jak2+ ET.     She was started on hydroxyurea and ASA for essential thrombocythemia and had been stable on this until she began to require blood transfusions early in 2015. Her RBC transfusion needs slowly increased. She was continued on 1500 mg hydroxyurea, her same dose for many years. Her transfusion needs increased along with an alarming increase in her WBC.     CBC 6/18/15 WBC 31.8K, hemoglobin 4.4, platelets 212K. She received 4U PRBC and CBC on 6/30 WBC 23K, hemoglobin 10.3, platelets 97K.  A repeat marrow on 1/26/16 showed a 100% cellular marrow with increased megakaryocytes and storage iron and only occasional blasts on CD34 staining (morphologic blast count 1%).  Jak2 V617F mutation was again detected without CALR or MPL exon 10 mutations.     Hence, she was started on Ruxolitinib 20 mg bid 2/2015.  This was cut to 10 mg bid as of 10/9/17. She was hospitalized for pneumonia just before new year ( Dec 2017). It has  since been stopped due to persistently rising WBC count.  She was hospitalized in late February 2018 for confusion. She had a bone marrow biopsy on 2/21/18. There was no AML or increased blasts. The marrow was very hypercellular.          Interval History: She is here for a follow up visit. . She is now on Hydrea 1000 mg daily. She continues to be very weak, fatigued, and confused. She has poor appetite.       Review of Systems   Constitutional: Positive for malaise/fatigue and weight loss. Negative for chills and fever.   HENT: Negative.    Eyes: Negative.    Respiratory: Negative.    Cardiovascular: Negative.    Gastrointestinal: Negative.    Genitourinary: Negative.    Musculoskeletal: Negative.    Neurological: Positive for weakness. Negative for dizziness, tremors, sensory change, speech change, focal weakness and headaches.   Endo/Heme/Allergies: Negative.    Psychiatric/Behavioral: Positive for memory loss. Negative for depression and suicidal ideas. The patient is not nervous/anxious and does not have insomnia.         Vitals:    05/11/18 1138   BP: (!) 119/56   Pulse: (!) 131   Resp: 17   Temp: 98.9 °F (37.2 °C)     Physical Exam   Constitutional:   She looks cachectic   HENT:   Head: Normocephalic and atraumatic.   Mouth/Throat: No oropharyngeal exudate.   Cardiovascular:   She is tachycardic   Pulmonary/Chest: Effort normal and breath sounds normal. No respiratory distress. She has no wheezes.   Abdominal: She exhibits distension and mass.   She has massive splenomegaly, non-tender   Lymphadenopathy:     She has no cervical adenopathy.   Neurological: She is alert.   She is in a wheel chair   Skin: Skin is warm.     Component      Latest Ref Rng & Units 5/11/2018   WBC      3.90 - 12.70 K/uL 156.10 (HH)   RBC      4.00 - 5.40 M/uL 2.18 (L)   Hemoglobin      12.0 - 16.0 g/dL 6.2 (L)   Hematocrit      37.0 - 48.5 % 19.7 (LL)   MCV      82 - 98 fL 90   MCH      27.0 - 31.0 pg 28.4   MCHC      32.0 - 36.0  "g/dL 31.5 (L)   RDW      11.5 - 14.5 % 19.0 (H)   Platelets      150 - 350 K/uL 46 (L)   MPV      9.2 - 12.9 fL SEE COMMENT   Immature Granulocytes      0.0 - 0.5 % Test Not Performed   Immature Grans (Abs)      0.00 - 0.04 K/uL Test Not Performed   nRBC      0 /100 WBC 4 (A)   Gran%      38.0 - 73.0 % 73.5 (H)   Lymph%      18.0 - 48.0 % 7.5 (L)   Mono%      4.0 - 15.0 % 4.5   Eosinophil%      0.0 - 8.0 % 0.0   Basophil%      0.0 - 1.9 % 0.0   BANDS      % 3.5   Metamyelocytes      % 2.0   Myelocytes      % 8.5   Promyelocytes      % 0.5   Platelet Estimate       Decreased (A)   Aniso       Moderate   Poik       Slight   Poly       Occasional   Hypo       Occasional   Ovalocytes       Occasional   Large/Giant Platelets       Present   Differential Method       Manual   Sodium      136 - 145 mmol/L 135 (L)   Potassium      3.5 - 5.1 mmol/L 4.0   Chloride      95 - 110 mmol/L 97   CO2      23 - 29 mmol/L 28   Glucose      70 - 110 mg/dL 225 (H)   BUN, Bld      8 - 23 mg/dL 38 (H)   Creatinine      0.5 - 1.4 mg/dL 1.6 (H)   Calcium      8.7 - 10.5 mg/dL 9.9   Total Protein      6.0 - 8.4 g/dL 8.1   Albumin      3.5 - 5.2 g/dL 3.1 (L)   Total Bilirubin      0.1 - 1.0 mg/dL 0.8   Alkaline Phosphatase      55 - 135 U/L 120   AST      10 - 40 U/L 68 (H)   ALT      10 - 44 U/L 21   Anion Gap      8 - 16 mmol/L 10   eGFR if African American      >60 mL/min/1.73 m:2 35.6 (A)   eGFR if non African American      >60 mL/min/1.73 m:2 30.9 (A)   LD      110 - 260 U/L 1,148 (H)   Uric Acid      2.4 - 5.7 mg/dL 5.8 (H)            2/21/18 Bone marrow biopsy NGS result     "1. ASXL1: Chr20(GRCh37):g.31022363_31022364insTA; NM_015338.5(ASXL1):c.1848_1849insTA; p.Vlc082*  (45%) 2. BCOR: ChrX(GRCh37):g.47259223J>T; NM_001123385.1(BCOR):c.867G>A; p.Fxy728* (37%)  3. JAK2: Chr9(GRCh37):g.7234567R>T; NM_004972.3(JAK2):c.1849G>T; p.Wbb789Raa (45%)  4. NRAS: Chr1(GRCh37):g.824845710X>T; NM_002524.4(NRAS):c.35G>A; p.Win02Kvj (38%)  5. RUNX1: " Chr21(GRCh37):g.63676357I>C; NM_001001890.2(RUNX1):c.512A>G; p.Guh213Ukc (40%)  6. TET2: Chr4(GRCh37):g.617225033rys; NM_001127208.2(TET2):c.987del; p.Mac047Izdkz*18 (43%) Note:     2/21/18 bone marrow biopsy: FINAL PATHOLOGIC DIAGNOSIS  BONE MARROW ASPIRATE SMEARS, TOUCH IMPRINTS, CORE BIOPSY, LEFT ILIAC CREST, AND CLOT SECTION:  --Persistent myeloproliferative neoplasm, see comment.  --Stainable iron is present.  COMMENT: The core biopsy is hypercellular for age (80%). Blasts are not increased by morphology, by immunohistochemistry, or in the corresponding flow cytometric analysis (please see separate report). The majority  of the cellularity is composed of erythroid elements, which demonstrate a shift towards immaturity and exhibit cytologic atypia. Megakaryocytes appear mildly increased and many appear small/hypolobated. Special stain for  reticulin demonstrates a moderate increase in reticulin fibrosis (MF-2).      Assessment:     1.Ms. Hammer has JAK2 V617F myeloproliferative disorder with acceleration and a rapidly rising WBC in late 2015 with falling hemoglobin. A repeat marrow on 1/26/16 showed a 100% cellular marrow with increased megakaryocytes and storage iron and only occasional blasts on CD34 staining (morphologic blast count 1%).  JAK2 V617F mutation was again detected without CALR or Mpl exon 10 mutations.     Hence, she was started on Ruxolitinib 20 mg bid 2/2016.  Her WBC had been controlled since that time but all of her counts fell significantly but 10/9/17 with WBC 16K, hemoglobin 5 and platelets 81K.  Hence, she received 2U RBC; ruxolitinib was decreased to 10 mg BID with improvement in her counts and acceptable WBC. Ruxolitinib was held around new year 2018 after she was hospitalized at [facility] for likely pneumonia. Her WBC count was 71.54k on 1/15.Differential count showed predominantly granulocytes, bands. No blasts. Hydrea 1g BID was prescribed. WBC 54.6 k on 1/25/18. WBC count 79.8  on 2/5/18. Jakafi was discontinued. No AML or increased blasts on bone marrow biopsy done on 2/21/18. WBC count today 156k. No blasts in peripheral blood.  She is on Hydrea 1g BID. However, her WBC count has increased to 156k. SHe will try Jakafi again, 5mg/d as she has significant splenomegaly and constitutional symptoms. Splenic radiation is an option if Jakafi is again poorly tolerated.         2. 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 She is on Allopurinol 100mg daily. Uric acid 8.2 today.It will be increased to 100mg BID.      3. Thrombocytopenia: Platelets 46 k today. She is asymptomatic.      4. Anemia:She has chronic anemia, worse today. Hemoglobin 6.8. Hydrea will be decreased to 500 mg BID.       5. Hyperglycemia: Blood glucose is 225 today. Dr. Pacheco, her primary care attending, will continue to follow up on her diabetes.     6. FELICE: Serum creatinine 1.3 on 3/7/18. Cr 1.6 today. No clear evidence of tumor lysis. Will follow electrolytes, renal function weekly.      7. Anorexia: She is on  Megace.      8. Hypokalemia: Possibly secondary to HCTZ. She is on oral potassium. K 4.0 today.      Plan:     1. Continue Hydrea 1g daily. Switching back to Jakafi ( 5mg/d) is an option,as she has significant constitutional symptoms. However, she had very high WBC count with Jakafi.     2. CBC twice weekly  3.Continue Allopurinol 100mg BID  4. 2 units PRBC today  5. Continue potassium chloride 40mEq daily  6. F/u in 2 weeks    Distress Screening Results: Psychosocial Distress screening score of Distress Score: 0 noted and reviewed. No intervention indicated.

## 2018-05-11 NOTE — NURSING
Temperature 99.1 after first unit of blood and 99.8 after 15 minutes of second.  Notified Zina Fisher RN at Dr. Parmar's office who stated she would speak to Dr. Parmar.

## 2018-05-11 NOTE — PLAN OF CARE
Problem: Anemia (Adult)  Goal: Identify Related Risk Factors and Signs and Symptoms  Related risk factors and signs and symptoms are identified upon initiation of Human Response Clinical Practice Guideline (CPG)   Outcome: Ongoing (interventions implemented as appropriate)  Patient here for 2 units PRBC.  Assessment complete and labs reviewed.  VSS.  Chair reclined and blanket offered.  No needs expressed at this time.  Will continue to monitor.

## 2018-05-14 VITALS
RESPIRATION RATE: 16 BRPM | SYSTOLIC BLOOD PRESSURE: 109 MMHG | OXYGEN SATURATION: 96 % | HEART RATE: 119 BPM | TEMPERATURE: 98 F | DIASTOLIC BLOOD PRESSURE: 64 MMHG

## 2018-05-18 ENCOUNTER — TELEPHONE (OUTPATIENT)
Dept: PHARMACY | Facility: CLINIC | Age: 78
End: 2018-05-18

## 2018-05-18 NOTE — TELEPHONE ENCOUNTER
Patient's  confirmed dose change and restart of Jakafi 5mg.  He states that Dr Parmar told him to give patient 5mg twice daily.    Jakafi will be shipped on 5/21/2018 to arrive at patient's home on 5/22/2018 via [employer]. $0.00copay.  Patient plans to start Jakafi on 5/22/2018.

## 2018-05-21 ENCOUNTER — TELEPHONE (OUTPATIENT)
Dept: HEMATOLOGY/ONCOLOGY | Facility: CLINIC | Age: 78
End: 2018-05-21

## 2018-05-21 NOTE — TELEPHONE ENCOUNTER
Spoke to pt's spouse regarding his concerns about pt's Jakafi. Informed Spouse Dr. Parmar ordered Jakafi 5 mg orally twice a day. Pt's spouse stated he has not received med from Pharmacy due to pending PA. Pt should receive med on Mon orTuesday. Spouse stated Home Health Nurse stated pt's recent WBC was much higher than previous WBC. Spouse is concerned and is wondering if wife she start Jakafi when it arrives or wait until her appt with Dr. Parmar. I informed pt I will let Dr. Parmar know. I also suggested that  nurse fax over lab results to [phone]. Spouse voiced understanding.

## 2018-05-21 NOTE — TELEPHONE ENCOUNTER
----- Message from Derrick Almeida sent at 5/21/2018  1:55 PM CDT -----  Contact: Spouse   Will like a call in regards to Rx change and never receiving the med    Spouse states pt's white blood cell has decreased     Contact::[phone]

## 2018-05-25 ENCOUNTER — INFUSION (OUTPATIENT)
Dept: INFUSION THERAPY | Facility: HOSPITAL | Age: 78
End: 2018-05-25
Attending: INTERNAL MEDICINE
Payer: MEDICARE

## 2018-05-25 ENCOUNTER — OFFICE VISIT (OUTPATIENT)
Dept: HEMATOLOGY/ONCOLOGY | Facility: CLINIC | Age: 78
End: 2018-05-25
Payer: MEDICARE

## 2018-05-25 VITALS
SYSTOLIC BLOOD PRESSURE: 116 MMHG | TEMPERATURE: 100 F | OXYGEN SATURATION: 97 % | HEART RATE: 124 BPM | RESPIRATION RATE: 20 BRPM | DIASTOLIC BLOOD PRESSURE: 58 MMHG

## 2018-05-25 VITALS
WEIGHT: 88.38 LBS | BODY MASS INDEX: 16.26 KG/M2 | TEMPERATURE: 100 F | HEIGHT: 62 IN | DIASTOLIC BLOOD PRESSURE: 60 MMHG | SYSTOLIC BLOOD PRESSURE: 129 MMHG | HEART RATE: 130 BPM

## 2018-05-25 DIAGNOSIS — N18.30 CKD (CHRONIC KIDNEY DISEASE) STAGE 3, GFR 30-59 ML/MIN: ICD-10-CM

## 2018-05-25 DIAGNOSIS — D64.9 SYMPTOMATIC ANEMIA: ICD-10-CM

## 2018-05-25 DIAGNOSIS — D64.9 SYMPTOMATIC ANEMIA: Primary | ICD-10-CM

## 2018-05-25 DIAGNOSIS — D47.1 MYELOPROLIFERATIVE DISORDER: ICD-10-CM

## 2018-05-25 PROCEDURE — P9040 RBC LEUKOREDUCED IRRADIATED: HCPCS

## 2018-05-25 PROCEDURE — 25000003 PHARM REV CODE 250: Performed by: INTERNAL MEDICINE

## 2018-05-25 PROCEDURE — 99213 OFFICE O/P EST LOW 20 MIN: CPT | Mod: PBBFAC,25 | Performed by: INTERNAL MEDICINE

## 2018-05-25 PROCEDURE — 99215 OFFICE O/P EST HI 40 MIN: CPT | Mod: S$PBB,,, | Performed by: INTERNAL MEDICINE

## 2018-05-25 PROCEDURE — 86920 COMPATIBILITY TEST SPIN: CPT

## 2018-05-25 PROCEDURE — 36430 TRANSFUSION BLD/BLD COMPNT: CPT

## 2018-05-25 PROCEDURE — 99999 PR PBB SHADOW E&M-EST. PATIENT-LVL III: CPT | Mod: PBBFAC,,, | Performed by: INTERNAL MEDICINE

## 2018-05-25 RX ORDER — DIPHENHYDRAMINE HCL 25 MG
25 CAPSULE ORAL
Status: CANCELLED | OUTPATIENT
Start: 2018-05-25

## 2018-05-25 RX ORDER — DIPHENHYDRAMINE HCL 25 MG
25 CAPSULE ORAL
Status: COMPLETED | OUTPATIENT
Start: 2018-05-25 | End: 2018-05-25

## 2018-05-25 RX ORDER — ACETAMINOPHEN 325 MG/1
650 TABLET ORAL
Status: COMPLETED | OUTPATIENT
Start: 2018-05-25 | End: 2018-05-25

## 2018-05-25 RX ORDER — ACETAMINOPHEN 325 MG/1
650 TABLET ORAL
Status: CANCELLED | OUTPATIENT
Start: 2018-05-25

## 2018-05-25 RX ORDER — HYDROCODONE BITARTRATE AND ACETAMINOPHEN 500; 5 MG/1; MG/1
TABLET ORAL ONCE
Status: DISCONTINUED | OUTPATIENT
Start: 2018-05-25 | End: 2018-05-25 | Stop reason: HOSPADM

## 2018-05-25 RX ORDER — HYDROCODONE BITARTRATE AND ACETAMINOPHEN 500; 5 MG/1; MG/1
TABLET ORAL ONCE
Status: CANCELLED | OUTPATIENT
Start: 2018-05-25 | End: 2018-05-25

## 2018-05-25 RX ADMIN — ACETAMINOPHEN 650 MG: 325 TABLET, FILM COATED ORAL at 03:05

## 2018-05-25 RX ADMIN — DIPHENHYDRAMINE HYDROCHLORIDE 25 MG: 25 CAPSULE ORAL at 03:05

## 2018-05-25 NOTE — PROGRESS NOTES
CC: Myelofibrosis, here for follow up visit           HPI: Mrs. 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 She had not required a blood transfusion since 3/2016 but she was transfused 3 U PRBC on 10/9/17-11/20/17.     No fever or other new problems. She is very weak. She has limited mobility due to weakness.      History:  She was first noted to have an elevated platelet count in early 2011 with CBC 6/7/11 showing WBC 56.9K, Hb 12.0, Plt 1090K (74P, 21L).  On 7/8/11 she had a marrow biopsy showing 90% cellularity with megakaryocyte hyperplasia with dysmorphic features. Myeloid hyperplasia with left shift. No lymphoid infiltrate or increased blasts. Flow cytometry showed virtually no B cells but no other concerning features. Cytogent[employer] 46,XY[20]. Bcr-abl negative. Testing for Jak2 V617F on 7/7/15 was positive in 5% of total Jak2 DNA. Additional testing that same day for CalR and Mpl exon 10 mutation were both negative. Hence, she was diagnosed with Jak2+ ET.     She was started on hydroxyurea and ASA for essential thrombocythemia and had been stable on this until she began to require blood transfusions early in 2015. Her RBC transfusion needs slowly increased. She was continued on 1500 mg hydroxyurea, her same dose for many years. Her transfusion needs increased along with an alarming increase in her WBC.     CBC 6/18/15 WBC 31.8K, hemoglobin 4.4, platelets 212K. She received 4U PRBC and CBC on 6/30 WBC 23K, hemoglobin 10.3, platelets 97K.  A repeat marrow on 1/26/16 showed a 100% cellular marrow with increased megakaryocytes and storage iron and only occasional blasts on CD34 staining (morphologic blast count 1%).  Jak2 V617F mutation was again detected without CALR or MPL exon 10 mutations.     Hence, she was started on Ruxolitinib 20 mg bid 2/2015.  This was cut to 10 mg bid as of 10/9/17. She was hospitalized for pneumonia just before new year ( Dec 2017). It has  since been stopped due to persistently rising WBC count.  She was hospitalized in late February 2018 for confusion. She had a bone marrow biopsy on 2/21/18. There was no AML or increased blasts. The marrow was very hypercellular.          Interval History: Mrs. Hammer presents today with her daughter and  for follow up. Since the last visit she transitioned from hydrea to jakafi.She continues to be very weak, fatigued, and confused. She is non verbal, all history come from the family, but she does follow commands.       Review of Systems   Constitutional: Positive for malaise/fatigue and weight loss. Negative for chills and fever.   HENT: Negative.    Eyes: Negative.    Respiratory: Negative.    Cardiovascular: Negative.    Gastrointestinal: Negative.    Genitourinary: Negative.    Musculoskeletal: Negative.    Neurological: Positive for weakness. Negative for dizziness, tremors, sensory change, speech change, focal weakness and headaches.   Endo/Heme/Allergies: Negative.    Psychiatric/Behavioral: Positive for memory loss. Negative for depression and suicidal ideas. The patient is not nervous/anxious and does not have insomnia.      Vitals:    05/25/18 1450   BP: 129/60   Pulse: (!) 130   Temp: 100.2 °F (37.9 °C)        Physical Exam   Constitutional:   She looks cachectic   HENT:   Head: Normocephalic and atraumatic.   Mouth/Throat: No oropharyngeal exudate.   Cardiovascular:   She is tachycardic   Pulmonary/Chest: Effort normal and breath sounds normal. No respiratory distress. She has no wheezes.   Abdominal: She exhibits distension and mass.   She has massive splenomegaly, non-tender   Lymphadenopathy:     She has no cervical adenopathy.   Neurological: She is alert.   She is in a wheel chair   Skin: Skin is warm.     Lab Results   Component Value Date    .30 (HH) 05/25/2018    HGB 6.5 (L) 05/25/2018    HCT 20.8 (L) 05/25/2018    PLT 59 (L) 05/25/2018    ALT 20 05/25/2018    AST 66 (H) 05/25/2018  "    05/25/2018    K 3.7 05/25/2018     05/25/2018    CREATININE 1.7 (H) 05/25/2018    BUN 49 (H) 05/25/2018    CO2 26 05/25/2018    INR 1.1 02/22/2018    HGBA1C 5.4 01/02/2018 2/21/18 Bone marrow biopsy NGS result     "1. ASXL1: Chr20(GRCh37):g.31022363_31022364insTA; NM_015338.5(ASXL1):c.1848_1849insTA; p.Scm772*  (45%) 2. BCOR: ChrX(GRCh37):g.48097324H>T; NM_001123385.1(BCOR):c.867G>A; p.Kla065* (37%)  3. JAK2: Chr9(GRCh37):g.2043680J>T; NM_004972.3(JAK2):c.1849G>T; p.Srs347Cap (45%)  4. NRAS: Chr1(GRCh37):g.459731523H>T; NM_002524.4(NRAS):c.35G>A; p.Egi74Kbh (38%)  5. RUNX1: Chr21(GRCh37):g.78917729F>C; NM_001001890.2(RUNX1):c.512A>G; p.Uik623Agv (40%)  6. TET2: Chr4(GRCh37):g.253861756lmp; NM_001127208.2(TET2):c.987del; p.Bpp744Fucbw*18 (43%) Note:     2/21/18 bone marrow biopsy: FINAL PATHOLOGIC DIAGNOSIS  BONE MARROW ASPIRATE SMEARS, TOUCH IMPRINTS, CORE BIOPSY, LEFT ILIAC CREST, AND CLOT SECTION:  --Persistent myeloproliferative neoplasm, see comment.  --Stainable iron is present.  COMMENT: The core biopsy is hypercellular for age (80%). Blasts are not increased by morphology, by immunohistochemistry, or in the corresponding flow cytometric analysis (please see separate report). The majority  of the cellularity is composed of erythroid elements, which demonstrate a shift towards immaturity and exhibit cytologic atypia. Megakaryocytes appear mildly increased and many appear small/hypolobated. Special stain for  reticulin demonstrates a moderate increase in reticulin fibrosis (MF-2).      Assessment:     1.Ms. Hammer has JAK2 V617F myeloproliferative disorder with acceleration and a rapidly rising WBC in late 2015 with falling hemoglobin. A repeat marrow on 1/26/16 showed a 100% cellular marrow with increased megakaryocytes and storage iron and only occasional blasts on CD34 staining (morphologic blast count 1%).  JAK2 V617F mutation was again detected without CALR or Mpl exon 10 " mutations.     Hence, she was started on Ruxolitinib 20 mg bid 2/2016.  Her WBC had been controlled since that time but all of her counts fell significantly but 10/9/17 with WBC 16K, hemoglobin 5 and platelets 81K.  Hence, she received 2U RBC; ruxolitinib was decreased to 10 mg BID with improvement in her counts and acceptable WBC. Ruxolitinib was held around new year 2018 after she was hospitalized at [facility] for likely pneumonia. Her WBC count was 71.54k on 1/15.Differential count showed predominantly granulocytes, bands. No blasts. Hydrea 1g BID was prescribed. WBC 54.6 k on 1/25/18. WBC count 79.8 on 2/5/18. Jakafi was discontinued. No AML or increased blasts on bone marrow biopsy done on 2/21/18. WBC count today 156k. No blasts in peripheral blood.  --WBC count increased to 156k on Hydrea 1g BID  --At the last visit she was transitioned to Jakafi 5mg/d as she has significant splenomegaly and constitutional symptoms.   --Family is confused regarding what medications the patient should be taking  --Splenic radiation is an option if Jakafi is again poorly tolerated.         2. 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 She is on Allopurinol 100mg daily. Uric acid 8.2 today.It will be increased to 100mg BID.      3. Thrombocytopenia: Platelets 59 k today. She is asymptomatic.      4. Anemia:She has chronic anemia, worse today. Hemoglobin 6.5  --Will receive 1 unit of blood today and continue home health      5. Hyperglycemia: Blood glucose is 225 today. Dr. Pacheco, her primary care attending, will continue to follow up on her diabetes.     6. FELICE: Serum creatinine 1.7 today up from 1/6 at last visit  --No clear evidence of tumor lysis.  --Will follow electrolytes, renal function weekly.      7. Anorexia: She is on  Megace.      8. Hypokalemia: Possibly secondary to HCTZ. She is on oral potassium. K 4.0 today.      Plan:     1. Currently on Jakafi ( 5mg/d)   2. CBC twice weekly  3.Continue Allopurinol 100mg BID  4. 1  units PRBC today  5. Continue potassium chloride 40mEq daily  6. F/u in 1week with Dr. Parmar    Distress Screening Results: Psychosocial Distress screening score of 0   noted and reviewed. No intervention indicated.

## 2018-05-25 NOTE — PLAN OF CARE
Problem: Patient Care Overview  Goal: Plan of Care Review  Outcome: Ongoing (interventions implemented as appropriate)  9880 -- Patient tolerated 1 unit of RBC without complication.  Vital signs stable.  No apparent distress noted.  Discharged patient without s/s of adverse event.  AVS/Outpatient Blood Transfusion handout given.  Patient instructed of s/s of transfusion reaction and when  to notify the provider.  Patient verbalized understanding.

## 2018-05-25 NOTE — PLAN OF CARE
Problem: Patient Care Overview  Goal: Individualization & Mutuality  PIV   Outcome: Ongoing (interventions implemented as appropriate)  0752 -- Patient's labs, history, allergies, and medication reviewed.  Assessment complete.  Vital signs stable.  Patient to receive 1 unit of PRBC.  Discussed plan of care with patient.  Patient in agreement. Consent in Epic. Chair reclined.  Warm blanket and snack offered.  Will monitor.

## 2018-05-29 ENCOUNTER — TELEPHONE (OUTPATIENT)
Dept: PHARMACY | Facility: CLINIC | Age: 78
End: 2018-05-29

## 2018-05-29 ENCOUNTER — TELEPHONE (OUTPATIENT)
Dept: HEMATOLOGY/ONCOLOGY | Facility: CLINIC | Age: 78
End: 2018-05-29

## 2018-05-29 NOTE — TELEPHONE ENCOUNTER
Spoke with patient's  regarding concerns of patient feeling weak and wanted to report that home health service luz elena blood on Monday and wanted Dr. Parmar to review labs and and call him back with the next steps. Advised patient's  that I will see if we got the results of lab work and go over them with dr parmar and call back. Patient's  stated his number [phone]. I spoke with Zina RN to see if she reviewed the fax machine and saw any results regarding this patient. She will forward the results and conversation to Dr. Parmar and call patient's  back once she speaks with Dr. Parmar.    ---Kinga Reynolds

## 2018-05-29 NOTE — TELEPHONE ENCOUNTER
, Duy, called back and stated that patient was not doing well and was coughing and had on/off fever. Patient was advised to go to ER and is currently receiving IV fluids.

## 2018-05-29 NOTE — TELEPHONE ENCOUNTER
Spoke with pt's  this morning and explained that Dr. Parmar recommends he brings his wife to the nearest ED.  verbalized understanding.  Zina

## 2018-05-30 ENCOUNTER — TELEPHONE (OUTPATIENT)
Dept: HEMATOLOGY/ONCOLOGY | Facility: CLINIC | Age: 78
End: 2018-05-30

## 2018-05-30 NOTE — TELEPHONE ENCOUNTER
----- Message from Bebeto Parmar MD sent at 5/29/2018  5:04 PM CDT -----  Contact: pt    No. 5/31 is good.   ----- Message -----  From: Zina Fisher RN  Sent: 5/29/2018   4:12 PM  To: Bebeto Parmar MD    Spoke with pt's . Stated he brought the pt to the ED. Was instructed to call the office. Would you like to keep the appt scheduled on 05/31/18 or move up to an earlier date?  Thanks  ----- Message -----  From: Tashia Novak  Sent: 5/29/2018   3:47 PM  To: Ghulam Tate Staff    Pt  called to speak with nurse   Callback# [phone]  Thank You  FERNY Novak

## 2018-05-30 NOTE — TELEPHONE ENCOUNTER
Spoke with pt's . Explained that Dr. Parmar is okay with keeping the appointment on 05/31/18, no need to move up appt.  verbalized understanding.  Zina

## 2018-05-30 NOTE — TELEPHONE ENCOUNTER
----- Message from Penelope Veliz sent at 5/30/2018  9:33 AM CDT -----  Contact: Pt  Cristina   Pt  calling stating that pt had blood work done on yesterday in ER. He wants to know if  would be fine with the labs she had done or if pt needs to have labs done tomorrow       Duy call back number   [phone]

## 2018-05-30 NOTE — TELEPHONE ENCOUNTER
Spoke with pt's . Explained that labs will need to be done on tomorrow before clinic appt.  verbalized understanding.  Zina

## 2018-05-31 ENCOUNTER — HOSPITAL ENCOUNTER (INPATIENT)
Facility: HOSPITAL | Age: 78
LOS: 6 days | Discharge: HOSPICE | DRG: 871 | End: 2018-06-06
Attending: EMERGENCY MEDICINE | Admitting: EMERGENCY MEDICINE
Payer: MEDICARE

## 2018-05-31 DIAGNOSIS — A41.9 SEPSIS: ICD-10-CM

## 2018-05-31 DIAGNOSIS — D64.81 ANEMIA DUE TO ANTINEOPLASTIC CHEMOTHERAPY: ICD-10-CM

## 2018-05-31 DIAGNOSIS — J18.9 PNEUMONIA OF RIGHT LOWER LOBE DUE TO INFECTIOUS ORGANISM: Primary | ICD-10-CM

## 2018-05-31 DIAGNOSIS — T45.1X5A ANEMIA DUE TO ANTINEOPLASTIC CHEMOTHERAPY: ICD-10-CM

## 2018-05-31 DIAGNOSIS — A41.9 SEPSIS WITH ACUTE ORGAN DYSFUNCTION: ICD-10-CM

## 2018-05-31 DIAGNOSIS — R65.20 SEPSIS WITH ACUTE ORGAN DYSFUNCTION: ICD-10-CM

## 2018-05-31 DIAGNOSIS — R00.0 TACHYCARDIA: ICD-10-CM

## 2018-05-31 PROBLEM — N17.9 ACUTE RENAL FAILURE SUPERIMPOSED ON STAGE 3 CHRONIC KIDNEY DISEASE: Status: ACTIVE | Noted: 2018-05-31

## 2018-05-31 PROBLEM — G93.41 ENCEPHALOPATHY, METABOLIC: Status: ACTIVE | Noted: 2018-05-31

## 2018-05-31 PROBLEM — N18.30 ACUTE RENAL FAILURE SUPERIMPOSED ON STAGE 3 CHRONIC KIDNEY DISEASE: Status: ACTIVE | Noted: 2018-05-31

## 2018-05-31 LAB
ABO + RH BLD: NORMAL
ALBUMIN SERPL BCP-MCNC: 2.6 G/DL
ALLENS TEST: ABNORMAL
ALP SERPL-CCNC: 102 U/L
ALT SERPL W/O P-5'-P-CCNC: 31 U/L
AMORPH CRY UR QL COMP ASSIST: ABNORMAL
ANION GAP SERPL CALC-SCNC: 14 MMOL/L
ANISOCYTOSIS BLD QL SMEAR: SLIGHT
APTT BLDCRRT: 30.6 SEC
AST SERPL-CCNC: 90 U/L
BACTERIA #/AREA URNS AUTO: ABNORMAL /HPF
BASOPHILS # BLD AUTO: ABNORMAL K/UL
BASOPHILS NFR BLD: 0 %
BILIRUB SERPL-MCNC: 1.3 MG/DL
BILIRUB UR QL STRIP: NEGATIVE
BLD GP AB SCN CELLS X3 SERPL QL: NORMAL
BLD PROD TYP BPU: NORMAL
BLOOD UNIT EXPIRATION DATE: NORMAL
BLOOD UNIT TYPE CODE: 5100
BLOOD UNIT TYPE: NORMAL
BUN SERPL-MCNC: 43 MG/DL
CALCIUM SERPL-MCNC: 9.4 MG/DL
CHLORIDE SERPL-SCNC: 106 MMOL/L
CLARITY UR REFRACT.AUTO: ABNORMAL
CO2 SERPL-SCNC: 21 MMOL/L
CODING SYSTEM: NORMAL
COLOR UR AUTO: YELLOW
CREAT SERPL-MCNC: 2.6 MG/DL
DELSYS: ABNORMAL
DIFFERENTIAL METHOD: ABNORMAL
DISPENSE STATUS: NORMAL
EOSINOPHIL # BLD AUTO: ABNORMAL K/UL
EOSINOPHIL NFR BLD: 0 %
ERYTHROCYTE [DISTWIDTH] IN BLOOD BY AUTOMATED COUNT: 18.6 %
EST. GFR  (AFRICAN AMERICAN): 19.8 ML/MIN/1.73 M^2
EST. GFR  (NON AFRICAN AMERICAN): 17.2 ML/MIN/1.73 M^2
ESTIMATED AVG GLUCOSE: 88 MG/DL
FIBRINOGEN PPP-MCNC: 628 MG/DL
FLOW: 2
FOLATE SERPL-MCNC: 17.1 NG/ML
GLUCOSE SERPL-MCNC: 195 MG/DL
GLUCOSE UR QL STRIP: NEGATIVE
HAPTOGLOB SERPL-MCNC: 135 MG/DL
HBA1C MFR BLD HPLC: 4.7 %
HCO3 UR-SCNC: 21.3 MMOL/L (ref 24–28)
HCT VFR BLD AUTO: 19.8 %
HGB BLD-MCNC: 6.3 G/DL
HGB UR QL STRIP: NEGATIVE
HYPOCHROMIA BLD QL SMEAR: ABNORMAL
IMM GRANULOCYTES # BLD AUTO: ABNORMAL K/UL
IMM GRANULOCYTES NFR BLD AUTO: ABNORMAL %
INR PPP: 1.4
KETONES UR QL STRIP: NEGATIVE
LACTATE SERPL-SCNC: 0.8 MMOL/L
LACTATE SERPL-SCNC: 2.7 MMOL/L
LDH SERPL L TO P-CCNC: 1374 U/L
LEUKOCYTE ESTERASE UR QL STRIP: ABNORMAL
LYMPHOCYTES # BLD AUTO: ABNORMAL K/UL
LYMPHOCYTES NFR BLD: 4 %
MCH RBC QN AUTO: 28.8 PG
MCHC RBC AUTO-ENTMCNC: 31.8 G/DL
MCV RBC AUTO: 90 FL
METAMYELOCYTES NFR BLD MANUAL: 3 %
MICROSCOPIC COMMENT: ABNORMAL
MODE: ABNORMAL
MONOCYTES # BLD AUTO: ABNORMAL K/UL
MONOCYTES NFR BLD: 1 %
MYELOCYTES NFR BLD MANUAL: 11 %
NEUTROPHILS # BLD AUTO: ABNORMAL K/UL
NEUTROPHILS NFR BLD: 71 %
NEUTS BAND NFR BLD MANUAL: 1 %
NITRITE UR QL STRIP: NEGATIVE
NRBC BLD-RTO: 2 /100 WBC
NUM UNITS TRANS PACKED RBC: NORMAL
PATH REV BLD -IMP: NORMAL
PCO2 BLDA: 33.3 MMHG (ref 35–45)
PH SMN: 7.41 [PH] (ref 7.35–7.45)
PH UR STRIP: 5 [PH] (ref 5–8)
PLATELET # BLD AUTO: 70 K/UL
PLATELET BLD QL SMEAR: ABNORMAL
PMV BLD AUTO: ABNORMAL FL
PO2 BLDA: 102 MMHG (ref 80–100)
POC BE: -3 MMOL/L
POC SATURATED O2: 98 % (ref 95–100)
POC TCO2: 22 MMOL/L (ref 23–27)
POLYCHROMASIA BLD QL SMEAR: ABNORMAL
POTASSIUM SERPL-SCNC: 3.9 MMOL/L
PROCALCITONIN SERPL IA-MCNC: 5.08 NG/ML
PROMYELOCYTES NFR BLD MANUAL: 2 %
PROT SERPL-MCNC: 7.5 G/DL
PROT UR QL STRIP: NEGATIVE
PROTHROMBIN TIME: 13.7 SEC
RBC # BLD AUTO: 2.19 M/UL
RBC #/AREA URNS AUTO: 2 /HPF (ref 0–4)
RETICS/RBC NFR AUTO: 5.8 %
SAMPLE: ABNORMAL
SITE: ABNORMAL
SODIUM SERPL-SCNC: 141 MMOL/L
SP GR UR STRIP: 1.01 (ref 1–1.03)
SP02: 99
SQUAMOUS #/AREA URNS AUTO: 2 /HPF
URATE SERPL-MCNC: 7.1 MG/DL
URN SPEC COLLECT METH UR: ABNORMAL
UROBILINOGEN UR STRIP-ACNC: NEGATIVE EU/DL
VIT B12 SERPL-MCNC: >2000 PG/ML
WBC # BLD AUTO: 163.3 K/UL
WBC #/AREA URNS AUTO: 5 /HPF (ref 0–5)
WBC OTHER NFR BLD MANUAL: 7 %
YEAST UR QL AUTO: ABNORMAL

## 2018-05-31 PROCEDURE — P9040 RBC LEUKOREDUCED IRRADIATED: HCPCS

## 2018-05-31 PROCEDURE — 93005 ELECTROCARDIOGRAM TRACING: CPT

## 2018-05-31 PROCEDURE — 82746 ASSAY OF FOLIC ACID SERUM: CPT

## 2018-05-31 PROCEDURE — 99291 CRITICAL CARE FIRST HOUR: CPT | Mod: 25

## 2018-05-31 PROCEDURE — 96361 HYDRATE IV INFUSION ADD-ON: CPT

## 2018-05-31 PROCEDURE — 85045 AUTOMATED RETICULOCYTE COUNT: CPT

## 2018-05-31 PROCEDURE — 36600 WITHDRAWAL OF ARTERIAL BLOOD: CPT

## 2018-05-31 PROCEDURE — 85610 PROTHROMBIN TIME: CPT

## 2018-05-31 PROCEDURE — 99291 CRITICAL CARE FIRST HOUR: CPT | Mod: ,,, | Performed by: EMERGENCY MEDICINE

## 2018-05-31 PROCEDURE — 85007 BL SMEAR W/DIFF WBC COUNT: CPT

## 2018-05-31 PROCEDURE — 85027 COMPLETE CBC AUTOMATED: CPT

## 2018-05-31 PROCEDURE — 96375 TX/PRO/DX INJ NEW DRUG ADDON: CPT

## 2018-05-31 PROCEDURE — 96374 THER/PROPH/DIAG INJ IV PUSH: CPT

## 2018-05-31 PROCEDURE — 86901 BLOOD TYPING SEROLOGIC RH(D): CPT

## 2018-05-31 PROCEDURE — 83036 HEMOGLOBIN GLYCOSYLATED A1C: CPT

## 2018-05-31 PROCEDURE — 27000221 HC OXYGEN, UP TO 24 HOURS

## 2018-05-31 PROCEDURE — 84550 ASSAY OF BLOOD/URIC ACID: CPT

## 2018-05-31 PROCEDURE — 86920 COMPATIBILITY TEST SPIN: CPT

## 2018-05-31 PROCEDURE — 83010 ASSAY OF HAPTOGLOBIN QUANT: CPT

## 2018-05-31 PROCEDURE — 81001 URINALYSIS AUTO W/SCOPE: CPT

## 2018-05-31 PROCEDURE — 83605 ASSAY OF LACTIC ACID: CPT | Mod: 91

## 2018-05-31 PROCEDURE — 99223 1ST HOSP IP/OBS HIGH 75: CPT | Mod: GC,,, | Performed by: INTERNAL MEDICINE

## 2018-05-31 PROCEDURE — 63600175 PHARM REV CODE 636 W HCPCS: Performed by: EMERGENCY MEDICINE

## 2018-05-31 PROCEDURE — 87040 BLOOD CULTURE FOR BACTERIA: CPT | Mod: 59

## 2018-05-31 PROCEDURE — 82803 BLOOD GASES ANY COMBINATION: CPT

## 2018-05-31 PROCEDURE — 63600175 PHARM REV CODE 636 W HCPCS: Performed by: HOSPITALIST

## 2018-05-31 PROCEDURE — 93010 ELECTROCARDIOGRAM REPORT: CPT | Mod: ,,, | Performed by: INTERNAL MEDICINE

## 2018-05-31 PROCEDURE — 85384 FIBRINOGEN ACTIVITY: CPT

## 2018-05-31 PROCEDURE — 99900035 HC TECH TIME PER 15 MIN (STAT)

## 2018-05-31 PROCEDURE — 25000003 PHARM REV CODE 250: Performed by: HOSPITALIST

## 2018-05-31 PROCEDURE — 83615 LACTATE (LD) (LDH) ENZYME: CPT

## 2018-05-31 PROCEDURE — 99223 1ST HOSP IP/OBS HIGH 75: CPT | Mod: ,,, | Performed by: INTERNAL MEDICINE

## 2018-05-31 PROCEDURE — 80053 COMPREHEN METABOLIC PANEL: CPT

## 2018-05-31 PROCEDURE — 25000003 PHARM REV CODE 250: Performed by: INTERNAL MEDICINE

## 2018-05-31 PROCEDURE — 36430 TRANSFUSION BLD/BLD COMPNT: CPT

## 2018-05-31 PROCEDURE — 82607 VITAMIN B-12: CPT

## 2018-05-31 PROCEDURE — 94761 N-INVAS EAR/PLS OXIMETRY MLT: CPT

## 2018-05-31 PROCEDURE — 86644 CMV ANTIBODY: CPT

## 2018-05-31 PROCEDURE — 84145 PROCALCITONIN (PCT): CPT

## 2018-05-31 PROCEDURE — 20600001 HC STEP DOWN PRIVATE ROOM

## 2018-05-31 PROCEDURE — 25000003 PHARM REV CODE 250: Performed by: EMERGENCY MEDICINE

## 2018-05-31 PROCEDURE — 85060 BLOOD SMEAR INTERPRETATION: CPT | Mod: ,,, | Performed by: PATHOLOGY

## 2018-05-31 PROCEDURE — 85730 THROMBOPLASTIN TIME PARTIAL: CPT

## 2018-05-31 RX ORDER — PROMETHAZINE HYDROCHLORIDE 12.5 MG/1
12.5 SUPPOSITORY RECTAL EVERY 6 HOURS PRN
Status: DISCONTINUED | OUTPATIENT
Start: 2018-05-31 | End: 2018-06-06 | Stop reason: HOSPADM

## 2018-05-31 RX ORDER — ALLOPURINOL 100 MG/1
100 TABLET ORAL DAILY
Status: DISCONTINUED | OUTPATIENT
Start: 2018-05-31 | End: 2018-06-06 | Stop reason: HOSPADM

## 2018-05-31 RX ORDER — ENOXAPARIN SODIUM 100 MG/ML
40 INJECTION SUBCUTANEOUS EVERY 24 HOURS
Status: DISCONTINUED | OUTPATIENT
Start: 2018-05-31 | End: 2018-05-31

## 2018-05-31 RX ORDER — CEFEPIME HYDROCHLORIDE 2 G/1
2 INJECTION, POWDER, FOR SOLUTION INTRAVENOUS EVERY 24 HOURS
Status: DISCONTINUED | OUTPATIENT
Start: 2018-06-01 | End: 2018-06-02

## 2018-05-31 RX ORDER — CEFEPIME HYDROCHLORIDE 2 G/1
2 INJECTION, POWDER, FOR SOLUTION INTRAVENOUS
Status: COMPLETED | OUTPATIENT
Start: 2018-05-31 | End: 2018-05-31

## 2018-05-31 RX ORDER — SODIUM CHLORIDE 0.9 % (FLUSH) 0.9 %
5 SYRINGE (ML) INJECTION
Status: DISCONTINUED | OUTPATIENT
Start: 2018-05-31 | End: 2018-06-06 | Stop reason: HOSPADM

## 2018-05-31 RX ORDER — HEPARIN SODIUM 5000 [USP'U]/ML
5000 INJECTION, SOLUTION INTRAVENOUS; SUBCUTANEOUS EVERY 8 HOURS
Status: DISCONTINUED | OUTPATIENT
Start: 2018-05-31 | End: 2018-06-01

## 2018-05-31 RX ORDER — HYDROCODONE BITARTRATE AND ACETAMINOPHEN 500; 5 MG/1; MG/1
TABLET ORAL
Status: DISCONTINUED | OUTPATIENT
Start: 2018-05-31 | End: 2018-06-01

## 2018-05-31 RX ADMIN — VANCOMYCIN HYDROCHLORIDE 500 MG: 500 INJECTION, POWDER, LYOPHILIZED, FOR SOLUTION INTRAVENOUS at 09:05

## 2018-05-31 RX ADMIN — HEPARIN SODIUM 5000 UNITS: 5000 INJECTION, SOLUTION INTRAVENOUS; SUBCUTANEOUS at 09:05

## 2018-05-31 RX ADMIN — CEFEPIME HYDROCHLORIDE 2 G: 2 INJECTION, POWDER, FOR SOLUTION INTRAVENOUS at 09:05

## 2018-05-31 RX ADMIN — SODIUM CHLORIDE 1197 ML: 0.9 INJECTION, SOLUTION INTRAVENOUS at 09:05

## 2018-05-31 RX ADMIN — VANCOMYCIN HYDROCHLORIDE 250 MG: 500 INJECTION, POWDER, LYOPHILIZED, FOR SOLUTION INTRAVENOUS at 05:05

## 2018-05-31 RX ADMIN — ALLOPURINOL 100 MG: 100 TABLET ORAL at 04:05

## 2018-05-31 RX ADMIN — SODIUM CHLORIDE 1000 ML: 0.9 INJECTION, SOLUTION INTRAVENOUS at 10:05

## 2018-05-31 NOTE — HPI
"78 y/o PMH HTN, sickle cell trait, JAK2 V617F positive myeloproliferative disorder who presents from home with chief complaint of unresponsiveness.  History is limited and provided by chart and family.  Family reports patient has been getting progressively worst of the past 2 weeks.  They report the patient's PO intake significantly declined, she developed loose stools x3 days, cough x 2 days and intermittent fevers.  Family became concerned when the patient stopped talking 5 days ago.  Family was instructed to bring her to the ED 2 days ago where she received "only fluid" and was discharged home.  Patient continued to decline and this morning was difficult to arouse.  In the ED she was started on abx, fluid resuscitated and given 1 unit of blood.  MRI showed no acute processes.  Per family patient is much more awake following fluid.   They denied nausea/vomiting, or visible discomfort.  Of note patient was recently started on clonazepam by her family doctor.  At baseline patient follows simple commands, will request simple needs, and is chronically confused.  She has been bed bound for the past couple of months.       Recently transitioned from hydroxyurea to jakofi.  Oncological history detailed in BMT note.     Interval update.  Patient admitted to floor with improvement in mental status with abx.  Primary team engaging in active goals of care conversations and plans for hospice.  Consulted for tachypnea and tachycardiac.  On exam patient is much more awake from previous exam however is still near non verbal and does not respond appropriately.  Daughter is at bedside and reports worsening abdominal distension and work of breathing.  Last BM was yesterday.  She reports her mother's mental status appears to wax and wane throughout the day, however is significantly improved from admission.  Regarding earlier goals of care conversions, the family has decided to make the patient DNR but still is undecided on hospice.  " Per chart review afebrile with increasing WBC, family reports no signs of distress.

## 2018-05-31 NOTE — ED NOTES
Nonverbal. Opens eyes--PRBC's infusing in right forearm.No redness or swelling at iv site. Family at bedside . Maintained on cardiac monitor

## 2018-05-31 NOTE — CONSULTS
"[facility]  Critical Care Medicine  Consult Note    Patient Name: Courtney Hammer  MRN: [id number]  Admission Date: 2018  Hospital Length of Stay: 0 days  Code Status: Full Code  Attending Physician: José Hampton MD   Primary Care Provider: Florencio Pacheco MD   Principal Problem: <principal problem not specified>    Consults  Subjective:     HPI:  78 y/o PMH HTN, sickle cell trait, JAK2 V617F positive myeloproliferative disorder who presents from home with chief complaint of unresponsiveness.  History is limited and provided by chart and family.  Family reports patient has been getting progressively worst of the past 2 weeks.  They report the patient's PO intake significantly declined, she developed loose stools x3 days, cough x 2 days and intermittent fevers.  Family became concerned when the patient stopped talking 5 days ago.  Family was instructed to bring her to the ED 2 days ago where she received "only fluid" and was discharged home.  Patient continued to decline and this morning was difficult to arouse.  In the ED she was started on abx, fluid resuscitated and given 1 unit of blood.  MRI showed no acute processes.  Per family patient is much more awake following fluid.   They denied nausea/vomiting, or visible discomfort.  Of note patient was recently started on clonazepam by her family doctor.  At baseline patient follows simple commands, will request simple needs, and is chronically confused.  She has been bed bound for the past couple of months.       Recently transitioned from hydroxyurea to jakofi.  Oncological history detailed in BMT note.     Hospital/ICU Course:  No notes on file    Past Medical History:   Diagnosis Date    Anemia     Bone marrow disorder     Cataract     Encounter for blood transfusion     Hypertension     Sickle cell trait        Past Surgical History:   Procedure Laterality Date     SECTION      HYSTERECTOMY         Review of patient's " allergies indicates:  No Known Allergies    Family History     Problem Relation (Age of Onset)    Prostate cancer Brother    Sickle cell anemia Father    Thyroid disease Mother        Social History Main Topics    Smoking status: Never Smoker    Smokeless tobacco: Never Used    Alcohol use No    Drug use: No    Sexual activity: Not on file      Review of Systems   Unable to perform ROS: Patient nonverbal     Objective:     Vital Signs (Most Recent):  Temp: 98.6 °F (37 °C) (05/31/18 1400)  Pulse: (!) 120 (05/31/18 1432)  Resp: (!) 24 (05/31/18 1432)  BP: (!) 119/58 (05/31/18 1432)  SpO2: 99 % (05/31/18 1432) Vital Signs (24h Range):  Temp:  [98.2 °F (36.8 °C)-101.1 °F (38.4 °C)] 98.6 °F (37 °C)  Pulse:  [] 120  Resp:  [16-27] 24  SpO2:  [90 %-100 %] 99 %  BP: ()/(51-63) 119/58   Weight: 39.9 kg (88 lb)  Body mass index is 17.77 kg/m².      Intake/Output Summary (Last 24 hours) at 05/31/18 1444  Last data filed at 05/31/18 1249   Gross per 24 hour   Intake             2197 ml   Output                0 ml   Net             2197 ml       Physical Exam   Constitutional: She appears cachectic. She is uncooperative. She appears ill.   HENT:   Head: Normocephalic and atraumatic.   Eyes: Conjunctivae are normal. Pupils are equal, round, and reactive to light.   Neck: No tracheal deviation present.   Cardiovascular: Regular rhythm.  Tachycardia present.    Pulmonary/Chest: No stridor. No respiratory distress. She has no wheezes. She has no rales.   Rhonchi    Abdominal: Soft. She exhibits mass (splenomegaly). She exhibits no distension. There is no tenderness.   Musculoskeletal: She exhibits no edema or tenderness.   Neurological: She is alert. GCS eye subscore is 2. GCS verbal subscore is 1. GCS motor subscore is 5.   Alert to name.  Opens eyes and follows simple commands. Per notes improved from initial exam.     Skin: Skin is warm and dry.       Vents:     Lines/Drains/Airways     Peripheral Intravenous  Line                 Peripheral IV - Single Lumen 05/31/18 0825 Right Forearm less than 1 day         Peripheral IV - Single Lumen 05/31/18 0832 Right Wrist less than 1 day              Significant Labs:    CBC/Anemia Profile:    Recent Labs  Lab 05/31/18  0830   .30*   HGB 6.3*   HCT 19.8*   PLT 70*   MCV 90   RDW 18.6*        Chemistries:    Recent Labs  Lab 05/31/18  0830      K 3.9      CO2 21*   BUN 43*   CREATININE 2.6*   CALCIUM 9.4   ALBUMIN 2.6*   PROT 7.5   BILITOT 1.3*   ALKPHOS 102   ALT 31   AST 90*       Lactic Acid:   Recent Labs  Lab 05/31/18  0830 05/31/18  1238   LACTATE 2.7* 0.8       Significant Imaging: MRI shows no acute processes     Assessment/Plan:     Neuro   Encephalopathy, metabolic    Suspect multifactorial, worsening sepsis on top of fragility and a history of confusion and clonazepam use.  - MRI negative for stroke, Improved s/p fluids and blood  - Continue Cefepine and Vanc and de-escalate per primary          Pulmonary   Pneumonia of right lower lobe due to infectious organism    Concern for lower right lobe consolidation, unclear on read.  Would repeat CXR tomorrow  - Continue abx per sepsis         Renal/   Acute renal failure superimposed on stage 3 chronic kidney disease    Likely pre-renal related to poor PO intake, diarrhea and sepsis  - Hold fluids for now and monitor response  - Phos and Uric acid to check for TLS   - Further workup if no improvement         ID   Sepsis with acute organ dysfunction    Suspected PNA (High risk for aspiration), and some concern for intra abdominal given hx of diarrhea   - Agree with Cefepime and Vanc for now.    - Lactic acid resolved with fluids   - Repeat CXR tomorrow         Oncology   Leukocytosis    Concern for leukostasis given AMS and fevers. MRI negative for stroke   - Hydrea 1 gram BID via NGT.   - Will defer to BMT primary team.         Anemia    S/P one unit of pRBC, Transfused < 7        Other   Physical  debility    Given fragility and multiple medical co morbidities would consider pal care consult.            DIet: recommend NPO with NGT tube  DVT ppx: recommend heparin.    Thank you for your consult. I will sign off. Please contact us if you have any additional questions.     Case discussed with Staff.       dEdi Rodriguez MD  Critical Care Medicine  [facility]

## 2018-05-31 NOTE — PROGRESS NOTES
[facility]  Hematology  Bone Marrow Transplant  Progress Note    Patient Name: Courtney Hammer  Admission Date: 5/31/2018  Hospital Length of Stay: 0 days  Code Status: Full Code    Subjective:     HPI:   76 y/o PMH HTN, sickle cell trait, JAK2 V617F positive myeloproliferative disorder who presents from home with chief complaint of unresponsiveness. History limited as from ED chart, no family at bedside and no answer from spouse cell in chart. Per report baseline from last clinic visit is non-verbal but follows commands. Per family in last week has had worsening weakness, cough, diarrhea and intermittent fevers. Denied nausea, vomiting. Oncologic history as below. Currently from notes transitioned from hydroxyurea to jakofi    Oncologic History:  She was first noted to have an elevated platelet count in early 2011 with CBC 6/7/11 showing WBC 56.9K, Hb 12.0, Plt 1090K (74P, 21L).  On 7/8/11 she had a marrow biopsy showing 90% cellularity with megakaryocyte hyperplasia with dysmorphic features. Myeloid hyperplasia with left shift. No lymphoid infiltrate or increased blasts. Flow cytometry showed virtually no B cells but no other concerning features. Cytog[employer]ics 46,XY[20]. Bcr-abl negative. Testing for Jak2 V617F on 7/7/15 was positive in 5% of total Jak2 DNA. Additional testing that same day for CalR and Mpl exon 10 mutation were both negative. Hence, she was diagnosed with Jak2+ ET.     She was started on hydroxyurea and ASA for essential thrombocythemia and had been stable on this until she began to require blood transfusions early in 2015. Her RBC transfusion needs slowly increased. She was continued on 1500 mg hydroxyurea, her same dose for many years. Her transfusion needs increased along with an alarming increase in her WBC.     CBC 6/18/15 WBC 31.8K, hemoglobin 4.4, platelets 212K. She received 4U PRBC and CBC on 6/30 WBC 23K, hemoglobin 10.3, platelets 97K.  A repeat marrow on 1/26/16 showed a  100% cellular marrow with increased megakaryocytes and storage iron and only occasional blasts on CD34 staining (morphologic blast count 1%).  Jak2 V617F mutation was again detected without CALR or MPL exon 10 mutations.     Hence, she was started on Ruxolitinib 20 mg bid 2015.  This was cut to 10 mg bid as of 10/9/17. She was hospitalized for pneumonia just before new year ( Dec 2017). It has since been stopped due to persistently rising WBC count.  She was hospitalized in late 2018 for confusion. She had a bone marrow biopsy on 18. There was no AML or increased blasts. The marrow was very hypercellular.      ROS: unable to perform due to mental status change; nonverbal    Past Medical History:   Diagnosis Date    Anemia     Bone marrow disorder     Cataract     Encounter for blood transfusion     Hypertension     Sickle cell trait      Past Surgical History:   Procedure Laterality Date     SECTION      HYSTERECTOMY       Social History     Social History    Marital status:      Spouse name: N/A    Number of children: N/A    Years of education: N/A     Social History Main Topics    Smoking status: Never Smoker    Smokeless tobacco: Never Used    Alcohol use No    Drug use: No    Sexual activity: Not Asked     Other Topics Concern    None     Social History Narrative    None     Family History   Problem Relation Age of Onset    Thyroid disease Mother     Sickle cell anemia Father     Prostate cancer Brother      No current facility-administered medications on file prior to encounter.      Current Outpatient Prescriptions on File Prior to Encounter   Medication Sig Dispense Refill    allopurinol (ZYLOPRIM) 100 MG tablet Take 2 tablets (200 mg total) by mouth once daily. 60 tablet 2    cyproheptadine (PERIACTIN) 4 mg tablet Take 1 tablet (4 mg total) by mouth 3 (three) times daily as needed. 90 tablet 2    hydroCHLOROthiazide (MICROZIDE) 12.5 mg capsule        megestrol (MEGACE) 400 mg/10 mL (40 mg/mL) Susp       potassium chloride SA (K-DUR,KLOR-CON) 20 MEQ tablet Take 2 tablets (40 mEq total) by mouth once daily. 60 tablet 11    promethazine (PHENERGAN) 6.25 mg/5 mL syrup       ruxolitinib (JAKAFI) 5 mg Tab Take 1 tablet (5mg total) by mouth 2 (two) times daily. 60 tablet 2    VIOS AEROSOL DELIVERY SYSTEM Phuong USE AS DIRECTED  0    walker Misc Rolling walker 1 each 0    zinc gluconate 50 mg tablet Take 50 mg by mouth once daily.       Review of patient's allergies indicates:  No Known Allergies      Objective:     Vital Signs (Most Recent):  Temp: 100.3 °F (37.9 °C) (05/31/18 0908)  Pulse: (!) 115 (05/31/18 1246)  Resp: 20 (05/31/18 1246)  BP: (!) 107/58 (05/31/18 1246)  SpO2: 99 % (05/31/18 1246) Vital Signs (24h Range):  Temp:  [99.7 °F (37.6 °C)-101.1 °F (38.4 °C)] 100.3 °F (37.9 °C)  Pulse:  [] 115  Resp:  [16-27] 20  SpO2:  [90 %-100 %] 99 %  BP: ()/(51-63) 107/58     Weight: 39.9 kg (88 lb)  Body mass index is 17.77 kg/m².  Body surface area is 1.29 meters squared.    ECOG SCORE         [unfilled]    Intake/Output - Last 3 Shifts       05/29 0700 - 05/30 0659 05/30 0700 - 05/31 0659 05/31 0700 - 06/01 0659    IV Piggyback   2197    Total Intake(mL/kg)   2197 (55.1)    Net     +2197                 Physical Exam   Constitutional: She appears lethargic. She appears cachectic. She is uncooperative. She appears ill.   HENT:   Head: Normocephalic and atraumatic.   Eyes: Conjunctivae are normal. Pupils are equal, round, and reactive to light.   Neck: No tracheal deviation present.   Cardiovascular: Regular rhythm.  Tachycardia present.    Pulmonary/Chest: No stridor. No respiratory distress. She has no wheezes. She has no rales.   Abdominal: Soft. She exhibits mass (splenomegaly). She exhibits no distension. There is no tenderness.   Musculoskeletal: She exhibits no edema or tenderness.   Neurological: She appears lethargic. GCS eye subscore is 2. GCS  verbal subscore is 1. GCS motor subscore is 5.   Opens eyes to sternal rub but quickly falls asleep. Baseline nonverbal and no family for collateral complicates exam, but appears more lethargic compared to clinic notes. Not participatory for full neuro exam   Skin: Skin is warm and dry.       Significant Labs:   CBC:   Recent Labs  Lab 05/31/18  0830   .30*   HGB 6.3*   HCT 19.8*   PLT 70*    and CMP:   Recent Labs  Lab 05/31/18  0830      K 3.9      CO2 21*   *   BUN 43*   CREATININE 2.6*   CALCIUM 9.4   PROT 7.5   ALBUMIN 2.6*   BILITOT 1.3*   ALKPHOS 102   AST 90*   ALT 31   ANIONGAP 14   EGFRNONAA 17.2*       Diagnostic Results:  CxR: Interstitial infiltrates RLL    Assessment/Plan:     Encephalopathy, metabolic    Likely 2/2 sepsis but high risk for clots 2/2 disease  -Recommend ABG to further evaluate  -Recommend MRI Brain to r/o CVA        Acute renal failure superimposed on stage 3 chronic kidney disease    Likely 2/2 sepsis, s/p 30cc/kg, monitor for improvement, if not improved consider US retroperitoneal and urine lytes        Sepsis with acute organ dysfunction    Presents with mental status change, FELICE and fevers  -Sources at present include RLL PNA vs UTI vs less likely CNS (no meningeal signs)  -Organ dysfunction of mental status change and FELICE  -4/4 SIRS, 2/3 qSOFA, LA 2.7  -s/p 30cc/kg bous  -s/p Vanc, Cefepime in ED  -Recommend MICU consultation        Pneumonia of right lower lobe due to infectious organism    -See sepsis        Myeloproliferative disorder    Oncologic History per HPI  -Hold jakafi, would likely consider restarting hydrea; however, unable to take pills at present  -Transfuse for Hgb < 7, plt < 10k unless active bleed found  -No evidence of transformation on last marrow, but high risk for AML transformation        Anemia    See myeloproliferative d/o          VTE Risk Mitigation         Ordered     enoxaparin injection 40 mg  Daily      05/31/18 1057     IP  VTE HIGH RISK PATIENT  Once      05/31/18 1053        Disposition: pending MICU eval, if stable for floor will admit to BMT. Attempted goals of care discussion but unable to reach family despite a couple attempts. On repeat discussion with family they report she has had declining functional status for 2 months and has been mostly bed bound. They report they have not decided what they would like to do in the event of cardiopulmonary arrest but if she was able to recover would likely want aggressive care for now while deciding. They state if she is not able to recover would not likely want aggressive measures.     Watson Luong IV, MD  Bone Marrow Transplant  [facility]

## 2018-05-31 NOTE — ASSESSMENT & PLAN NOTE
Suspected PNA (High risk for aspiration), and some concern for intra abdominal given hx of diarrhea   - Agree with Cefepime and Vanc for now.    - Lactic acid resolved with fluids   - Repeat CXR tomorrow

## 2018-05-31 NOTE — ED PROVIDER NOTES
Encounter Date: 2018    SCRIBE #1 NOTE: I, Vonnie Platt, am scribing for, and in the presence of,  Dr. Hampton. I have scribed the entire note.       History     Chief Complaint   Patient presents with    Fever     Family reports fever and weakness for past few days.       Time patient was seen by the provider: 8:08 AM      The patient is a 77 y.o. female with co-morbidities including: HTN and anemia who presents to the ED with a complaint of intermittent fever for 1 week and worsening generalized weakness for 2 weeks. Pt has been taking tylenol and motrin for the fever but with no relief. Pt's relative states that pt is increasingly unresponsive and is not eating anything. Pt has no vomiting but has episodes of diarrhea. Additionally endorses cough and dark colored urine. Denies any trauma. Pt is followed closely by Dr. Parmar of the bone transplant team for myeloproliferative neoplasm and has enlarged spleen due to this.      The history is provided by a relative and medical records.     Review of patient's allergies indicates:  No Known Allergies  Past Medical History:   Diagnosis Date    Anemia     Bone marrow disorder     Cataract     Encounter for blood transfusion     Hypertension     Sickle cell trait      Past Surgical History:   Procedure Laterality Date     SECTION      HYSTERECTOMY       Family History   Problem Relation Age of Onset    Thyroid disease Mother     Sickle cell anemia Father     Prostate cancer Brother      Social History   Substance Use Topics    Smoking status: Never Smoker    Smokeless tobacco: Never Used    Alcohol use No     Review of Systems   Constitutional: Positive for fever.        (+) unresponsiveness   HENT: Negative for congestion.    Eyes: Negative for pain.   Respiratory: Positive for cough.    Cardiovascular: Negative for chest pain.   Gastrointestinal: Positive for diarrhea. Negative for vomiting.   Genitourinary:        (+) dark-colored urine    Musculoskeletal: Negative for myalgias.   Skin: Negative for rash.   Neurological: Positive for weakness.       Physical Exam     Initial Vitals [05/31/18 0757]   BP Pulse Resp Temp SpO2   (!) 95/51 (!) 158 16 (!) 101.1 °F (38.4 °C) (!) 90 %      MAP       65.67         Physical Exam    Nursing note and vitals reviewed.  Constitutional: She appears lethargic. She is not diaphoretic. She appears ill (chronically).   HENT:   Head: Normocephalic and atraumatic.   Mouth/Throat: Oropharynx is clear and moist.   Bleeding to right lip   Eyes: Conjunctivae and EOM are normal. Pupils are equal, round, and reactive to light.   Neck: Normal range of motion. Neck supple. No JVD present.   Cardiovascular: Regular rhythm and normal heart sounds. Tachycardia present.    No murmur heard.  Pulmonary/Chest: Breath sounds normal. No respiratory distress. She has no wheezes. She has no rhonchi. She has no rales.   Abdominal: Soft. Bowel sounds are normal. She exhibits no distension. There is no tenderness. There is no rebound and no guarding.   Significant splenomegaly    Musculoskeletal: Normal range of motion. She exhibits no edema or tenderness.   Extremities are atraumatic. She moves arms and legs equally.   Neurological: She is oriented to person, place, and time. She has normal strength. She appears lethargic. No cranial nerve deficit or sensory deficit.   Skin: Skin is warm and dry. No rash noted.   Psychiatric: She has a normal mood and affect.         ED Course   Procedures  Labs Reviewed   CBC W/ AUTO DIFFERENTIAL - Abnormal; Notable for the following:        Result Value    .30 (*)     RBC 2.19 (*)     Hemoglobin 6.3 (*)     Hematocrit 19.8 (*)     MCHC 31.8 (*)     RDW 18.6 (*)     Platelets 70 (*)     nRBC 2 (*)     Lymph% 4.0 (*)     Mono% 1.0 (*)     Platelet Estimate Decreased (*)     All other components within normal limits    Narrative:     WBC critical result(s) called and verbal readback obtained from Lakia    LETY Sal, 05/31/2018 09:30  HCT critical result(s) called and verbal readback obtained from Lakia Sal RN, 05/31/2018 09:30   COMPREHENSIVE METABOLIC PANEL - Abnormal; Notable for the following:     CO2 21 (*)     Glucose 195 (*)     BUN, Bld 43 (*)     Creatinine 2.6 (*)     Albumin 2.6 (*)     Total Bilirubin 1.3 (*)     AST 90 (*)     eGFR if  19.8 (*)     eGFR if non  17.2 (*)     All other components within normal limits   LACTIC ACID, PLASMA - Abnormal; Notable for the following:     Lactate (Lactic Acid) 2.7 (*)     All other components within normal limits   URINALYSIS, REFLEX TO URINE CULTURE - Abnormal; Notable for the following:     Appearance, UA Cloudy (*)     Leukocytes, UA 1+ (*)     All other components within normal limits    Narrative:     Preferred Collection Type->Urine, Clean Catch   PROTIME-INR - Abnormal; Notable for the following:     Prothrombin Time 13.7 (*)     INR 1.4 (*)     All other components within normal limits   URINALYSIS MICROSCOPIC - Abnormal; Notable for the following:     Bacteria, UA Many (*)     Yeast, UA Rare (*)     All other components within normal limits    Narrative:     Preferred Collection Type->Urine, Clean Catch   CULTURE, BLOOD   CULTURE, BLOOD   APTT   URIC ACID   LACTIC ACID, PLASMA     EKG Readings: (Independently Interpreted)   Rhythm: Sinus Tachycardia. Heart Rate: 146.   No ischemic changes.       X-Rays:   Independently Interpreted Readings:   Chest X-Ray: There is an infiltrate in the RLL.     Medical Decision Making:   History:   Old Medical Records: I decided to obtain old medical records.  Initial Assessment:   Pt with severe sepsis and immunocompromised. Will initiate IV antibiotics, fluids and discuss with oncology service.  Independently Interpreted Test(s):   I have ordered and independently interpreted X-rays - see prior notes.  I have ordered and independently interpreted EKG Reading(s) - see prior  notes  Clinical Tests:   Lab Tests: Ordered and Reviewed  Radiological Study: Ordered and Reviewed  Medical Tests: Ordered and Reviewed  ED Management:  10:46   Reviewed her studies. She has right lower lobe infiltrate on CXR and FELICE on her labs. Her vital signs are improving. Discussed case with BMT team. They will call me back with acceptance for hospitalization.  Other:   I have discussed this case with another health care provider.            Scribe Attestation:   Scribe #1: I performed the above scribed service and the documentation accurately describes the services I performed. I attest to the accuracy of the note.    Attending Attestation:         Attending Critical Care:   Critical Care Times:   Direct Patient Care (initial evaluation, reassessments, and time considering the case)................................................................28 minutes.   Additional History from reviewing old medical records or taking additional history from the family, EMS, PCP, etc.......................4 minutes.   Ordering, Reviewing, and Interpreting Diagnostic Studies...............................................................................................................4 minutes.   Documentation..................................................................................................................................................................................4 minutes.   Consultation with other Physicians. .................................................................................................................................................4 minutes.   ==============================================================  · Total Critical Care Time - exclusive of procedural time: 44 minutes.  ==============================================================                 Clinical Impression:   The primary encounter diagnosis was Pneumonia of right lower lobe due to infectious organism. A diagnosis  of Sepsis was also pertinent to this visit.    Disposition:   Disposition: Admitted                        José Hampton MD  05/31/18 1508

## 2018-05-31 NOTE — ASSESSMENT & PLAN NOTE
Presents with mental status change, FELICE and fevers; improving  -Sources at present include RLL PNA vs UTI vs less likely CNS (no meningeal signs)  -Organ dysfunction of mental status change and FELICE  -4/4 SIRS, 2/3 qSOFA, LA 2.7, PCT 5  -s/p 30cc/kg bous  -Continue Vanc, Cefepime    -Evaluated by MICU who recommends floor admission, appreciate help

## 2018-05-31 NOTE — ASSESSMENT & PLAN NOTE
Likely pre-renal related to poor PO intake, diarrhea and sepsis  - Hold fluids for now and monitor response  - Phos and Uric acid to check for TLS   - Further workup if no improvement

## 2018-05-31 NOTE — ASSESSMENT & PLAN NOTE
Concern for lower right lobe consolidation, unclear on read.  Would repeat CXR tomorrow  - Continue abx per sepsis

## 2018-05-31 NOTE — CONSULTS
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 History limited as from ED chart, no family at bedside and no answer from spouse cell in chart. Per report baseline from last clinic visit is non-verbal but follows commands. Per family in last week has had worsening weakness, cough, diarrhea and intermittent fevers. Denied nausea, vomiting. Oncologic history as below. Currently from notes transitioned from hydroxyurea to jakofi    Oncologic History:  She was first noted to have an elevated platelet count in early 2011 with CBC 6/7/11 showing WBC 56.9K, Hb 12.0, Plt 1090K (74P, 21L).  On 7/8/11 she had a marrow biopsy showing 90% cellularity with megakaryocyte hyperplasia with dysmorphic features. Myeloid hyperplasia with left shift. No lymphoid infiltrate or increased blasts. Flow cytometry showed virtually no B cells but no other concerning features. Cytogentics 46,XY[20]. Bcr-abl negative. Testing for Jak2 V617F on 7/7/15 was positive in 5% of total Jak2 DNA. Additional testing that same day for CalR and Mpl exon 10 mutation were both negative. Hence, she was diagnosed with Jak2+ ET.     She was started on hydroxyurea and ASA for essential thrombocythemia and had been stable on this until she began to require blood transfusions early in 2015. Her RBC transfusion needs slowly increased. She was continued on 1500 mg hydroxyurea, her same dose for many years. Her transfusion needs increased along with an alarming increase in her WBC.     CBC 6/18/15 WBC 31.8K, hemoglobin 4.4, platelets 212K. She received 4U PRBC and CBC on 6/30 WBC 23K, hemoglobin 10.3, platelets 97K.  A repeat marrow on 1/26/16 showed a  100% cellular marrow with increased megakaryocytes and storage iron and only occasional blasts on CD34 staining (morphologic blast count 1%).  Jak2 V617F mutation was again detected without CALR or MPL exon 10 mutations.     Hence, she was started on Ruxolitinib 20 mg bid 2015.  This was cut to 10 mg bid as of 10/9/17. She was hospitalized for pneumonia just before new year ( Dec 2017). It has since been stopped due to persistently rising WBC count.  She was hospitalized in late 2018 for confusion. She had a bone marrow biopsy on 18. There was no AML or increased blasts. The marrow was very hypercellular.      ROS: unable to perform due to mental status change; nonverbal    Past Medical History:   Diagnosis Date    Anemia     Bone marrow disorder     Cataract     Encounter for blood transfusion     Hypertension     Sickle cell trait      Past Surgical History:   Procedure Laterality Date     SECTION      HYSTERECTOMY       Social History     Social History    Marital status:      Spouse name: N/A    Number of children: N/A    Years of education: N/A     Social History Main Topics    Smoking status: Never Smoker    Smokeless tobacco: Never Used    Alcohol use No    Drug use: No    Sexual activity: Not Asked     Other Topics Concern    None     Social History Narrative    None     Family History   Problem Relation Age of Onset    Thyroid disease Mother     Sickle cell anemia Father     Prostate cancer Brother      No current facility-administered medications on file prior to encounter.      Current Outpatient Prescriptions on File Prior to Encounter   Medication Sig Dispense Refill    allopurinol (ZYLOPRIM) 100 MG tablet Take 2 tablets (200 mg total) by mouth once daily. 60 tablet 2    cyproheptadine (PERIACTIN) 4 mg tablet Take 1 tablet (4 mg total) by mouth 3 (three) times daily as needed. 90 tablet 2    hydroCHLOROthiazide (MICROZIDE) 12.5 mg capsule        megestrol (MEGACE) 400 mg/10 mL (40 mg/mL) Susp       potassium chloride SA (K-DUR,KLOR-CON) 20 MEQ tablet Take 2 tablets (40 mEq total) by mouth once daily. 60 tablet 11    promethazine (PHENERGAN) 6.25 mg/5 mL syrup       ruxolitinib (JAKAFI) 5 mg Tab Take 1 tablet (5mg total) by mouth 2 (two) times daily. 60 tablet 2    VIOS AEROSOL DELIVERY SYSTEM Phuong USE AS DIRECTED  0    walker Misc Rolling walker 1 each 0    zinc gluconate 50 mg tablet Take 50 mg by mouth once daily.       Review of patient's allergies indicates:  No Known Allergies      Objective:     Vital Signs (Most Recent):  Temp: 100.3 °F (37.9 °C) (05/31/18 0908)  Pulse: (!) 115 (05/31/18 1246)  Resp: 20 (05/31/18 1246)  BP: (!) 107/58 (05/31/18 1246)  SpO2: 99 % (05/31/18 1246) Vital Signs (24h Range):  Temp:  [99.7 °F (37.6 °C)-101.1 °F (38.4 °C)] 100.3 °F (37.9 °C)  Pulse:  [] 115  Resp:  [16-27] 20  SpO2:  [90 %-100 %] 99 %  BP: ()/(51-63) 107/58     Weight: 39.9 kg (88 lb)  Body mass index is 17.77 kg/m².  Body surface area is 1.29 meters squared.    ECOG SCORE         [unfilled]    Intake/Output - Last 3 Shifts       05/29 0700 - 05/30 0659 05/30 0700 - 05/31 0659 05/31 0700 - 06/01 0659    IV Piggyback   2197    Total Intake(mL/kg)   2197 (55.1)    Net     +2197                 Physical Exam   Constitutional: She appears lethargic. She appears cachectic. She is uncooperative. She appears ill.   HENT:   Head: Normocephalic and atraumatic.   Eyes: Conjunctivae are normal. Pupils are equal, round, and reactive to light.   Neck: No tracheal deviation present.   Cardiovascular: Regular rhythm.  Tachycardia present.    Pulmonary/Chest: No stridor. No respiratory distress. She has no wheezes. She has no rales.   Abdominal: Soft. She exhibits mass (splenomegaly). She exhibits no distension. There is no tenderness.   Musculoskeletal: She exhibits no edema or tenderness.   Neurological: She appears lethargic. GCS eye subscore is 2. GCS  verbal subscore is 1. GCS motor subscore is 5.   Opens eyes to sternal rub but quickly falls asleep. Baseline nonverbal and no family for collateral complicates exam, but appears more lethargic compared to clinic notes. Not participatory for full neuro exam   Skin: Skin is warm and dry.       Significant Labs:   CBC:   Recent Labs  Lab 05/31/18  0830   .30*   HGB 6.3*   HCT 19.8*   PLT 70*    and CMP:   Recent Labs  Lab 05/31/18  0830      K 3.9      CO2 21*   *   BUN 43*   CREATININE 2.6*   CALCIUM 9.4   PROT 7.5   ALBUMIN 2.6*   BILITOT 1.3*   ALKPHOS 102   AST 90*   ALT 31   ANIONGAP 14   EGFRNONAA 17.2*       Diagnostic Results:  CxR: Interstitial infiltrates RLL    Assessment/Plan:     Encephalopathy, metabolic    Likely 2/2 sepsis but high risk for clots 2/2 disease  -Recommend ABG to further evaluate  -Recommend MRI Brain to r/o CVA        Acute renal failure superimposed on stage 3 chronic kidney disease    Likely 2/2 sepsis, s/p 30cc/kg, monitor for improvement, if not improved consider US retroperitoneal and urine lytes        Sepsis with acute organ dysfunction    Presents with mental status change, FELICE and fevers  -Sources at present include RLL PNA vs UTI vs less likely CNS (no meningeal signs)  -Organ dysfunction of mental status change and FELICE  -4/4 SIRS, 2/3 qSOFA, LA 2.7  -PSI Class V, CURB 65 Score 3  -s/p 30cc/kg bous  -s/p Vanc, Cefepime in ED  -Recommend MICU consultation        Pneumonia of right lower lobe due to infectious organism    -See sepsis        Myeloproliferative disorder    Oncologic History per HPI  -Hold jakafi, would likely consider restarting hydrea; however, unable to take pills at present  -Transfuse for Hgb < 7, plt < 10k unless active bleed found  -No evidence of transformation on last marrow, but high risk for AML transformation        Anemia    See myeloproliferative d/o          VTE Risk Mitigation         Ordered     enoxaparin injection 40 mg   Daily      05/31/18 1057     IP VTE HIGH RISK PATIENT  Once      05/31/18 1057        Disposition: pending MICU eval, if stable for floor will admit to BMT. Attempted goals of care discussion but unable to reach family despite a couple attempts. On repeat discussion with family they report she has had declining functional status for 2 months and has been mostly bed bound. They report they have not decided what they would like to do in the event of cardiopulmonary arrest but if she was able to recover would likely want aggressive care for now while deciding. They state if she is not able to recover would not likely want aggressive measures.     Watson Luong IV, MD  Bone Marrow Transplant  [facility]

## 2018-05-31 NOTE — ASSESSMENT & PLAN NOTE
Suspect multifactorial, worsening sepsis on top of fragility and a history of confusion and clonazepam use.  - MRI negative for stroke, Improved s/p fluids and blood  - Continue Cefepine and Vanc and de-escalate per primary

## 2018-05-31 NOTE — HPI
76 y/o PMH HTN, sickle cell trait, JAK2 V617F positive myeloproliferative disorder who presents from home with chief complaint of unresponsiveness. History limited as from ED chart, no family at bedside and no answer from spouse cell in chart. Per report baseline from last clinic visit is non-verbal but follows commands. Per family in last week has had worsening weakness, cough, diarrhea and intermittent fevers. Denied nausea, vomiting. Oncologic history as below. Currently from notes transitioned from hydroxyurea to jakofi    History:  She was first noted to have an elevated platelet count in early 2011 with CBC 6/7/11 showing WBC 56.9K, Hb 12.0, Plt 1090K (74P, 21L).  On 7/8/11 she had a marrow biopsy showing 90% cellularity with megakaryocyte hyperplasia with dysmorphic features. Myeloid hyperplasia with left shift. No lymphoid infiltrate or increased blasts. Flow cytometry showed virtually no B cells but no other concerning features. Cytogentics 46,XY[20]. Bcr-abl negative. Testing for Jak2 V617F on 7/7/15 was positive in 5% of total Jak2 DNA. Additional testing that same day for CalR and Mpl exon 10 mutation were both negative. Hence, she was diagnosed with Jak2+ ET.     She was started on hydroxyurea and ASA for essential thrombocythemia and had been stable on this until she began to require blood transfusions early in 2015. Her RBC transfusion needs slowly increased. She was continued on 1500 mg hydroxyurea, her same dose for many years. Her transfusion needs increased along with an alarming increase in her WBC.     CBC 6/18/15 WBC 31.8K, hemoglobin 4.4, platelets 212K. She received 4U PRBC and CBC on 6/30 WBC 23K, hemoglobin 10.3, platelets 97K.  A repeat marrow on 1/26/16 showed a 100% cellular marrow with increased megakaryocytes and storage iron and only occasional blasts on CD34 staining (morphologic blast count 1%).  Jak2 V617F mutation was again detected without CALR or MPL exon 10  mutations.     Hence, she was started on Ruxolitinib 20 mg bid 2/2015.  This was cut to 10 mg bid as of 10/9/17. She was hospitalized for pneumonia just before new year ( Dec 2017). It has since been stopped due to persistently rising WBC count.  She was hospitalized in late February 2018 for confusion. She had a bone marrow biopsy on 2/21/18. There was no AML or increased blasts. The marrow was very hypercellular.

## 2018-05-31 NOTE — ASSESSMENT & PLAN NOTE
Likely 2/2 sepsis, s/p 30cc/kg, monitor for improvement, if not improved consider US retroperitoneal and urine lytes

## 2018-05-31 NOTE — SUBJECTIVE & OBJECTIVE
Subjective:     Interval History:    Objective:     Vital Signs (Most Recent):  Temp: 100.3 °F (37.9 °C) (05/31/18 0908)  Pulse: (!) 115 (05/31/18 1246)  Resp: 20 (05/31/18 1246)  BP: (!) 107/58 (05/31/18 1246)  SpO2: 99 % (05/31/18 1246) Vital Signs (24h Range):  Temp:  [99.7 °F (37.6 °C)-101.1 °F (38.4 °C)] 100.3 °F (37.9 °C)  Pulse:  [] 115  Resp:  [16-27] 20  SpO2:  [90 %-100 %] 99 %  BP: ()/(51-63) 107/58     Weight: 39.9 kg (88 lb)  Body mass index is 17.77 kg/m².  Body surface area is 1.29 meters squared.    ECOG SCORE         [unfilled]    Intake/Output - Last 3 Shifts       05/29 0700 - 05/30 0659 05/30 0700 - 05/31 0659 05/31 0700 - 06/01 0659    IV Piggyback   2197    Total Intake(mL/kg)   2197 (55.1)    Net     +2197                 Physical Exam   Constitutional: She appears cachectic. She is uncooperative. She appears ill.   HENT:   Head: Normocephalic and atraumatic.   Eyes: Conjunctivae are normal. Pupils are equal, round, and reactive to light.   Neck: No tracheal deviation present.   Cardiovascular: Regular rhythm.  Tachycardia present.    Pulmonary/Chest: No stridor. No respiratory distress. She has no wheezes. She has no rales.   Abdominal: Soft. She exhibits mass (splenomegaly). She exhibits no distension. There is no tenderness.   Musculoskeletal: She exhibits no edema or tenderness.   Neurological: She is alert.   Alert, does not follow commands appropriately, does not participate with exam.   Skin: Skin is warm and dry.       Significant Labs:   CBC:   Recent Labs  Lab 05/31/18  0830   .30*   HGB 6.3*   HCT 19.8*   PLT 70*    and CMP:   Recent Labs  Lab 05/31/18  0830      K 3.9      CO2 21*   *   BUN 43*   CREATININE 2.6*   CALCIUM 9.4   PROT 7.5   ALBUMIN 2.6*   BILITOT 1.3*   ALKPHOS 102   AST 90*   ALT 31   ANIONGAP 14   EGFRNONAA 17.2*       Diagnostic Results:  CxR: Interstitial infiltrates RLL

## 2018-05-31 NOTE — SUBJECTIVE & OBJECTIVE
Past Medical History:   Diagnosis Date    Anemia     Bone marrow disorder     Cataract     Encounter for blood transfusion     Hypertension     Sickle cell trait        Past Surgical History:   Procedure Laterality Date     SECTION      HYSTERECTOMY         Review of patient's allergies indicates:  No Known Allergies    Family History     Problem Relation (Age of Onset)    Prostate cancer Brother    Sickle cell anemia Father    Thyroid disease Mother        Social History Main Topics    Smoking status: Never Smoker    Smokeless tobacco: Never Used    Alcohol use No    Drug use: No    Sexual activity: Not on file      Review of Systems   Unable to perform ROS: Patient nonverbal     Objective:     Vital Signs (Most Recent):  Temp: 98.6 °F (37 °C) (18 1400)  Pulse: (!) 120 (18 1432)  Resp: (!) 24 (18 1432)  BP: (!) 119/58 (18 1432)  SpO2: 99 % (18 1432) Vital Signs (24h Range):  Temp:  [98.2 °F (36.8 °C)-101.1 °F (38.4 °C)] 98.6 °F (37 °C)  Pulse:  [] 120  Resp:  [16-27] 24  SpO2:  [90 %-100 %] 99 %  BP: ()/(51-63) 119/58   Weight: 39.9 kg (88 lb)  Body mass index is 17.77 kg/m².      Intake/Output Summary (Last 24 hours) at 18 1444  Last data filed at 18 1249   Gross per 24 hour   Intake             2197 ml   Output                0 ml   Net             2197 ml       Physical Exam   Constitutional: She appears cachectic. She is uncooperative. She appears ill.   HENT:   Head: Normocephalic and atraumatic.   Eyes: Conjunctivae are normal. Pupils are equal, round, and reactive to light.   Neck: No tracheal deviation present.   Cardiovascular: Regular rhythm.  Tachycardia present.    Pulmonary/Chest: No stridor. No respiratory distress. She has no wheezes. She has no rales.   Rhonchi    Abdominal: Soft. She exhibits mass (splenomegaly). She exhibits no distension. There is no tenderness.   Musculoskeletal: She exhibits no edema or tenderness.    Neurological: She is alert. GCS eye subscore is 2. GCS verbal subscore is 1. GCS motor subscore is 5.   Alert to name.  Opens eyes and follows simple commands. Per notes improved from initial exam.     Skin: Skin is warm and dry.       Vents:     Lines/Drains/Airways     Peripheral Intravenous Line                 Peripheral IV - Single Lumen 05/31/18 0825 Right Forearm less than 1 day         Peripheral IV - Single Lumen 05/31/18 0832 Right Wrist less than 1 day              Significant Labs:    CBC/Anemia Profile:    Recent Labs  Lab 05/31/18  0830   .30*   HGB 6.3*   HCT 19.8*   PLT 70*   MCV 90   RDW 18.6*        Chemistries:    Recent Labs  Lab 05/31/18  0830      K 3.9      CO2 21*   BUN 43*   CREATININE 2.6*   CALCIUM 9.4   ALBUMIN 2.6*   PROT 7.5   BILITOT 1.3*   ALKPHOS 102   ALT 31   AST 90*       Lactic Acid:   Recent Labs  Lab 05/31/18  0830 05/31/18  1238   LACTATE 2.7* 0.8       Significant Imaging: MRI shows no acute processes

## 2018-05-31 NOTE — ED NOTES
The patient is awake, alert and cooperative with a calm affect, patient is aware of environment. Airway is open and patent, respirations are spontaneous, normal respiratory effort and rate noted, skin warm and dry, moves all extremities well, appearance: no apparent distress noted. Side rails x 2. Call bell within reach. Will continue to monitor. Pt updated on the current status.

## 2018-05-31 NOTE — ASSESSMENT & PLAN NOTE
Oncologic History per HPI  -Hold jakafi, restart hydrea 1g BID  -Transfuse for Hgb < 7, plt < 10k unless active bleed found  -No evidence of transformation on last marrow, but high risk for AML transformation

## 2018-05-31 NOTE — ASSESSMENT & PLAN NOTE
Concern for leukostasis given AMS and fevers. MRI negative for stroke   - Hydrea 1 gram BID via NGT.   - Will defer to BMT primary team.

## 2018-05-31 NOTE — CONSULTS
Patient seen by Dr. Barboza and myself.  Patient is stable for floor.  Discussed recommendations with BMT.  Full consult note to follow.     Eddi Rodriguez M.D.   PGY-2  Pager [phone]

## 2018-05-31 NOTE — ED NOTES
Per daughter and , patient started with a cough about 2 weeks ago and started with fevers this past Friday and has been running fever since.  Reports patient has been slowly declining since Friday.  Patient normally oriented.  Patient currently not answering questions but opens eyes to voice.

## 2018-05-31 NOTE — CARE UPDATE
Patient evaluated in ER with staff. Full consult note to follow. In short patient with myeloproliferative disorder on Jakafi and hydrea (held at times due to counts) who presents with worsening mental status over 1 week, cough, fevers per family report to ED. Baseline per oncology clinic notes is non verbal but awake and confused.     Currently not awakening to pain, minimally opens eyes to sternal rub. Febrile on presentation, CxR with RLL infiltrate. LA 2.7. WBC 160k (near baseline).     Given Vanc, Cefepime x1 and BMT consulted.    Encephalopathy appears worsened from clinic notes. Unable to get good baseline. No family in room and attempted to call  x2 to discuss and evaluate goals of care but did not answer.    Would treat for infection, but additionally is at risk for strokes, clots, and bleeding with her underlying hematogic disease. Recommended MRI and ABG to further assess encephalopathy. In addition her exam is concerning for possibly not protecting airway and imminent intubation if not improving and unable to reach family to discuss goals of care    Discussed with MICU who will come evaluate the patient.       Discussed with BMT Fellow Dr. Samuel Luong MD  Internal Medicine, PGY3

## 2018-05-31 NOTE — ASSESSMENT & PLAN NOTE
Likely 2/2 sepsis but high risk for clots 2/2 disease  -Recommend ABG to further evaluate  -Recommend MRI Brain to r/o CVA

## 2018-06-01 LAB
ALBUMIN SERPL BCP-MCNC: 2.2 G/DL
ALP SERPL-CCNC: 94 U/L
ALT SERPL W/O P-5'-P-CCNC: 25 U/L
ANION GAP SERPL CALC-SCNC: 9 MMOL/L
ANISOCYTOSIS BLD QL SMEAR: SLIGHT
AST SERPL-CCNC: 69 U/L
BASOPHILS NFR BLD: 0 %
BILIRUB SERPL-MCNC: 1.2 MG/DL
BUN SERPL-MCNC: 38 MG/DL
BURR CELLS BLD QL SMEAR: ABNORMAL
CALCIUM SERPL-MCNC: 8.4 MG/DL
CHLORIDE SERPL-SCNC: 108 MMOL/L
CO2 SERPL-SCNC: 22 MMOL/L
CREAT SERPL-MCNC: 2.1 MG/DL
DACRYOCYTES BLD QL SMEAR: ABNORMAL
DAT IGG-SP REAG RBC-IMP: NORMAL
DIFFERENTIAL METHOD: ABNORMAL
EOSINOPHIL NFR BLD: 1 %
ERYTHROCYTE [DISTWIDTH] IN BLOOD BY AUTOMATED COUNT: 17.8 %
EST. GFR  (AFRICAN AMERICAN): 25.6 ML/MIN/1.73 M^2
EST. GFR  (NON AFRICAN AMERICAN): 22.2 ML/MIN/1.73 M^2
GLUCOSE SERPL-MCNC: 139 MG/DL
HCT VFR BLD AUTO: 23.6 %
HGB BLD-MCNC: 7.6 G/DL
HYPOCHROMIA BLD QL SMEAR: ABNORMAL
IMM GRANULOCYTES # BLD AUTO: ABNORMAL K/UL
IMM GRANULOCYTES NFR BLD AUTO: ABNORMAL %
INR PPP: 1.4
LYMPHOCYTES NFR BLD: 4.5 %
MAGNESIUM SERPL-MCNC: 1.6 MG/DL
MCH RBC QN AUTO: 28.9 PG
MCHC RBC AUTO-ENTMCNC: 32.2 G/DL
MCV RBC AUTO: 90 FL
METAMYELOCYTES NFR BLD MANUAL: 2 %
MONOCYTES NFR BLD: 3 %
MYELOCYTES NFR BLD MANUAL: 4 %
NEUTROPHILS NFR BLD: 80 %
NRBC BLD-RTO: 3 /100 WBC
OVALOCYTES BLD QL SMEAR: ABNORMAL
PLATELET # BLD AUTO: 49 K/UL
PLATELET BLD QL SMEAR: ABNORMAL
PMV BLD AUTO: 12.4 FL
POIKILOCYTOSIS BLD QL SMEAR: SLIGHT
POLYCHROMASIA BLD QL SMEAR: ABNORMAL
POTASSIUM SERPL-SCNC: 3.1 MMOL/L
PROMYELOCYTES NFR BLD MANUAL: 1.5 %
PROT SERPL-MCNC: 6.5 G/DL
PROTHROMBIN TIME: 14.4 SEC
RBC # BLD AUTO: 2.63 M/UL
SODIUM SERPL-SCNC: 139 MMOL/L
STOMATOCYTES BLD QL SMEAR: PRESENT
URATE SERPL-MCNC: 6.7 MG/DL
VANCOMYCIN SERPL-MCNC: 9.5 UG/ML
WBC # BLD AUTO: 123.9 K/UL
WBC OTHER NFR BLD MANUAL: 4 %

## 2018-06-01 PROCEDURE — 93005 ELECTROCARDIOGRAM TRACING: CPT

## 2018-06-01 PROCEDURE — 80202 ASSAY OF VANCOMYCIN: CPT

## 2018-06-01 PROCEDURE — 25000003 PHARM REV CODE 250: Performed by: HOSPITALIST

## 2018-06-01 PROCEDURE — 86920 COMPATIBILITY TEST SPIN: CPT

## 2018-06-01 PROCEDURE — 92610 EVALUATE SWALLOWING FUNCTION: CPT

## 2018-06-01 PROCEDURE — 97166 OT EVAL MOD COMPLEX 45 MIN: CPT

## 2018-06-01 PROCEDURE — 36415 COLL VENOUS BLD VENIPUNCTURE: CPT

## 2018-06-01 PROCEDURE — 85027 COMPLETE CBC AUTOMATED: CPT

## 2018-06-01 PROCEDURE — G8997 SWALLOW GOAL STATUS: HCPCS | Mod: CI

## 2018-06-01 PROCEDURE — 25000003 PHARM REV CODE 250: Performed by: INTERNAL MEDICINE

## 2018-06-01 PROCEDURE — 20600001 HC STEP DOWN PRIVATE ROOM

## 2018-06-01 PROCEDURE — 63600175 PHARM REV CODE 636 W HCPCS: Performed by: HOSPITALIST

## 2018-06-01 PROCEDURE — 85610 PROTHROMBIN TIME: CPT

## 2018-06-01 PROCEDURE — 93010 ELECTROCARDIOGRAM REPORT: CPT | Mod: ,,, | Performed by: INTERNAL MEDICINE

## 2018-06-01 PROCEDURE — 86880 COOMBS TEST DIRECT: CPT

## 2018-06-01 PROCEDURE — 85007 BL SMEAR W/DIFF WBC COUNT: CPT

## 2018-06-01 PROCEDURE — 80053 COMPREHEN METABOLIC PANEL: CPT

## 2018-06-01 PROCEDURE — 83735 ASSAY OF MAGNESIUM: CPT

## 2018-06-01 PROCEDURE — 99233 SBSQ HOSP IP/OBS HIGH 50: CPT | Mod: ,,, | Performed by: INTERNAL MEDICINE

## 2018-06-01 PROCEDURE — 84550 ASSAY OF BLOOD/URIC ACID: CPT

## 2018-06-01 PROCEDURE — G8996 SWALLOW CURRENT STATUS: HCPCS | Mod: CI

## 2018-06-01 RX ORDER — DEXTROSE MONOHYDRATE, SODIUM CHLORIDE, AND POTASSIUM CHLORIDE 50; 1.49; 4.5 G/1000ML; G/1000ML; G/1000ML
INJECTION, SOLUTION INTRAVENOUS CONTINUOUS
Status: DISCONTINUED | OUTPATIENT
Start: 2018-06-01 | End: 2018-06-04

## 2018-06-01 RX ORDER — MAGNESIUM SULFATE HEPTAHYDRATE 40 MG/ML
2 INJECTION, SOLUTION INTRAVENOUS ONCE
Status: COMPLETED | OUTPATIENT
Start: 2018-06-01 | End: 2018-06-01

## 2018-06-01 RX ORDER — HYDROXYUREA 500 MG/1
1000 CAPSULE ORAL 2 TIMES DAILY
Status: DISCONTINUED | OUTPATIENT
Start: 2018-06-01 | End: 2018-06-01

## 2018-06-01 RX ORDER — POTASSIUM CHLORIDE 20 MEQ/15ML
20 SOLUTION ORAL ONCE
Status: COMPLETED | OUTPATIENT
Start: 2018-06-01 | End: 2018-06-01

## 2018-06-01 RX ORDER — VANCOMYCIN/0.9 % SOD CHLORIDE 1 G/100 ML
1000 PLASTIC BAG, INJECTION (ML) INTRAVENOUS ONCE
Status: COMPLETED | OUTPATIENT
Start: 2018-06-01 | End: 2018-06-01

## 2018-06-01 RX ORDER — VANCOMYCIN/0.9 % SOD CHLORIDE 1 G/100 ML
1000 PLASTIC BAG, INJECTION (ML) INTRAVENOUS
Status: DISCONTINUED | OUTPATIENT
Start: 2018-06-01 | End: 2018-06-01

## 2018-06-01 RX ADMIN — DEXTROSE MONOHYDRATE, SODIUM CHLORIDE, AND POTASSIUM CHLORIDE: 50; 4.5; 1.49 INJECTION, SOLUTION INTRAVENOUS at 08:06

## 2018-06-01 RX ADMIN — VANCOMYCIN HYDROCHLORIDE 1 G: 10 INJECTION, POWDER, LYOPHILIZED, FOR SOLUTION INTRAVENOUS at 09:06

## 2018-06-01 RX ADMIN — POTASSIUM CHLORIDE 20 MEQ: 20 SOLUTION ORAL at 09:06

## 2018-06-01 RX ADMIN — SODIUM CHLORIDE 500 ML: 0.9 INJECTION, SOLUTION INTRAVENOUS at 04:06

## 2018-06-01 RX ADMIN — MAGNESIUM SULFATE IN WATER 2 G: 40 INJECTION, SOLUTION INTRAVENOUS at 12:06

## 2018-06-01 RX ADMIN — HEPARIN SODIUM 5000 UNITS: 5000 INJECTION, SOLUTION INTRAVENOUS; SUBCUTANEOUS at 05:06

## 2018-06-01 RX ADMIN — ALLOPURINOL 100 MG: 100 TABLET ORAL at 08:06

## 2018-06-01 RX ADMIN — CEFEPIME HYDROCHLORIDE 2 G: 2 INJECTION, POWDER, FOR SOLUTION INTRAVENOUS at 08:06

## 2018-06-01 NOTE — PT/OT/SLP EVAL
"Speech Language Pathology Evaluation  Bedside Swallow    Patient Name:  Courtney Hammer   MRN:  [id number]   [id number]    Admitting Diagnosis: Sepsis with acute organ dysfunction    Recommendations:                 General Recommendations:  Dysphagia therapy  Diet recommendations:  Regular, Thin   Aspiration Precautions:   · NO straws  · Fully awake and alert for PO intake  · Fully upright position for PO intake  · Small bites/ sips  · Slow rate of eating/ drinking  · 1 bite/ sip @ a time  · Refrain from talking prior to swallow completion   · Remain upright for 20-30 min post PO intake  · Discontinue PO upon: coughing, throat clearing, choking, wet vocal quality, wet breath sounds, watery eyes, reddened facial features  General Precautions: Standard, aspiration, fall, respiratory  Communication strategies:  go to room if call light pushed    History:     Past Medical History:   Diagnosis Date    Anemia     Bone marrow disorder     Cataract     Encounter for blood transfusion     Hypertension     Sickle cell trait        Past Surgical History:   Procedure Laterality Date     SECTION      HYSTERECTOMY       Chest X-Rays: Per review of medical chart, chest x-ray completed 18 indicative of the following: "Findings most likely represent a developing right lower lobe infiltrate.  Follow-up study suggested to document complete resolution."     Prior diet: Via head nod response to y/n q', pt indicated regular consistency diet and thin liquids.     Subjective     "Ahhhh" Pt vocalized upon request throughout evaluation.     Pain/Comfort:  · Pain Rating 1: 0/10  · Pain Rating Post-Intervention 1: 0/10    Objective:     Oral Musculature Evaluation  · Oral Musculature: WFL  · Dentition: present and adequate  · Secretion Management: adequate  · Mucosal Quality: good  · Mandibular Strength and Mobility: WFL  · Oral Labial Strength and Mobility: WFL, other (see comments) (R lower labial surface observed with dried " blood)  · Lingual Strength and Mobility: functional protrusion, other (see comments) (Functional R lateralization. DENG L lateralization 2/2 dec'd comprehension. )  · Buccal Strength and Mobility: other (see comments) (DENG 2/2 dec'd comprehension)  · Volitional Cough: Strength WFL. Congested.   · Volitional Swallow: DENG 2/2 dec'd understanding   · Voice Prior to PO Intake: Clear/ dry     Bedside Swallow Eval:   Consistencies Assessed:  · Thin water- tsp x2, cup sip in isolation x4, cup sip as pureed liquid wash x1, cup sip as regular liquid wash x2, straw sip in isolation x3, straw sip as regular liquid wash x2-3  · Pureed apple sauce- tsp x1  · Regular consistency liana cracker- bite x6    Oral Phase:   · Appeared WFL     Pharyngeal Phase:   · Prior to provision of PO trials, congested spontaneous coughing noted x4 within <10min   · Vocal quality clear and dry throughout session, including upon observation of congested coughing   · Congested coughing of unchanged quality compared to those noted prior to provision of PO trials observed with the following PO trials provided across ~14min:  · Tsp thin water x1  · Cup sip water in isolation x1  · Cup sip as pureed liquid wash x1  · Cup sip as regular consistency liquid wash x1  · During mastication of regular consistency liana cracker x1  · Straw sip as regular consistency liquid wash x1-2  · Given frequency of congested coughing during PO trials did not appear to increase, quality of cough noted during PO trials unchanged from that observed prior to provision of PO trials, and vocal quality remained clear and dry throughout session, coughing with PO trials appears likely unrelated to PO. However aspiration is unable to be ruled out.   · No further overt clinical signs of aspiration observed    Treatment:   Prior to initiation of evaluation, NSG reported pt's RLL PNA thought likely to be community acquired.     Pt awake upon entry. Repositioned and HOB raised.  Education provided re: role of SLP, SLP POC, and consistent implementation of the above listed aspiration precautions. Although pt vocalized upon request throughout session for observation of vocal quality post nearly all PO trials, verbalizations unappreciated. Head shake/ nod response to y/n q's appreciated. However reliability of response unable to be determined. Concern remains for pt's complete understanding of all education provided. White board updated.     Results discussed with NSG, including inability to rule out aspiration given frequency of spontaneous congested coughing observed prior to provision of PO trials.    Assessment:     Courtney Hammer is a 77 y.o. female with an SLP diagnosis of risk for aspiration.     Goals:    SLP Goals        Problem: SLP Goal    Goal Priority Disciplines Outcome   SLP Goal     SLP Ongoing (interventions implemented as appropriate)   Description:  Speech Language Pathology  Goal expected to be met by 6/8:  1. Pt will tolerate regular consistency diet and thin liquids with no overt clinical signs of aspiration.                     Plan:     · Patient to be seen:  5 x/week   · Plan of Care expires:  06/30/18  · Plan of Care reviewed with:  patient   · SLP Follow-Up:  Yes       Discharge recommendations:  other (see comments) (Pending progress)     Time Tracking:     SLP Treatment Date:   06/01/18  Speech Start Time:  1139  Speech Stop Time:  1203     Speech Total Time (min):  24 min    Billable Minutes: Eval Swallow and Oral Function 24    ANNE rBown, CCC-SLP  [phone]  6/1/2018

## 2018-06-01 NOTE — PLAN OF CARE
Problem: SLP Goal  Goal: SLP Goal  Outcome: Ongoing (interventions implemented as appropriate)  Clinical evaluation of swallow complete. Recommend regular consistency diet and thin liquids. NO straws.     ANNE Brown, CCC-SLP  [phone]  6/1/2018

## 2018-06-01 NOTE — PLAN OF CARE
Problem: Patient Care Overview  Goal: Plan of Care Review  Outcome: Ongoing (interventions implemented as appropriate)  Patient is very limited in her verbal responses; she opens her eyes spontaneously, and obeys commands as far as turning, etc. VSS, afebrile. Hypotensive this afternoon at 98/50. Dr. Oliver notified. 500 bolus NS ordered. Fall precautions maintained. Patient instructed on how to contact the nurse. Rounds done hourly.  Patient without distress on 3.5L NC. Speech performed swallow study with the patient. Patient advanced to regular diet. Magnesium replaced IV. Maintenance fluids initiated. IV vanc and cefepime continued. No complaints of pain or nausea. Continuous cardiac monitoring continued; heart rate in the 110s. Questions and concerns have been addressed; will continue to monitor.

## 2018-06-01 NOTE — PLAN OF CARE
Problem: Occupational Therapy Goal  Goal: Occupational Therapy Goal  Goals to be met by 6/15/18    Likely more focus on family education/recs pending further medical w/u and prognosis      UE Dressing with Moderate Assistance.  Grooming while EOB with Moderate Assistance.  Sitting at edge of bed x15 min minutes with Stand-by Assistance.  Pt will participate in BUE/BLE exercises to prevent decreased ROM/strenght for increased ability to assist with ADL's and for decreased caregiver burden  Family will be educated on ROM, mobility techniques and positioning, DME rec's, ADL education as appropriate  Outcome: Ongoing (interventions implemented as appropriate)  Goals established  Chiara Rosas, OTR

## 2018-06-01 NOTE — NURSING
Pt arrived via stretcher from ED to oncology .  Pt is non verbal.  Does open eyes spontaneously, but then closes eyes and goes back to sleep.  Family at the bedside who was able to give this RN some history on the pt.  PIV's are intact and free of infection.  Cardiac monitoring in place, ST.  Bed alarm is on for pt safety.  Pt on 3.5L NC with SPO2 93-96%.  Pt got 1 unit of blood in the ED.  Bed is in lowest position, call bell is in reach, and pt instructed to call nurse when needing assistance.  Will continue to monitor.

## 2018-06-01 NOTE — CARE UPDATE
RN Proactive Rounding Note  Time of Visit: 08:15    Admit Date: 2018  LOS: 1  Code Status: Full Code   Date of Visit: 2018  : 1940  Age: 77 y.o.  Sex: female  Bed: [facility]A:   MRN: [id number]  Was the patient discharged from an ICU this admission? no   Was the patient discharged from a PACU within last 24 hours? no  Did the patient receive conscious sedation/general anesthesia in last 24 hours? no  Was the patient in the ED within the past 24 hours? yes  Was the patient started on NIPPV within the past 24 hours? no  Attending Physician: Eddi Fernández MD  Primary Service: [facility] BMT      ASSESSMENT:     Abnormal Vital Signs: , lethargic   Clinical Issues: Circulatory     INTERVENTIONS/ RECOMMENDATIONS:     Patient seen on proactive rounds for tachycardia and altered mental status.   On bedside exam, patient alert in bed, minimal verbal talking.   Portable probe reveals sats 96% on NC, .   BMT aware of status. Will evaluate this am.   No acute issues at this time, patient currently hemodynamically stable.     Discussed plan of care with LETY Bermudez     PHYSICIAN ESCALATION:     Yes/No no    Orders received and case discussed with   n/a     Disposition: ONC     FOLLOW-UP/CONTINGENCY:       Call back the Rapid Response Nurse at x [phone]  for additional questions or concerns

## 2018-06-01 NOTE — PROGRESS NOTES
[facility]  Hematology  Bone Marrow Transplant  Progress Note    Patient Name: Courtney Hammer  Admission Date: 5/31/2018  Hospital Length of Stay: 1 days  Code Status: Full Code    Subjective:     Interval History: Admitted to BMT. Remained lethargic most of shift but improving overnight; MRI brain negative for CVA. On broad spectrum abx for pneumonia. Said Hi this morning but otherwise non-verbal    Current ROS: unable to complete due to non-verbal status    Objective:     Vital Signs (Most Recent):  Temp: 100.3 °F (37.9 °C) (05/31/18 0908)  Pulse: (!) 115 (05/31/18 1246)  Resp: 20 (05/31/18 1246)  BP: (!) 107/58 (05/31/18 1246)  SpO2: 99 % (05/31/18 1246) Vital Signs (24h Range):  Temp:  [99.7 °F (37.6 °C)-101.1 °F (38.4 °C)] 100.3 °F (37.9 °C)  Pulse:  [] 115  Resp:  [16-27] 20  SpO2:  [90 %-100 %] 99 %  BP: ()/(51-63) 107/58     Weight: 39.9 kg (88 lb)  Body mass index is 17.77 kg/m².  Body surface area is 1.29 meters squared.    ECOG SCORE         [unfilled]    Intake/Output - Last 3 Shifts       05/29 0700 - 05/30 0659 05/30 0700 - 05/31 0659 05/31 0700 - 06/01 0659    IV Piggyback   2197    Total Intake(mL/kg)   2197 (55.1)    Net     +2197                 Physical Exam   Constitutional: She appears cachectic. She is uncooperative. She appears ill.   HENT:   Head: Normocephalic and atraumatic.   Eyes: Conjunctivae are normal. Pupils are equal, round, and reactive to light.   Neck: No tracheal deviation present.   Cardiovascular: Regular rhythm.  Tachycardia present.    Pulmonary/Chest: No stridor. No respiratory distress. She has no wheezes. She has no rales.   Abdominal: Soft. She exhibits mass (splenomegaly). She exhibits no distension. There is no tenderness.   Musculoskeletal: She exhibits no edema or tenderness.   Neurological: She is alert.   Alert, does not follow commands appropriately, does not participate with exam.   Skin: Skin is warm and dry.       Significant Labs:    CBC:   Recent Labs  Lab 05/31/18  0830   .30*   HGB 6.3*   HCT 19.8*   PLT 70*    and CMP:   Recent Labs  Lab 05/31/18  0830      K 3.9      CO2 21*   *   BUN 43*   CREATININE 2.6*   CALCIUM 9.4   PROT 7.5   ALBUMIN 2.6*   BILITOT 1.3*   ALKPHOS 102   AST 90*   ALT 31   ANIONGAP 14   EGFRNONAA 17.2*       Diagnostic Results:  CxR: Interstitial infiltrates RLL    Assessment/Plan:     * Sepsis with acute organ dysfunction    Presents with mental status change, FELICE and fevers; improving  -Sources at present include RLL PNA vs UTI vs less likely CNS (no meningeal signs)  -Organ dysfunction of mental status change and FELICE  -4/4 SIRS, 2/3 qSOFA, LA 2.7, PCT 5  -s/p 30cc/kg bous  -Continue Vanc, Cefepime    -Evaluated by MICU who recommends floor admission, appreciate help        Encephalopathy, metabolic    Likely 2/2 sepsis but high risk for clots 2/2 disease  -Recommend ABG to further evaluate  -Recommend MRI Brain to r/o CVA        Acute renal failure superimposed on stage 3 chronic kidney disease    Likely 2/2 sepsis, s/p 30cc/kg, monitor for improvement, if not improved consider US retroperitoneal and urine lytes        Pneumonia of right lower lobe due to infectious organism    -See sepsis        Myeloproliferative disorder    Oncologic History per HPI  -Hold jakafi, restart hydrea 1g BID  -Transfuse for Hgb < 7, plt < 10k unless active bleed found  -No evidence of transformation on last marrow, but high risk for AML transformation        Physical debility    PT/OT ordered        Anemia    See myeloproliferative d/o            VTE Risk Mitigation         Ordered     heparin (porcine) injection 5,000 Units  Every 8 hours      05/31/18 1515     IP VTE HIGH RISK PATIENT  Once      05/31/18 1515          Disposition: pending    Watson Luong IV, MD  Bone Marrow Transplant  [facility]

## 2018-06-01 NOTE — PROGRESS NOTES
06/01/18 1605   Vital Signs   Temp 98.7 °F (37.1 °C)   Temp src Oral   Pulse 109   Heart Rate Source Monitor   Resp 17   SpO2 98 %   O2 Device (Oxygen Therapy) nasal cannula   BP (!) 83/51   MAP (mmHg) 62   BP Location Left arm   Patient Position Lying       Dr. Oliver notified of vital signs. Nurse assessed manual BP at98/50.  NS bolus ordered. Will continue to monitor.

## 2018-06-01 NOTE — PT/OT/SLP EVAL
Occupational Therapy   Evaluation    Name: Courtney Hammer  MRN: [id number]  Admitting Diagnosis:  Sepsis with acute organ dysfunction      Recommendations:     Discharge Recommendations:  (pending progress/prognosis/medical w/u)  Discharge Equipment Recommendations:   (con't to assess)  Barriers to discharge:   (con't to assess)    History:     Occupational Profile:  Living Environment: lives in  home with 5 steps to enter with son and granddaughter per nephew who is visiting from [place]  Previous level of function: assist with ADL's over last ~2 mos per nephew at bedside, mainly bedbound-he doesn't live with the pt. And is visiting from [place]- need to clarify current information  Roles and Routines: none noted  Equipment Owned:   (unclear at this time, family in room was u/a to report- per old PT note 3 mos ago pt has cane and BsC)  Assistance upon Discharge: unclear    Past Medical History:   Diagnosis Date    Anemia     Bone marrow disorder     Cataract     Encounter for blood transfusion     Hypertension     Sickle cell trait        Past Surgical History:   Procedure Laterality Date     SECTION      HYSTERECTOMY         Subjective     Chief Complaint: pt did not state   Patient/Family stated goals: none stated, nephew bedside and u/a to state goals for family members  Communicated with: nsg prior to session.  Pain/Comfort:  · Pain Rating 1:  (none noted)    Patients cultural, spiritual, Gnosticist conflicts given the current situation: none    Objective:     Patient found with: telemetry (diaper)    General Precautions: Standard, fall, aspiration (skin)   Orthopedic Precautions:    Braces:       Occupational Performance:    Bed Mobility:    · Total assist sup to sit    Functional Mobility/Transfers:  · Total assist sit to stand, significant posterior lean in standing   · Functional Mobility: scooting EOB x 2 with total assist     Activities of Daily Living:  ·  total assist simple grooming  "task    Cognitive/Visual Perceptual:  Pt followed simple command to squeeze therapist hand x 2 with demonstration/gestural cues, followed command to reach to therapist hand with BUE's with demo and min assist to initiate task, would not state full name or any other orientation questions, u/a to participate or follow commands to assist with mobility/sup to sit/sit to stand    Physical Exam:  Per observation, in supine, grossly wfl BUE's; sitting EOB static balance with CGA    Patient left supine with bed alarm on and nephew and grandniece present present    AMPA 6 Click:  [facility] Total Score: 6    Treatment & Education:  Performed above activity, educated family at bedside on POC, f/u for possible home needs, education, other therapeutic activities as able/appropriate.   Education:    Assessment:     Courtney Hammer is a 77 y.o. female with a medical diagnosis of Sepsis with acute organ dysfunction.  She presents with poor ability to participate/progress with ADL's/ mobility at this time due to cognition.  Performance deficits affecting function are weakness, impaired endurance, impaired self care skills, impaired cognition, impaired functional mobilty, impaired balance, decreased upper extremity function, decreased lower extremity function, impaired cardiopulmonary response to activity.      Rehab Prognosis:  Good for education of family, poor for significant goal achievement with ADL status at this time; patient would benefit from acute skilled OT services to address these deficits and reach maximum level of function.         Clinical Decision Makin.  OT Mod:  "Pt evaluation falls under moderate complexity for evaluation coding due to identification of 3-5 performance deficits noted as stated above. Eval required Min/Mod assistance to complete on this date and detailed assessment(s) were utilized. Moreover, an expanded review of history and occupational profile obtained with additional review of cognitive, " "physical and psychosocial hx."     Plan:     Patient to be seen 3 x/week to address the above listed problems via self-care/home management, therapeutic activities, therapeutic exercises  · Plan of Care Expires: 07/01/18  · Plan of Care Reviewed with:      This Plan of care has been discussed with the patient who was involved in its development and understands and is in agreement with the identified goals and treatment plan    GOALS:    Occupational Therapy Goals        Problem: Occupational Therapy Goal    Goal Priority Disciplines Outcome Interventions   Occupational Therapy Goal     OT, PT/OT Ongoing (interventions implemented as appropriate)    Description:  Goals to be met by 6/15/18    Likely more focus on family education/recs pending further medical w/u and prognosis      UE Dressing with Moderate Assistance.  Grooming while EOB with Moderate Assistance.  Sitting at edge of bed x15 min minutes with Stand-by Assistance.  Pt will participate in BUE/BLE exercises to prevent decreased ROM/strenght for increased ability to assist with ADL's and for decreased caregiver burden  Family will be educated on ROM, mobility techniques and positioning, DME rec's, ADL education as appropriate                    Time Tracking:     OT Date of Treatment: 06/01/18  OT Start Time: 0956  OT Stop Time: 1010  OT Total Time (min): 14 min    Billable Minutes:Evaluation 14 min    Chiara Rosas OT  6/1/2018    "

## 2018-06-02 LAB
ALBUMIN SERPL BCP-MCNC: 2 G/DL
ALP SERPL-CCNC: 92 U/L
ALT SERPL W/O P-5'-P-CCNC: 22 U/L
ANION GAP SERPL CALC-SCNC: 8 MMOL/L
ANISOCYTOSIS BLD QL SMEAR: SLIGHT
ANISOCYTOSIS BLD QL SMEAR: SLIGHT
AST SERPL-CCNC: 64 U/L
BASO STIPL BLD QL SMEAR: ABNORMAL
BASOPHILS # BLD AUTO: ABNORMAL K/UL
BASOPHILS NFR BLD: 0 %
BASOPHILS NFR BLD: 0 %
BILIRUB SERPL-MCNC: 1.2 MG/DL
BLASTS NFR BLD MANUAL: 4.5 %
BLD PROD TYP BPU: NORMAL
BLOOD UNIT EXPIRATION DATE: NORMAL
BLOOD UNIT TYPE CODE: 5100
BLOOD UNIT TYPE: NORMAL
BUN SERPL-MCNC: 32 MG/DL
CALCIUM SERPL-MCNC: 8.1 MG/DL
CHLORIDE SERPL-SCNC: 107 MMOL/L
CO2 SERPL-SCNC: 20 MMOL/L
CODING SYSTEM: NORMAL
CREAT SERPL-MCNC: 1.7 MG/DL
DACRYOCYTES BLD QL SMEAR: ABNORMAL
DIFFERENTIAL METHOD: ABNORMAL
DIFFERENTIAL METHOD: ABNORMAL
DISPENSE STATUS: NORMAL
EOSINOPHIL # BLD AUTO: ABNORMAL K/UL
EOSINOPHIL NFR BLD: 0 %
EOSINOPHIL NFR BLD: 0 %
ERYTHROCYTE [DISTWIDTH] IN BLOOD BY AUTOMATED COUNT: 17 %
ERYTHROCYTE [DISTWIDTH] IN BLOOD BY AUTOMATED COUNT: 17.8 %
EST. GFR  (AFRICAN AMERICAN): 33.1 ML/MIN/1.73 M^2
EST. GFR  (NON AFRICAN AMERICAN): 28.7 ML/MIN/1.73 M^2
GLUCOSE SERPL-MCNC: 107 MG/DL
HCT VFR BLD AUTO: 20.9 %
HCT VFR BLD AUTO: 24.8 %
HGB BLD-MCNC: 6.9 G/DL
HGB BLD-MCNC: 8.2 G/DL
HYPOCHROMIA BLD QL SMEAR: ABNORMAL
IMM GRANULOCYTES # BLD AUTO: ABNORMAL K/UL
IMM GRANULOCYTES # BLD AUTO: ABNORMAL K/UL
IMM GRANULOCYTES NFR BLD AUTO: ABNORMAL %
IMM GRANULOCYTES NFR BLD AUTO: ABNORMAL %
INR PPP: 1.4
LYMPHOCYTES # BLD AUTO: ABNORMAL K/UL
LYMPHOCYTES NFR BLD: 2 %
LYMPHOCYTES NFR BLD: 4.5 %
MAGNESIUM SERPL-MCNC: 2.2 MG/DL
MCH RBC QN AUTO: 29.1 PG
MCH RBC QN AUTO: 29.2 PG
MCHC RBC AUTO-ENTMCNC: 33 G/DL
MCHC RBC AUTO-ENTMCNC: 33.1 G/DL
MCV RBC AUTO: 88 FL
MCV RBC AUTO: 89 FL
METAMYELOCYTES NFR BLD MANUAL: 4 %
METAMYELOCYTES NFR BLD MANUAL: 5.5 %
MONOCYTES # BLD AUTO: ABNORMAL K/UL
MONOCYTES NFR BLD: 2.5 %
MONOCYTES NFR BLD: 5 %
MYELOCYTES NFR BLD MANUAL: 6 %
MYELOCYTES NFR BLD MANUAL: 6.5 %
NEUTROPHILS NFR BLD: 70 %
NEUTROPHILS NFR BLD: 80 %
NEUTS BAND NFR BLD MANUAL: 1.5 %
NEUTS BAND NFR BLD MANUAL: 3 %
NRBC BLD-RTO: 2 /100 WBC
NRBC BLD-RTO: 3 /100 WBC
NUM UNITS TRANS PACKED RBC: NORMAL
OVALOCYTES BLD QL SMEAR: ABNORMAL
PLATELET # BLD AUTO: 49 K/UL
PLATELET # BLD AUTO: 54 K/UL
PLATELET BLD QL SMEAR: ABNORMAL
PMV BLD AUTO: ABNORMAL FL
PMV BLD AUTO: ABNORMAL FL
POIKILOCYTOSIS BLD QL SMEAR: SLIGHT
POIKILOCYTOSIS BLD QL SMEAR: SLIGHT
POLYCHROMASIA BLD QL SMEAR: ABNORMAL
POLYCHROMASIA BLD QL SMEAR: ABNORMAL
POTASSIUM SERPL-SCNC: 3.3 MMOL/L
PROMYELOCYTES NFR BLD MANUAL: 5 %
PROT SERPL-MCNC: 6.2 G/DL
PROTHROMBIN TIME: 13.8 SEC
RBC # BLD AUTO: 2.36 M/UL
RBC # BLD AUTO: 2.82 M/UL
SODIUM SERPL-SCNC: 135 MMOL/L
URATE SERPL-MCNC: 5.8 MG/DL
VANCOMYCIN SERPL-MCNC: 19.1 UG/ML
WBC # BLD AUTO: 113.1 K/UL
WBC # BLD AUTO: 134.7 K/UL

## 2018-06-02 PROCEDURE — P9040 RBC LEUKOREDUCED IRRADIATED: HCPCS

## 2018-06-02 PROCEDURE — 86644 CMV ANTIBODY: CPT

## 2018-06-02 PROCEDURE — 63600175 PHARM REV CODE 636 W HCPCS: Performed by: HOSPITALIST

## 2018-06-02 PROCEDURE — 36415 COLL VENOUS BLD VENIPUNCTURE: CPT

## 2018-06-02 PROCEDURE — 85610 PROTHROMBIN TIME: CPT

## 2018-06-02 PROCEDURE — 85027 COMPLETE CBC AUTOMATED: CPT

## 2018-06-02 PROCEDURE — 80202 ASSAY OF VANCOMYCIN: CPT

## 2018-06-02 PROCEDURE — 83735 ASSAY OF MAGNESIUM: CPT

## 2018-06-02 PROCEDURE — 85007 BL SMEAR W/DIFF WBC COUNT: CPT

## 2018-06-02 PROCEDURE — 80053 COMPREHEN METABOLIC PANEL: CPT

## 2018-06-02 PROCEDURE — 25000003 PHARM REV CODE 250: Performed by: STUDENT IN AN ORGANIZED HEALTH CARE EDUCATION/TRAINING PROGRAM

## 2018-06-02 PROCEDURE — 25000003 PHARM REV CODE 250: Performed by: HOSPITALIST

## 2018-06-02 PROCEDURE — 36430 TRANSFUSION BLD/BLD COMPNT: CPT

## 2018-06-02 PROCEDURE — 84550 ASSAY OF BLOOD/URIC ACID: CPT

## 2018-06-02 PROCEDURE — 25000003 PHARM REV CODE 250: Performed by: INTERNAL MEDICINE

## 2018-06-02 PROCEDURE — 97530 THERAPEUTIC ACTIVITIES: CPT

## 2018-06-02 PROCEDURE — 97161 PT EVAL LOW COMPLEX 20 MIN: CPT

## 2018-06-02 PROCEDURE — 20600001 HC STEP DOWN PRIVATE ROOM

## 2018-06-02 PROCEDURE — 99233 SBSQ HOSP IP/OBS HIGH 50: CPT | Mod: ,,, | Performed by: INTERNAL MEDICINE

## 2018-06-02 RX ORDER — POTASSIUM CHLORIDE 20 MEQ/15ML
40 SOLUTION ORAL ONCE
Status: COMPLETED | OUTPATIENT
Start: 2018-06-02 | End: 2018-06-02

## 2018-06-02 RX ORDER — DIPHENHYDRAMINE HCL 12.5MG/5ML
12.5 ELIXIR ORAL EVERY 6 HOURS PRN
Status: DISCONTINUED | OUTPATIENT
Start: 2018-06-02 | End: 2018-06-02

## 2018-06-02 RX ORDER — GUAIFENESIN 100 MG/5ML
200 SOLUTION ORAL EVERY 6 HOURS
Status: DISCONTINUED | OUTPATIENT
Start: 2018-06-02 | End: 2018-06-06 | Stop reason: HOSPADM

## 2018-06-02 RX ORDER — BENZONATATE 100 MG/1
100 CAPSULE ORAL 3 TIMES DAILY PRN
Status: DISCONTINUED | OUTPATIENT
Start: 2018-06-02 | End: 2018-06-06 | Stop reason: HOSPADM

## 2018-06-02 RX ORDER — ACETAMINOPHEN 325 MG/1
650 TABLET ORAL EVERY 6 HOURS PRN
Status: DISCONTINUED | OUTPATIENT
Start: 2018-06-02 | End: 2018-06-06 | Stop reason: HOSPADM

## 2018-06-02 RX ORDER — GUAIFENESIN 100 MG/5ML
200 SOLUTION ORAL EVERY 4 HOURS PRN
Status: DISCONTINUED | OUTPATIENT
Start: 2018-06-02 | End: 2018-06-02

## 2018-06-02 RX ORDER — MOXIFLOXACIN HYDROCHLORIDE 400 MG/1
400 TABLET ORAL DAILY
Status: COMPLETED | OUTPATIENT
Start: 2018-06-02 | End: 2018-06-06

## 2018-06-02 RX ADMIN — GUAIFENESIN 200 MG: 200 SOLUTION ORAL at 06:06

## 2018-06-02 RX ADMIN — VANCOMYCIN HYDROCHLORIDE 750 MG: 750 INJECTION, POWDER, LYOPHILIZED, FOR SOLUTION INTRAVENOUS at 09:06

## 2018-06-02 RX ADMIN — GUAIFENESIN 200 MG: 200 SOLUTION ORAL at 10:06

## 2018-06-02 RX ADMIN — CEFEPIME HYDROCHLORIDE 2 G: 2 INJECTION, POWDER, FOR SOLUTION INTRAVENOUS at 08:06

## 2018-06-02 RX ADMIN — POTASSIUM CHLORIDE 40 MEQ: 20 SOLUTION ORAL at 07:06

## 2018-06-02 RX ADMIN — MOXIFLOXACIN HYDROCHLORIDE 400 MG: 400 TABLET, FILM COATED ORAL at 11:06

## 2018-06-02 RX ADMIN — NEOMYCIN AND POLYMYXIN B SULFATES AND BACITRACIN ZINC: 400; 3.5; 5 OINTMENT TOPICAL at 08:06

## 2018-06-02 RX ADMIN — DEXTROSE MONOHYDRATE, SODIUM CHLORIDE, AND POTASSIUM CHLORIDE: 50; 4.5; 1.49 INJECTION, SOLUTION INTRAVENOUS at 01:06

## 2018-06-02 RX ADMIN — NEOMYCIN AND POLYMYXIN B SULFATES AND BACITRACIN ZINC: 400; 3.5; 5 OINTMENT TOPICAL at 02:06

## 2018-06-02 RX ADMIN — DEXTROSE MONOHYDRATE, SODIUM CHLORIDE, AND POTASSIUM CHLORIDE: 50; 4.5; 1.49 INJECTION, SOLUTION INTRAVENOUS at 08:06

## 2018-06-02 RX ADMIN — ACETAMINOPHEN 650 MG: 325 TABLET, FILM COATED ORAL at 11:06

## 2018-06-02 RX ADMIN — ALLOPURINOL 100 MG: 100 TABLET ORAL at 08:06

## 2018-06-02 RX ADMIN — SODIUM CHLORIDE 500 ML: 9 INJECTION, SOLUTION INTRAVENOUS at 10:06

## 2018-06-02 RX ADMIN — GUAIFENESIN 200 MG: 200 SOLUTION ORAL at 11:06

## 2018-06-02 RX ADMIN — GUAIFENESIN 200 MG: 200 SOLUTION ORAL at 07:06

## 2018-06-02 NOTE — CARE UPDATE
Rapid Response Follow-up Note    Followed up with patient for proactive rounding.   No acute issues at this time. Pt tachycardic, HR 110s. Receiving 1 unit PRBC and 500 cc bolus of NS  Reviewed plan of care with primary RN.   Please call Rapid Response RN with any questions or concerns at  [phone]

## 2018-06-02 NOTE — SUBJECTIVE & OBJECTIVE
Subjective:     Interval History: No acute events. More awake today and responsive. Per family back to mental status baseline, but with some non productive cough    Unable to perform ROS 2/2 nonverbal    Objective:     Vital Signs (Most Recent):  Temp: 98.5 °F (36.9 °C) (06/02/18 0656)  Pulse: (!) 120 (06/02/18 0708)  Resp: 18 (06/02/18 0656)  BP: (!) 96/55 (06/02/18 0702)  SpO2: 100 % (06/02/18 0711) Vital Signs (24h Range):  Temp:  [98.1 °F (36.7 °C)-99 °F (37.2 °C)] 98.5 °F (36.9 °C)  Pulse:  [104-123] 120  Resp:  [17-20] 18  SpO2:  [93 %-100 %] 100 %  BP: ()/(50-56) 96/55     Weight: 39.9 kg (87 lb 15.4 oz)  Body mass index is 17.77 kg/m².  Body surface area is 1.29 meters squared.    ECOG SCORE         [unfilled]    Intake/Output - Last 3 Shifts       05/31 0700 - 06/01 0659 06/01 0700 - 06/02 0659 06/02 0700 - 06/03 0659    P.O. 0 120     Blood 531.7      IV Piggyback 2197      Total Intake(mL/kg) 2728.7 (68.4) 120 (3)     Net +2728.7 +120             Urine Occurrence 1 x 2 x     Stool Occurrence 0 x            Physical Exam   Constitutional: She appears cachectic. She is uncooperative. She appears ill.   HENT:   Head: Normocephalic and atraumatic.   Eyes: Conjunctivae are normal. Pupils are equal, round, and reactive to light.   Neck: No tracheal deviation present.   Cardiovascular: Regular rhythm.  Tachycardia present.    Pulmonary/Chest: No stridor. No respiratory distress. She has no wheezes. She has no rales.   Abdominal: Soft. She exhibits mass (splenomegaly). She exhibits no distension. There is no tenderness.   Musculoskeletal: She exhibits no edema or tenderness.   Neurological: She is alert.   Alert, follows simple 1 step commands most of time, no clear focal deficits but does not participate fully with exam.   Skin: Skin is warm and dry.       Significant Labs:   CBC:     Recent Labs  Lab 05/31/18  0830 06/01/18  0658 06/02/18  0434   .30* 123.90* 113.10*   HGB 6.3* 7.6* 6.9*   HCT  19.8* 23.6* 20.9*   PLT 70* 49* 49*    and CMP:     Recent Labs  Lab 05/31/18  0830 06/01/18  0658 06/02/18  0434    139 135*   K 3.9 3.1* 3.3*    108 107   CO2 21* 22* 20*   * 139* 107   BUN 43* 38* 32*   CREATININE 2.6* 2.1* 1.7*   CALCIUM 9.4 8.4* 8.1*   PROT 7.5 6.5 6.2   ALBUMIN 2.6* 2.2* 2.0*   BILITOT 1.3* 1.2* 1.2*   ALKPHOS 102 94 92   AST 90* 69* 64*   ALT 31 25 22   ANIONGAP 14 9 8   EGFRNONAA 17.2* 22.2* 28.7*       Diagnostic Results:  CxR: Interstitial infiltrates RLL

## 2018-06-02 NOTE — PROGRESS NOTES
Dr. Luong made aware that the patient's HR is elevated and sustaining in the 120s-130s. 500 ml bolus to be administered.

## 2018-06-02 NOTE — PLAN OF CARE
Problem: Physical Therapy Goal  Goal: Physical Therapy Goal  Goals to be met by: 2018     Patient will increase functional independence with mobility by performin. Supine to sit with MInimal Assistance  2. Sit to supine with MInimal Assistance  3. Sit to stand transfer with Moderate Assistance  4. Bed to chair transfer with Maximum Assistance  5. Sitting at edge of bed x20 minutes with Supervision  6. Lower extremity exercise program x30 reps per handout, with assistance as needed    Outcome: Ongoing (interventions implemented as appropriate)  Eval completed and POC established    Isiah Wilson DPT  2018

## 2018-06-02 NOTE — PROGRESS NOTES
[facility]  Hematology  Bone Marrow Transplant  Progress Note    Patient Name: Courtney Hammer  Admission Date: 5/31/2018  Hospital Length of Stay: 2 days  Code Status: Full Code    Subjective:     Interval History: No acute events. More awake today and responsive. Per family back to mental status baseline, but with some non productive cough    Unable to perform ROS 2/2 nonverbal    Objective:     Vital Signs (Most Recent):  Temp: 98.5 °F (36.9 °C) (06/02/18 0656)  Pulse: (!) 120 (06/02/18 0708)  Resp: 18 (06/02/18 0656)  BP: (!) 96/55 (06/02/18 0702)  SpO2: 100 % (06/02/18 0711) Vital Signs (24h Range):  Temp:  [98.1 °F (36.7 °C)-99 °F (37.2 °C)] 98.5 °F (36.9 °C)  Pulse:  [104-123] 120  Resp:  [17-20] 18  SpO2:  [93 %-100 %] 100 %  BP: ()/(50-56) 96/55     Weight: 39.9 kg (87 lb 15.4 oz)  Body mass index is 17.77 kg/m².  Body surface area is 1.29 meters squared.    ECOG SCORE         [unfilled]    Intake/Output - Last 3 Shifts       05/31 0700 - 06/01 0659 06/01 0700 - 06/02 0659 06/02 0700 - 06/03 0659    P.O. 0 120     Blood 531.7      IV Piggyback 2197      Total Intake(mL/kg) 2728.7 (68.4) 120 (3)     Net +2728.7 +120             Urine Occurrence 1 x 2 x     Stool Occurrence 0 x            Physical Exam   Constitutional: She appears cachectic. She is uncooperative. She appears ill.   HENT:   Head: Normocephalic and atraumatic.   Eyes: Conjunctivae are normal. Pupils are equal, round, and reactive to light.   Neck: No tracheal deviation present.   Cardiovascular: Regular rhythm.  Tachycardia present.    Pulmonary/Chest: No stridor. No respiratory distress. She has no wheezes. She has no rales.   Abdominal: Soft. She exhibits mass (splenomegaly). She exhibits no distension. There is no tenderness.   Musculoskeletal: She exhibits no edema or tenderness.   Neurological: She is alert.   Alert, follows simple 1 step commands most of time, no clear focal deficits but does not participate fully with  exam.   Skin: Skin is warm and dry.       Significant Labs:   CBC:     Recent Labs  Lab 05/31/18  0830 06/01/18  0658 06/02/18  0434   .30* 123.90* 113.10*   HGB 6.3* 7.6* 6.9*   HCT 19.8* 23.6* 20.9*   PLT 70* 49* 49*    and CMP:     Recent Labs  Lab 05/31/18  0830 06/01/18  0658 06/02/18  0434    139 135*   K 3.9 3.1* 3.3*    108 107   CO2 21* 22* 20*   * 139* 107   BUN 43* 38* 32*   CREATININE 2.6* 2.1* 1.7*   CALCIUM 9.4 8.4* 8.1*   PROT 7.5 6.5 6.2   ALBUMIN 2.6* 2.2* 2.0*   BILITOT 1.3* 1.2* 1.2*   ALKPHOS 102 94 92   AST 90* 69* 64*   ALT 31 25 22   ANIONGAP 14 9 8   EGFRNONAA 17.2* 22.2* 28.7*       Diagnostic Results:  CxR: Interstitial infiltrates RLL    Assessment/Plan:     * Sepsis with acute organ dysfunction     Presents with mental status change, FELICE and fevers; improving  -Sources at present include RLL PNA vs UTI vs less likely CNS (no meningeal signs)  -Organ dysfunction of mental status change and FELICE  -Admit 4/4 SIRS, 2/3 qSOFA, LA 2.7, PCT 5  -s/p 30cc/kg bous  -Continue Vanc, Cefepime; consider de-escalation to moxifloxacin vs augmentin po  -Evaluated by MICU who recommends floor admission, appreciate help        Encephalopathy, metabolic     Likely 2/2 sepsis; MRI brain negative, improving          Acute renal failure superimposed on stage 3 chronic kidney disease     Likely 2/2 sepsis, s/p 30cc/kg, improving        Pneumonia of right lower lobe due to infectious organism     -See sepsis        Myeloproliferative disorder     Oncologic History per HPI  -Hold jakafi, hydrea  -Transfuse for Hgb < 7, plt < 10k unless active bleed found  -No evidence of transformation on last marrow, but high risk for AML transformation        Physical debility     PT/OT seen but unable to recommend         Anemia     See myeloproliferative d/o         VTE Risk Mitigation         Ordered     IP VTE HIGH RISK PATIENT  Once      05/31/18 1192        Disposition: pending    Watson Luong IV,  MD  Bone Marrow Transplant  [facility]

## 2018-06-02 NOTE — CARE UPDATE
"Separate goals of care discussions with both Mr. Duy Hammer (spouse) and Ms. Maricruz Hammer (daughter) about Ms. Hammer.    I discussed with both Mr. Hammer and Ms. Hammer that Ms Courtney Hammer (patient) currently has a myeloproliferative disorder that has had difficulty treating due to side effects from medications she has taken as an outpatient. As discussed with Dr. Parmar on rounds with family, there are not good additional options that will provide a clear benefit in her care. We discussed that as the medical team our priority was Ms. Courtney Hammer and ultimately what she would have wanted in this situation. I discussed that she was improving from her pneumonia and we expected her to recover from it and be discharged from the hospital. I discussed that going forward the team would like to discuss and be on the same page regarding her cancer treatment. We discussed that without any good options or curative options, that often it becomes a discussion about how to proceed with treatment if she is likely to die from her disease or a complication of that disease. We discussed a range of options including continuing to discuss treatment options with an intention to prolong life or treat the disease, and that such options may have additional side effects or not actually prolong life as well as changing to a strategy of getting her home, around family, and treating any symptoms she has with the time she has left. We discussed that as the medical team ultimately we were here to support any decision and provide them with honest information about Ms. Hammer status. We discussed that hospice is not "giving up" or "abandoning" Ms. Courtney Hammer, but rather shifting a strategy to caring for her symptoms rather than treating the disease which she is ultimately is likely to die from or a complication of that disease, and that a team would be there for them either way. We discussed that there is no certain timeframe for when she would " die with either option or that either option would prolong life necessarily.     With Ms. Maricruz Hammer at [facility] she expressed that she thought her mother would most likely want hospice in this situation. Ms. Maricruz Hammer stated that she had thought about ultimately that MPD would ultimately take her mothers life and that she did not think she would continue to want all the blood draws, clinic visits, transfusions that she has currently been getting if it would not lead to a cure.     With Mr. Duy Hammer over [facility], he expressed that he was having trouble with the situation because he and his wife had never been  since being , and that she had never been sick till she was diagnosed with MPD. He stated that as a preacher he had experience with dying, hospice and situations like his wife's. He stated that he knew ultimately this disease would take her life, but not ready to make a decision yet.     Both Ms. Maricruz Hammer and Mr. Duy Hammer expressed gratitude for the discussions and work of the treatment teams. We discussed that the team would be here as we were treating Ms. Courtney Hammer's pneumonia to continue any of these conversations and reinforced that the number one priority was Ms. Courtney Hammer and what she would want in this kind of situation. Emotional support provided and all questions answered.     Watson Luong MD  Internal Medicine, PGY3

## 2018-06-02 NOTE — CARE UPDATE
Rapid Response Follow-up Note    Followed up with patient for proactive rounding.   No acute issues at this time. Vital signs within normal limits  Reviewed plan of care with primary RN.   Please call Rapid Response RN with any questions or concerns at  [phone]

## 2018-06-02 NOTE — PLAN OF CARE
Problem: Patient Care Overview  Goal: Plan of Care Review  Outcome: Ongoing (interventions implemented as appropriate)  Patient is very limited in her verbal responses; she opens her eyes spontaneously, and obeys commands as far as turning, etc. VSS, afebrile. Fall precautions maintained. Patient instructed on how to contact the nurse. Daughter at bedside. Rounds done hourly. Patient without distress on 3.5L NC. Patient advanced to regular diet with moderate appetite. Maintenance fluids continued. IV vanc and cefepime continued. No complaints of pain or nausea. Continuous cardiac monitoring continued; heart rate in the 120s-130s. Dr. uLong notified; 500 NS bolus administered. Patient received one unit PRBCs, tolerated well. Questions and concerns have been addressed; will continue to monitor.       Patient bladder scanned after asking to use the bed pan, but without any output. Scanned at >300. Dr. Luong notified. Patient on bedside commode to urinate, patient urinated 50 cc. Will continue to monitor.

## 2018-06-02 NOTE — CARE UPDATE
RN Proactive Rounding Note  Time of Visit:     Admit Date: 2018  LOS: 1  Code Status: Full Code   Date of Visit: 2018  : 1940  Age: 77 y.o.  Sex: female  Bed: [facility]A:   MRN: [id number]  Was the patient discharged from an ICU this admission? no   Was the patient discharged from a PACU within last 24 hours? no  Did the patient receive conscious sedation/general anesthesia in last 24 hours? no  Was the patient in the ED within the past 24 hours? no  Was the patient started on NIPPV within the past 24 hours? no  Attending Physician: Eddi Fernández MD  [place] [facility] [facility]      ASSESSMENT:     Abnormal Vital Signs: BP 97/54  Clinical Issues: Circulatory     INTERVENTIONS/ RECOMMENDATIONS:     Patient with baseline SBP in the 100s-120s, was hypotensive today 1600 with BP 83/51, requiring 500 ml NS bolus.  Upon assessment, patient is non-verbal, but following commands, appears pleasant and not in distress.  Daughter at bedside.  VS obtained, , /55 (77), O2 sat 98% on 3L NC.  No additional concerns at this time.    Discussed plan of care with Elma DAVIDSON (SRAVANTHI)    PHYSICIAN ESCALATION:     Yes/No no    Orders received and case discussed with   n/a     Disposition: remain on floor.    FOLLOW-UP/CONTINGENCY:       Call back the Rapid Response Nurse at [phone]  for additional questions or concerns

## 2018-06-02 NOTE — PT/OT/SLP EVAL
Physical Therapy Evaluation    Patient Name:  Courtney Hammer   MRN:  [id number]    Recommendations:     Discharge Recommendations:  nursing facility, skilled   Discharge Equipment Recommendations:  (TBD closer to D/C)   Barriers to discharge: Inaccessible home and Decreased caregiver support    Assessment:     Courtney Hammer is a 77 y.o. female admitted with a medical diagnosis of Sepsis with acute organ dysfunction.  She presents with the following impairments/functional limitations:  impaired functional mobilty, weakness, gait instability, impaired endurance, impaired balance, impaired self care skills, decreased lower extremity function, decreased upper extremity function, decreased ROM, decreased safety awareness, impaired cardiopulmonary response to activity.  Pt demo decreased functional mobility secondary to debility.  Pt alert and responded to 25% of questions verbally and able to answer all yes/no questions c head nods.  Pt requiring total - max A for functional mobility on this date.  Pt demo good static sitting balance but poor dynamic sitting balance requiring use of BUE for support d/t poor trunk control.  Pt and family encouraged to sit pt up on side of bed c assistance from nsg (communicated this to nsg).  Pt would benefit from continued skilled acute PT 4x/wk to improve functional mobility.  Recommending pt receive PT services in SNF setting following d/c from hospital once medically cleared.      Rehab Prognosis:  fair; patient would benefit from acute skilled PT services to address these deficits and reach maximum level of function.      Recent Surgery: * No surgery found *      Plan:     During this hospitalization, patient to be seen 4 x/week to address the above listed problems via gait training, therapeutic activities, therapeutic exercises, neuromuscular re-education  · Plan of Care Expires:  07/02/18   Plan of Care Reviewed with: patient, daughter    Subjective     Communicated with RN prior to  "session.  Patient found HOB elevated and daughter present upon PT entry to room, agreeable to evaluation.      Chief Complaint: no complaints made  Patient comments/goals: "Courtney." - when asked for pt's name  Pain/Comfort:  · Pain Rating 1: 0/10    Patients cultural, spiritual, Zoroastrian conflicts given the current situation: none    Living Environment:  Pt lives c son in [facility] c 5STE and ramp access.  PTA pt mainly bed bound for last 2 months prior to which pt was able to amb c [facility].  Prior to admission, patients level of function: requiring assistance c all functional mobility.  Patient has the following equipment: walker, rolling, wheelchair.  DME owned (not currently used): none.  Upon discharge, patient will have assistance from family.    Objective:     Patient found with: telemetry, peripheral IV     General Precautions: Standard, fall, aspiration   Orthopedic Precautions:N/A   Braces: N/A     Exams:  · Cognitive Exam:  Patient is oriented to Person and Place and follows 50%% of simple commands   · Gross Motor Coordination:  WFL  · Sensation:    · -       Intact  · RLE ROM: WFL  · RLE Strength: 2+/5   · LLE ROM: WFL  · LLE Strength: 2+/5    Functional Mobility:  · Bed Mobility:     · Rolling Right: maximal assistance  · Scooting: total assistance  · Supine to Sit: maximal assistance  · Sit to Supine: maximal assistance  · Transfers:     · Sit to Stand:  total assistance with no AD  · Attempted 2x  · Pt able to push through BUE to initiate transfer but unable to achieve full erect stand as B knee remain flexed d/t weakness.  · Balance: static sitting (SBA); dynamic sitting (min A -CGA)    AM-PAC 6 CLICK MOBILITY  Total Score:10       Therapeutic Activities and Exercises:  Educated on PT role/POC; benefits of OOB activities; d/c recs - v/u  Sat EOB x15 mins  Attempted to stand 2x c total A - pt unable to fully extend B knee d/t weakness  Repositioned in bed    Patient left HOB elevated with all lines intact, call " button in reach, RN notified and family present.    GOALS:    Physical Therapy Goals        Problem: Physical Therapy Goal    Goal Priority Disciplines Outcome Goal Variances Interventions   Physical Therapy Goal     PT/OT, PT Ongoing (interventions implemented as appropriate)     Description:  Goals to be met by: 2018     Patient will increase functional independence with mobility by performin. Supine to sit with MInimal Assistance  2. Sit to supine with MInimal Assistance  3. Sit to stand transfer with Moderate Assistance  4. Bed to chair transfer with Maximum Assistance  5. Sitting at edge of bed x20 minutes with Supervision  6. Lower extremity exercise program x30 reps per handout, with assistance as needed                      History:     Past Medical History:   Diagnosis Date    Anemia     Bone marrow disorder     Cataract     Encounter for blood transfusion     Hypertension     Sickle cell trait        Past Surgical History:   Procedure Laterality Date     SECTION      HYSTERECTOMY         Clinical Decision Making:     History  Co-morbidities and personal factors that may impact the plan of care Examination  Body Structures and Functions, activity limitations and participation restrictions that may impact the plan of care Clinical Presentation   Decision Making/ Complexity Score   Co-morbidities:   [x] Time since onset of injury / illness / exacerbation  [] Status of current condition  []Patient's cognitive status and safety concerns    [] Multiple Medical Problems (see med hx)  Personal Factors:   [] Patient's age  [x] Prior Level of function   [] Patient's home situation (environment and family support)  [] Patient's level of motivation  [] Expected progression of patient      HISTORY:(criteria)    [] 71442 - no personal factors/history    [x] 27317 - has 1-2 personal factor/comorbidity     [] 52873 - has >3 personal factor/comorbidity     Body Regions:  [] Objective examination  findings  [] Head     []  Neck  [x] Trunk   [x] Upper Extremity  [x] Lower Extremity    Body Systems:  [] For communication ability, affect, cognition, language, and learning style: the assessment of the ability to make needs known, consciousness, orientation (person, place, and time), expected emotional /behavioral responses, and learning preferences (eg, learning barriers, education  needs)  [] For the neuromuscular system: a general assessment of gross coordinated movement (eg, balance, gait, locomotion, transfers, and transitions) and motor function  (motor control and motor learning)  [x] For the musculoskeletal system: the assessment of gross symmetry, gross range of motion, gross strength, height, and weight  [] For the integumentary system: the assessment of pliability(texture), presence of scar formation, skin color, and skin integrity  [] For cardiovascular/pulmonary system: the assessment of heart rate, respiratory rate, blood pressure, and edema     Activity limitations:    [] Patient's cognitive status and saf ety concerns          [] Status of current condition      [] Weight bearing restriction  [] Cardiopulmunary Restriction    Participation Restrictions:   [] Goals and goal agreement with the patient     [] Rehab potential (prognosis) and probable outcome      Examination of Body System: (criteria)    [x] 89817 - addressing 1-2 elements    [] 20290 - addressing a total of 3 or more elements     [] 25504 -  Addressing a total of 4 or more elements         Clinical Presentation: (criteria)  Stable - 79431     On examination of body system using standardized tests and measures patient presents with 1-2 elements from any of the following: body structures and functions, activity limitations, and/or participation restrictions.  Leading to a clinical presentation that is considered stable and/or uncomplicated                              Clinical Decision Making  (Eval Complexity):  Low- 44061     Time  Tracking:     PT Received On: 06/02/18  PT Start Time: 0904     PT Stop Time: 0926  PT Total Time (min): 22 min     Billable Minutes: Evaluation 13 min and Therapeutic Activity 9 min      Isiah Wilson PT  06/02/2018

## 2018-06-03 PROBLEM — J96.01 ACUTE RESPIRATORY FAILURE WITH HYPOXIA: Status: ACTIVE | Noted: 2018-06-03

## 2018-06-03 PROBLEM — R06.82 TACHYPNEA: Status: ACTIVE | Noted: 2018-06-03

## 2018-06-03 PROBLEM — K92.1 BLOOD IN STOOL: Status: ACTIVE | Noted: 2018-06-03

## 2018-06-03 PROBLEM — Z71.89 GOALS OF CARE, COUNSELING/DISCUSSION: Status: ACTIVE | Noted: 2018-06-03

## 2018-06-03 LAB
ABO + RH BLD: NORMAL
ALBUMIN SERPL BCP-MCNC: 2 G/DL
ALLENS TEST: ABNORMAL
ALP SERPL-CCNC: 108 U/L
ALT SERPL W/O P-5'-P-CCNC: 22 U/L
ANION GAP SERPL CALC-SCNC: 11 MMOL/L
ANION GAP SERPL CALC-SCNC: 9 MMOL/L
ANISOCYTOSIS BLD QL SMEAR: SLIGHT
ANISOCYTOSIS BLD QL SMEAR: SLIGHT
AST SERPL-CCNC: 66 U/L
BASOPHILS # BLD AUTO: ABNORMAL K/UL
BASOPHILS # BLD AUTO: ABNORMAL K/UL
BASOPHILS NFR BLD: 0 %
BASOPHILS NFR BLD: 0 %
BILIRUB SERPL-MCNC: 0.9 MG/DL
BLASTS NFR BLD MANUAL: 5 %
BLD GP AB SCN CELLS X3 SERPL QL: NORMAL
BUN SERPL-MCNC: 21 MG/DL
BUN SERPL-MCNC: 26 MG/DL
BURR CELLS BLD QL SMEAR: ABNORMAL
BURR CELLS BLD QL SMEAR: ABNORMAL
CALCIUM SERPL-MCNC: 8.1 MG/DL
CALCIUM SERPL-MCNC: 8.2 MG/DL
CHLORIDE SERPL-SCNC: 111 MMOL/L
CHLORIDE SERPL-SCNC: 112 MMOL/L
CO2 SERPL-SCNC: 16 MMOL/L
CO2 SERPL-SCNC: 17 MMOL/L
CREAT SERPL-MCNC: 1.4 MG/DL
CREAT SERPL-MCNC: 1.5 MG/DL
DELSYS: ABNORMAL
DIFFERENTIAL METHOD: ABNORMAL
DIFFERENTIAL METHOD: ABNORMAL
EOSINOPHIL # BLD AUTO: ABNORMAL K/UL
EOSINOPHIL # BLD AUTO: ABNORMAL K/UL
EOSINOPHIL NFR BLD: 0 %
EOSINOPHIL NFR BLD: 0.5 %
ERYTHROCYTE [DISTWIDTH] IN BLOOD BY AUTOMATED COUNT: 17.8 %
ERYTHROCYTE [DISTWIDTH] IN BLOOD BY AUTOMATED COUNT: 17.8 %
ERYTHROCYTE [DISTWIDTH] IN BLOOD BY AUTOMATED COUNT: 18 %
EST. GFR  (AFRICAN AMERICAN): 38.5 ML/MIN/1.73 M^2
EST. GFR  (AFRICAN AMERICAN): 41.8 ML/MIN/1.73 M^2
EST. GFR  (NON AFRICAN AMERICAN): 33.4 ML/MIN/1.73 M^2
EST. GFR  (NON AFRICAN AMERICAN): 36.3 ML/MIN/1.73 M^2
FLOW: 3
GIANT PLATELETS BLD QL SMEAR: PRESENT
GLUCOSE SERPL-MCNC: 114 MG/DL
GLUCOSE SERPL-MCNC: 236 MG/DL
HCO3 UR-SCNC: 17.4 MMOL/L (ref 24–28)
HCT VFR BLD AUTO: 24.9 %
HCT VFR BLD AUTO: 26.2 %
HCT VFR BLD AUTO: 28.4 %
HGB BLD-MCNC: 8.1 G/DL
HGB BLD-MCNC: 8.4 G/DL
HGB BLD-MCNC: 9 G/DL
HYPOCHROMIA BLD QL SMEAR: ABNORMAL
HYPOCHROMIA BLD QL SMEAR: ABNORMAL
IMM GRANULOCYTES # BLD AUTO: ABNORMAL K/UL
IMM GRANULOCYTES # BLD AUTO: ABNORMAL K/UL
IMM GRANULOCYTES NFR BLD AUTO: ABNORMAL %
IMM GRANULOCYTES NFR BLD AUTO: ABNORMAL %
INR PPP: 1.3
LACTATE SERPL-SCNC: 2.6 MMOL/L
LYMPHOCYTES # BLD AUTO: ABNORMAL K/UL
LYMPHOCYTES # BLD AUTO: ABNORMAL K/UL
LYMPHOCYTES NFR BLD: 2.5 %
LYMPHOCYTES NFR BLD: 4 %
MAGNESIUM SERPL-MCNC: 1.8 MG/DL
MAGNESIUM SERPL-MCNC: 2.2 MG/DL
MCH RBC QN AUTO: 28.7 PG
MCH RBC QN AUTO: 28.8 PG
MCH RBC QN AUTO: 29.1 PG
MCHC RBC AUTO-ENTMCNC: 31.7 G/DL
MCHC RBC AUTO-ENTMCNC: 32.1 G/DL
MCHC RBC AUTO-ENTMCNC: 32.5 G/DL
MCV RBC AUTO: 89 FL
MCV RBC AUTO: 90 FL
MCV RBC AUTO: 91 FL
METAMYELOCYTES NFR BLD MANUAL: 3 %
METAMYELOCYTES NFR BLD MANUAL: 3 %
MODE: ABNORMAL
MONOCYTES # BLD AUTO: ABNORMAL K/UL
MONOCYTES # BLD AUTO: ABNORMAL K/UL
MONOCYTES NFR BLD: 0.5 %
MONOCYTES NFR BLD: 1 %
MYELOCYTES NFR BLD MANUAL: 12 %
MYELOCYTES NFR BLD MANUAL: 30 %
NEUTROPHILS NFR BLD: 53.5 %
NEUTROPHILS NFR BLD: 65 %
NEUTS BAND NFR BLD MANUAL: 8 %
NEUTS BAND NFR BLD MANUAL: 9 %
NRBC BLD-RTO: 2 /100 WBC
NRBC BLD-RTO: 3 /100 WBC
OVALOCYTES BLD QL SMEAR: ABNORMAL
PCO2 BLDA: 29.2 MMHG (ref 35–45)
PH SMN: 7.38 [PH] (ref 7.35–7.45)
PLATELET # BLD AUTO: 58 K/UL
PLATELET # BLD AUTO: 60 K/UL
PLATELET # BLD AUTO: 75 K/UL
PLATELET BLD QL SMEAR: ABNORMAL
PMV BLD AUTO: 13.3 FL
PMV BLD AUTO: ABNORMAL FL
PMV BLD AUTO: ABNORMAL FL
PO2 BLDA: 55 MMHG (ref 80–100)
POC BE: -8 MMOL/L
POC SATURATED O2: 88 % (ref 95–100)
POC TCO2: 18 MMOL/L (ref 23–27)
POIKILOCYTOSIS BLD QL SMEAR: SLIGHT
POIKILOCYTOSIS BLD QL SMEAR: SLIGHT
POLYCHROMASIA BLD QL SMEAR: ABNORMAL
POLYCHROMASIA BLD QL SMEAR: ABNORMAL
POTASSIUM SERPL-SCNC: 3.3 MMOL/L
POTASSIUM SERPL-SCNC: 4.1 MMOL/L
PROMYELOCYTES NFR BLD MANUAL: 0.5 %
PROMYELOCYTES NFR BLD MANUAL: 1 %
PROT SERPL-MCNC: 6.4 G/DL
PROTHROMBIN TIME: 13.2 SEC
RBC # BLD AUTO: 2.81 M/UL
RBC # BLD AUTO: 2.89 M/UL
RBC # BLD AUTO: 3.14 M/UL
SAMPLE: ABNORMAL
SITE: ABNORMAL
SODIUM SERPL-SCNC: 136 MMOL/L
SODIUM SERPL-SCNC: 140 MMOL/L
SPHEROCYTES BLD QL SMEAR: ABNORMAL
TROPONIN I SERPL DL<=0.01 NG/ML-MCNC: 0.02 NG/ML
URATE SERPL-MCNC: 5.3 MG/DL
WBC # BLD AUTO: 154.9 K/UL
WBC # BLD AUTO: 159.3 K/UL
WBC # BLD AUTO: 183.2 K/UL
WBC OTHER NFR BLD MANUAL: 2 %

## 2018-06-03 PROCEDURE — 93010 ELECTROCARDIOGRAM REPORT: CPT | Mod: ,,, | Performed by: INTERNAL MEDICINE

## 2018-06-03 PROCEDURE — 83735 ASSAY OF MAGNESIUM: CPT

## 2018-06-03 PROCEDURE — 25000003 PHARM REV CODE 250: Performed by: INTERNAL MEDICINE

## 2018-06-03 PROCEDURE — 83735 ASSAY OF MAGNESIUM: CPT | Mod: 91

## 2018-06-03 PROCEDURE — 25000242 PHARM REV CODE 250 ALT 637 W/ HCPCS: Performed by: HOSPITALIST

## 2018-06-03 PROCEDURE — 36415 COLL VENOUS BLD VENIPUNCTURE: CPT

## 2018-06-03 PROCEDURE — 80053 COMPREHEN METABOLIC PANEL: CPT

## 2018-06-03 PROCEDURE — 85027 COMPLETE CBC AUTOMATED: CPT | Mod: 91

## 2018-06-03 PROCEDURE — 84550 ASSAY OF BLOOD/URIC ACID: CPT

## 2018-06-03 PROCEDURE — 86580 TB INTRADERMAL TEST: CPT | Performed by: HOSPITALIST

## 2018-06-03 PROCEDURE — 86850 RBC ANTIBODY SCREEN: CPT

## 2018-06-03 PROCEDURE — 85027 COMPLETE CBC AUTOMATED: CPT

## 2018-06-03 PROCEDURE — 63600175 PHARM REV CODE 636 W HCPCS: Performed by: INTERNAL MEDICINE

## 2018-06-03 PROCEDURE — 84484 ASSAY OF TROPONIN QUANT: CPT

## 2018-06-03 PROCEDURE — 85610 PROTHROMBIN TIME: CPT

## 2018-06-03 PROCEDURE — 82803 BLOOD GASES ANY COMBINATION: CPT

## 2018-06-03 PROCEDURE — 87040 BLOOD CULTURE FOR BACTERIA: CPT

## 2018-06-03 PROCEDURE — 85007 BL SMEAR W/DIFF WBC COUNT: CPT

## 2018-06-03 PROCEDURE — 93005 ELECTROCARDIOGRAM TRACING: CPT

## 2018-06-03 PROCEDURE — 27000221 HC OXYGEN, UP TO 24 HOURS

## 2018-06-03 PROCEDURE — 63600175 PHARM REV CODE 636 W HCPCS: Performed by: HOSPITALIST

## 2018-06-03 PROCEDURE — 83605 ASSAY OF LACTIC ACID: CPT

## 2018-06-03 PROCEDURE — 25000003 PHARM REV CODE 250: Performed by: HOSPITALIST

## 2018-06-03 PROCEDURE — 36600 WITHDRAWAL OF ARTERIAL BLOOD: CPT

## 2018-06-03 PROCEDURE — 94761 N-INVAS EAR/PLS OXIMETRY MLT: CPT

## 2018-06-03 PROCEDURE — 99233 SBSQ HOSP IP/OBS HIGH 50: CPT | Mod: ,,, | Performed by: INTERNAL MEDICINE

## 2018-06-03 PROCEDURE — 99900035 HC TECH TIME PER 15 MIN (STAT)

## 2018-06-03 PROCEDURE — 80048 BASIC METABOLIC PNL TOTAL CA: CPT

## 2018-06-03 PROCEDURE — 20600001 HC STEP DOWN PRIVATE ROOM

## 2018-06-03 PROCEDURE — 94640 AIRWAY INHALATION TREATMENT: CPT

## 2018-06-03 RX ORDER — IPRATROPIUM BROMIDE AND ALBUTEROL SULFATE 2.5; .5 MG/3ML; MG/3ML
3 SOLUTION RESPIRATORY (INHALATION)
Status: DISCONTINUED | OUTPATIENT
Start: 2018-06-03 | End: 2018-06-03

## 2018-06-03 RX ORDER — MAGNESIUM SULFATE HEPTAHYDRATE 40 MG/ML
2 INJECTION, SOLUTION INTRAVENOUS ONCE
Status: COMPLETED | OUTPATIENT
Start: 2018-06-03 | End: 2018-06-03

## 2018-06-03 RX ORDER — POTASSIUM CHLORIDE 750 MG/1
20 CAPSULE, EXTENDED RELEASE ORAL ONCE
Status: DISCONTINUED | OUTPATIENT
Start: 2018-06-03 | End: 2018-06-03

## 2018-06-03 RX ORDER — CEFEPIME HYDROCHLORIDE 2 G/1
2 INJECTION, POWDER, FOR SOLUTION INTRAVENOUS
Status: DISCONTINUED | OUTPATIENT
Start: 2018-06-03 | End: 2018-06-04

## 2018-06-03 RX ORDER — IPRATROPIUM BROMIDE AND ALBUTEROL SULFATE 2.5; .5 MG/3ML; MG/3ML
3 SOLUTION RESPIRATORY (INHALATION) EVERY 4 HOURS PRN
Status: DISCONTINUED | OUTPATIENT
Start: 2018-06-03 | End: 2018-06-06 | Stop reason: HOSPADM

## 2018-06-03 RX ORDER — CEFEPIME HYDROCHLORIDE 2 G/1
2 INJECTION, POWDER, FOR SOLUTION INTRAVENOUS
Status: DISCONTINUED | OUTPATIENT
Start: 2018-06-03 | End: 2018-06-03

## 2018-06-03 RX ORDER — POTASSIUM CHLORIDE 20 MEQ/15ML
40 SOLUTION ORAL ONCE
Status: COMPLETED | OUTPATIENT
Start: 2018-06-03 | End: 2018-06-03

## 2018-06-03 RX ORDER — HYDROXYUREA 500 MG/1
500 CAPSULE ORAL 2 TIMES DAILY
Status: DISCONTINUED | OUTPATIENT
Start: 2018-06-03 | End: 2018-06-05

## 2018-06-03 RX ADMIN — HYDROXYUREA 500 MG: 500 CAPSULE ORAL at 09:06

## 2018-06-03 RX ADMIN — IPRATROPIUM BROMIDE AND ALBUTEROL SULFATE 3 ML: .5; 3 SOLUTION RESPIRATORY (INHALATION) at 01:06

## 2018-06-03 RX ADMIN — MOXIFLOXACIN HYDROCHLORIDE 400 MG: 400 TABLET, FILM COATED ORAL at 08:06

## 2018-06-03 RX ADMIN — ALLOPURINOL 100 MG: 100 TABLET ORAL at 08:06

## 2018-06-03 RX ADMIN — POTASSIUM CHLORIDE 40 MEQ: 20 SOLUTION ORAL at 10:06

## 2018-06-03 RX ADMIN — MAGNESIUM SULFATE IN WATER 2 G: 40 INJECTION, SOLUTION INTRAVENOUS at 10:06

## 2018-06-03 RX ADMIN — CEFEPIME HYDROCHLORIDE 2 G: 2 INJECTION, POWDER, FOR SOLUTION INTRAVENOUS at 09:06

## 2018-06-03 RX ADMIN — GUAIFENESIN 200 MG: 200 SOLUTION ORAL at 05:06

## 2018-06-03 RX ADMIN — NEOMYCIN AND POLYMYXIN B SULFATES AND BACITRACIN ZINC: 400; 3.5; 5 OINTMENT TOPICAL at 09:06

## 2018-06-03 RX ADMIN — GUAIFENESIN 200 MG: 200 SOLUTION ORAL at 11:06

## 2018-06-03 RX ADMIN — SODIUM CHLORIDE, SODIUM LACTATE, POTASSIUM CHLORIDE, AND CALCIUM CHLORIDE 250 ML: .6; .31; .03; .02 INJECTION, SOLUTION INTRAVENOUS at 08:06

## 2018-06-03 RX ADMIN — GUAIFENESIN 200 MG: 200 SOLUTION ORAL at 12:06

## 2018-06-03 RX ADMIN — NEOMYCIN AND POLYMYXIN B SULFATES AND BACITRACIN ZINC: 400; 3.5; 5 OINTMENT TOPICAL at 03:06

## 2018-06-03 RX ADMIN — IPRATROPIUM BROMIDE AND ALBUTEROL SULFATE 3 ML: .5; 3 SOLUTION RESPIRATORY (INHALATION) at 10:06

## 2018-06-03 RX ADMIN — DEXTROSE MONOHYDRATE, SODIUM CHLORIDE, AND POTASSIUM CHLORIDE: 50; 4.5; 1.49 INJECTION, SOLUTION INTRAVENOUS at 10:06

## 2018-06-03 RX ADMIN — IPRATROPIUM BROMIDE AND ALBUTEROL SULFATE 3 ML: .5; 3 SOLUTION RESPIRATORY (INHALATION) at 07:06

## 2018-06-03 RX ADMIN — Medication 5 UNITS: at 08:06

## 2018-06-03 RX ADMIN — IPRATROPIUM BROMIDE AND ALBUTEROL SULFATE 3 ML: .5; 3 SOLUTION RESPIRATORY (INHALATION) at 05:06

## 2018-06-03 RX ADMIN — HYDROXYUREA 500 MG: 500 CAPSULE ORAL at 10:06

## 2018-06-03 NOTE — PLAN OF CARE
Problem: Patient Care Overview  Goal: Plan of Care Review  Outcome: Ongoing (interventions implemented as appropriate)  Pt on bedrest, non-verbal, but responds appropriately to requests. Daughter at bedside, involved in care. Afebrile throughout shift. VSS with no acute events. No complaints of pain. Tolerating diet, no complaints of N/V. Urinary output increased this shift, two bloody, loose stools noted. Incontinence care and sacral dressing change performed throughout shift as needed. Neosporin applied to nasal excoriation as ordered. No acute events so far this shift. Pt remaining free from falls or injury. Bed in lowest locked position, side rails up x2, call light w/i reach, pt instructed to call for assistance if needed. Skid proof socks on. Care plan explained to patient. No additional complaints at this time. Q 2hr rounding performed. Will continue to monitor.

## 2018-06-03 NOTE — SUBJECTIVE & OBJECTIVE
Subjective:     Interval History: Goals of care discussions yesterday per separate chart notes. One episode of maroon, dark bloody stool without clots or melena per nursing overnight. Per daughter congestion and cough improved during day but still bad at night.     Unable to perform ROS 2/2 nonverbal    Objective:     Vital Signs (Most Recent):  Temp: 98.5 °F (36.9 °C) (06/03/18 0705)  Pulse: 110 (06/03/18 0742)  Resp: 18 (06/03/18 0705)  BP: (!) 135/90 (06/03/18 0705)  SpO2: 98 % (06/03/18 0315) Vital Signs (24h Range):  Temp:  [98.4 °F (36.9 °C)-99 °F (37.2 °C)] 98.5 °F (36.9 °C)  Pulse:  [102-129] 110  Resp:  [18-20] 18  SpO2:  [96 %-99 %] 98 %  BP: (117-135)/(56-90) 135/90     Weight: 39.9 kg (87 lb 15.4 oz)  Body mass index is 17.77 kg/m².  Body surface area is 1.29 meters squared.    ECOG SCORE         [unfilled]    Intake/Output - Last 3 Shifts       06/01 0700 - 06/02 0659 06/02 0700 - 06/03 0659 06/03 0700 - 06/04 0659    P.O. 120 120     I.V. (mL/kg)  2648.8 (66.4)     Total Intake(mL/kg) 120 (3) 2768.8 (69.4)     Urine (mL/kg/hr)  50 (0.1)     Total Output   50      Net +120 +2718.8             Urine Occurrence 2 x 7 x     Stool Occurrence  2 x           Physical Exam   Constitutional: She appears cachectic. She is uncooperative. She appears ill.   HENT:   Head: Normocephalic and atraumatic.   Eyes: Conjunctivae are normal. Pupils are equal, round, and reactive to light.   Neck: No tracheal deviation present.   Cardiovascular: Regular rhythm.  Tachycardia present.    Pulmonary/Chest: Effort normal. No stridor. No respiratory distress. She has no wheezes. She has no rales.   Abdominal: Soft. She exhibits mass (splenomegaly). She exhibits no distension. There is no tenderness.   Musculoskeletal: She exhibits no edema or tenderness.   Neurological: She is alert.   Alert, follows simple 1 step commands most of time, no focal deficits noted   Skin: Skin is warm and dry.       Significant Labs:   CBC:      Recent Labs  Lab 06/02/18  0434 06/02/18  2135 06/03/18  0503   .10* 134.70* 154.90*   HGB 6.9* 8.2* 9.0*   HCT 20.9* 24.8* 28.4*   PLT 49* 54* 58*    and CMP:     Recent Labs  Lab 06/02/18  0434   *   K 3.3*      CO2 20*      BUN 32*   CREATININE 1.7*   CALCIUM 8.1*   PROT 6.2   ALBUMIN 2.0*   BILITOT 1.2*   ALKPHOS 92   AST 64*   ALT 22   ANIONGAP 8   EGFRNONAA 28.7*       Diagnostic Results:  CxR: Interstitial infiltrates RLL

## 2018-06-03 NOTE — ASSESSMENT & PLAN NOTE
Oncologic History per HPI  -Hold jakafi, restart hydrea 1g BID  -Transfuse for Hgb < 7, plt < 50k given possible active bleed  -No evidence of transformation on last marrow, but high risk for AML transformation

## 2018-06-03 NOTE — PROGRESS NOTES
[facility]  Hematology  Bone Marrow Transplant  Progress Note    Patient Name: Courtney Hammer  Admission Date: 5/31/2018  Hospital Length of Stay: 3 days  Code Status: Full Code    Subjective:     Interval History: Goals of care discussions yesterday per separate chart notes. One episode of maroon, dark bloody stool without clots or melena per nursing overnight. Per daughter congestion and cough improved during day but still bad at night.     Unable to perform ROS 2/2 nonverbal    Objective:     Vital Signs (Most Recent):  Temp: 98.5 °F (36.9 °C) (06/03/18 0705)  Pulse: 110 (06/03/18 0742)  Resp: 18 (06/03/18 0705)  BP: (!) 135/90 (06/03/18 0705)  SpO2: 98 % (06/03/18 0315) Vital Signs (24h Range):  Temp:  [98.4 °F (36.9 °C)-99 °F (37.2 °C)] 98.5 °F (36.9 °C)  Pulse:  [102-129] 110  Resp:  [18-20] 18  SpO2:  [96 %-99 %] 98 %  BP: (117-135)/(56-90) 135/90     Weight: 39.9 kg (87 lb 15.4 oz)  Body mass index is 17.77 kg/m².  Body surface area is 1.29 meters squared.    ECOG SCORE         [unfilled]    Intake/Output - Last 3 Shifts       06/01 0700 - 06/02 0659 06/02 0700 - 06/03 0659 06/03 0700 - 06/04 0659    P.O. 120 120     I.V. (mL/kg)  2648.8 (66.4)     Total Intake(mL/kg) 120 (3) 2768.8 (69.4)     Urine (mL/kg/hr)  50 (0.1)     Total Output   50      Net +120 +2718.8             Urine Occurrence 2 x 7 x     Stool Occurrence  2 x           Physical Exam   Constitutional: She appears cachectic. She is uncooperative. She appears ill.   HENT:   Head: Normocephalic and atraumatic.   Eyes: Conjunctivae are normal. Pupils are equal, round, and reactive to light.   Neck: No tracheal deviation present.   Cardiovascular: Regular rhythm.  Tachycardia present.    Pulmonary/Chest: Effort normal. No stridor. No respiratory distress. She has no wheezes. She has no rales.   Abdominal: Soft. She exhibits mass (splenomegaly). She exhibits no distension. There is no tenderness.   Musculoskeletal: She exhibits no  edema or tenderness.   Neurological: She is alert.   Alert, follows simple 1 step commands most of time, no focal deficits noted   Skin: Skin is warm and dry.       Significant Labs:   CBC:     Recent Labs  Lab 06/02/18  0434 06/02/18  2135 06/03/18  0503   .10* 134.70* 154.90*   HGB 6.9* 8.2* 9.0*   HCT 20.9* 24.8* 28.4*   PLT 49* 54* 58*    and CMP:     Recent Labs  Lab 06/02/18  0434   *   K 3.3*      CO2 20*      BUN 32*   CREATININE 1.7*   CALCIUM 8.1*   PROT 6.2   ALBUMIN 2.0*   BILITOT 1.2*   ALKPHOS 92   AST 64*   ALT 22   ANIONGAP 8   EGFRNONAA 28.7*       Diagnostic Results:  CxR: Interstitial infiltrates RLL    Assessment/Plan:     * Sepsis with acute organ dysfunction     Presents with mental status change, FELICE and fevers; improving  -Sources at present include RLL PNA vs UTI vs less likely CNS (no meningeal signs)  -Organ dysfunction of mental status change and FELICE  -Admit 4/4 SIRS, 2/3 qSOFA, LA 2.7, PCT 5  -s/p 30cc/kg bous  -Evaluated by MICU who recommends floor admission, appreciate help   -De-escalated to moxifloxacin po, plans for 7 day total course, s/p Vanc, Cefepime  -nebs q4h wake, mucinex, tessalon prn       Encephalopathy, metabolic     Likely 2/2 sepsis; MRI brain negative, improving, per family near or at baseline       Acute renal failure superimposed on stage 3 chronic kidney disease     Likely 2/2 sepsis, s/p 30cc/kg, improving        Pneumonia of right lower lobe due to infectious organism     -See sepsis        Myeloproliferative disorder     Oncologic History per HPI  -Hold jakafi, hydrea  -Transfuse for Hgb < 7, plt < 50k given possible active bleed  -No evidence of transformation on last marrow, but high risk for AML transformation        Physical debility     PT/OT rec SNF  PPD placed, family prefers location near [place]        Anemia     See myeloproliferative d/o         Blood in stool     Suspect lower given history, but smaller volume and  hemoglobin increasing. Will monitor closely        VTE Risk Mitigation         Ordered     IP VTE HIGH RISK PATIENT  Once      05/31/18 4882        Disposition: pending    Watson Luong IV, MD  Bone Marrow Transplant  [facility]

## 2018-06-04 PROBLEM — R23.9 ALTERATION IN SKIN INTEGRITY: Status: ACTIVE | Noted: 2018-06-04

## 2018-06-04 PROBLEM — E87.20 NORMAL ANION GAP METABOLIC ACIDOSIS: Status: ACTIVE | Noted: 2018-06-04

## 2018-06-04 PROBLEM — R15.9 DERMATITIS ASSOCIATED WITH MOISTURE FROM STOOL INCONTINENCE: Status: ACTIVE | Noted: 2018-06-04

## 2018-06-04 PROBLEM — L25.8 DERMATITIS ASSOCIATED WITH MOISTURE FROM STOOL INCONTINENCE: Status: ACTIVE | Noted: 2018-06-04

## 2018-06-04 PROBLEM — K59.01 SLOW TRANSIT CONSTIPATION: Status: ACTIVE | Noted: 2018-06-04

## 2018-06-04 LAB
ALBUMIN SERPL BCP-MCNC: 1.9 G/DL
ALBUMIN SERPL BCP-MCNC: 2.2 G/DL
ALP SERPL-CCNC: 112 U/L
ALP SERPL-CCNC: 128 U/L
ALT SERPL W/O P-5'-P-CCNC: 24 U/L
ALT SERPL W/O P-5'-P-CCNC: 25 U/L
ANION GAP SERPL CALC-SCNC: 10 MMOL/L
ANION GAP SERPL CALC-SCNC: 8 MMOL/L
ANISOCYTOSIS BLD QL SMEAR: SLIGHT
AST SERPL-CCNC: 60 U/L
AST SERPL-CCNC: 74 U/L
BASOPHILS NFR BLD: 0 %
BILIRUB SERPL-MCNC: 0.8 MG/DL
BILIRUB SERPL-MCNC: 0.9 MG/DL
BLASTS NFR BLD MANUAL: 7 %
BUN SERPL-MCNC: 20 MG/DL
BUN SERPL-MCNC: 20 MG/DL
BURR CELLS BLD QL SMEAR: ABNORMAL
CALCIUM SERPL-MCNC: 8.1 MG/DL
CALCIUM SERPL-MCNC: 8.8 MG/DL
CHLORIDE SERPL-SCNC: 111 MMOL/L
CHLORIDE SERPL-SCNC: 115 MMOL/L
CO2 SERPL-SCNC: 19 MMOL/L
CO2 SERPL-SCNC: 20 MMOL/L
CREAT SERPL-MCNC: 1.3 MG/DL
CREAT SERPL-MCNC: 1.4 MG/DL
DIFFERENTIAL METHOD: ABNORMAL
EOSINOPHIL NFR BLD: 1 %
ERYTHROCYTE [DISTWIDTH] IN BLOOD BY AUTOMATED COUNT: 18.3 %
EST. GFR  (AFRICAN AMERICAN): 41.8 ML/MIN/1.73 M^2
EST. GFR  (AFRICAN AMERICAN): 45.7 ML/MIN/1.73 M^2
EST. GFR  (NON AFRICAN AMERICAN): 36.3 ML/MIN/1.73 M^2
EST. GFR  (NON AFRICAN AMERICAN): 39.7 ML/MIN/1.73 M^2
GLUCOSE SERPL-MCNC: 122 MG/DL
GLUCOSE SERPL-MCNC: 96 MG/DL
HCT VFR BLD AUTO: 25.6 %
HGB BLD-MCNC: 8.5 G/DL
HYPOCHROMIA BLD QL SMEAR: ABNORMAL
IMM GRANULOCYTES # BLD AUTO: ABNORMAL K/UL
IMM GRANULOCYTES NFR BLD AUTO: ABNORMAL %
INR PPP: 1.3
LACTATE SERPL-SCNC: 1.2 MMOL/L
LYMPHOCYTES NFR BLD: 3 %
MAGNESIUM SERPL-MCNC: 1.7 MG/DL
MAGNESIUM SERPL-MCNC: 1.9 MG/DL
MCH RBC QN AUTO: 29.5 PG
MCHC RBC AUTO-ENTMCNC: 33.2 G/DL
MCV RBC AUTO: 89 FL
METAMYELOCYTES NFR BLD MANUAL: 19 %
MONOCYTES NFR BLD: 4 %
MYELOCYTES NFR BLD MANUAL: 4 %
NEUTROPHILS NFR BLD: 57 %
NEUTS BAND NFR BLD MANUAL: 4 %
NRBC BLD-RTO: 2 /100 WBC
PHOSPHATE SERPL-MCNC: 1.7 MG/DL
PLATELET # BLD AUTO: 65 K/UL
PLATELET BLD QL SMEAR: ABNORMAL
PMV BLD AUTO: ABNORMAL FL
POIKILOCYTOSIS BLD QL SMEAR: SLIGHT
POLYCHROMASIA BLD QL SMEAR: ABNORMAL
POTASSIUM SERPL-SCNC: 3.4 MMOL/L
POTASSIUM SERPL-SCNC: 3.9 MMOL/L
PROCALCITONIN SERPL IA-MCNC: 0.96 NG/ML
PROMYELOCYTES NFR BLD MANUAL: 1 %
PROT SERPL-MCNC: 6.2 G/DL
PROT SERPL-MCNC: 7 G/DL
PROTHROMBIN TIME: 13.2 SEC
RBC # BLD AUTO: 2.88 M/UL
SODIUM SERPL-SCNC: 141 MMOL/L
SODIUM SERPL-SCNC: 142 MMOL/L
URATE SERPL-MCNC: 4.8 MG/DL
VANCOMYCIN SERPL-MCNC: 15.2 UG/ML
WBC # BLD AUTO: 190.8 K/UL

## 2018-06-04 PROCEDURE — 25000003 PHARM REV CODE 250: Performed by: INTERNAL MEDICINE

## 2018-06-04 PROCEDURE — 83735 ASSAY OF MAGNESIUM: CPT | Mod: 91

## 2018-06-04 PROCEDURE — 85007 BL SMEAR W/DIFF WBC COUNT: CPT

## 2018-06-04 PROCEDURE — 93010 ELECTROCARDIOGRAM REPORT: CPT | Mod: ,,, | Performed by: INTERNAL MEDICINE

## 2018-06-04 PROCEDURE — 83605 ASSAY OF LACTIC ACID: CPT

## 2018-06-04 PROCEDURE — 63600175 PHARM REV CODE 636 W HCPCS: Performed by: INTERNAL MEDICINE

## 2018-06-04 PROCEDURE — 20600001 HC STEP DOWN PRIVATE ROOM

## 2018-06-04 PROCEDURE — 84550 ASSAY OF BLOOD/URIC ACID: CPT

## 2018-06-04 PROCEDURE — 80053 COMPREHEN METABOLIC PANEL: CPT

## 2018-06-04 PROCEDURE — 93005 ELECTROCARDIOGRAM TRACING: CPT

## 2018-06-04 PROCEDURE — 80202 ASSAY OF VANCOMYCIN: CPT

## 2018-06-04 PROCEDURE — 80053 COMPREHEN METABOLIC PANEL: CPT | Mod: 91

## 2018-06-04 PROCEDURE — 84100 ASSAY OF PHOSPHORUS: CPT

## 2018-06-04 PROCEDURE — 25000003 PHARM REV CODE 250: Performed by: HOSPITALIST

## 2018-06-04 PROCEDURE — 85610 PROTHROMBIN TIME: CPT

## 2018-06-04 PROCEDURE — 36415 COLL VENOUS BLD VENIPUNCTURE: CPT

## 2018-06-04 PROCEDURE — 84145 PROCALCITONIN (PCT): CPT

## 2018-06-04 PROCEDURE — 99233 SBSQ HOSP IP/OBS HIGH 50: CPT | Mod: ,,, | Performed by: INTERNAL MEDICINE

## 2018-06-04 PROCEDURE — 85027 COMPLETE CBC AUTOMATED: CPT

## 2018-06-04 PROCEDURE — 97535 SELF CARE MNGMENT TRAINING: CPT

## 2018-06-04 PROCEDURE — 97110 THERAPEUTIC EXERCISES: CPT

## 2018-06-04 PROCEDURE — 83735 ASSAY OF MAGNESIUM: CPT

## 2018-06-04 RX ORDER — LANOLIN ALCOHOL/MO/W.PET/CERES
400 CREAM (GRAM) TOPICAL EVERY 4 HOURS PRN
Status: DISCONTINUED | OUTPATIENT
Start: 2018-06-04 | End: 2018-06-06 | Stop reason: HOSPADM

## 2018-06-04 RX ORDER — LANOLIN ALCOHOL/MO/W.PET/CERES
800 CREAM (GRAM) TOPICAL EVERY 4 HOURS PRN
Status: DISCONTINUED | OUTPATIENT
Start: 2018-06-04 | End: 2018-06-06 | Stop reason: HOSPADM

## 2018-06-04 RX ORDER — BISACODYL 10 MG
10 SUPPOSITORY, RECTAL RECTAL ONCE
Status: DISCONTINUED | OUTPATIENT
Start: 2018-06-04 | End: 2018-06-06

## 2018-06-04 RX ORDER — SODIUM CHLORIDE, SODIUM LACTATE, POTASSIUM CHLORIDE, CALCIUM CHLORIDE 600; 310; 30; 20 MG/100ML; MG/100ML; MG/100ML; MG/100ML
INJECTION, SOLUTION INTRAVENOUS CONTINUOUS
Status: DISCONTINUED | OUTPATIENT
Start: 2018-06-04 | End: 2018-06-06

## 2018-06-04 RX ORDER — CEFEPIME HYDROCHLORIDE 2 G/1
2 INJECTION, POWDER, FOR SOLUTION INTRAVENOUS
Status: DISCONTINUED | OUTPATIENT
Start: 2018-06-05 | End: 2018-06-06 | Stop reason: HOSPADM

## 2018-06-04 RX ORDER — RAMELTEON 8 MG/1
8 TABLET ORAL NIGHTLY PRN
Status: DISCONTINUED | OUTPATIENT
Start: 2018-06-04 | End: 2018-06-06 | Stop reason: HOSPADM

## 2018-06-04 RX ORDER — SODIUM,POTASSIUM PHOSPHATES 280-250MG
2 POWDER IN PACKET (EA) ORAL EVERY 4 HOURS PRN
Status: DISCONTINUED | OUTPATIENT
Start: 2018-06-04 | End: 2018-06-06 | Stop reason: HOSPADM

## 2018-06-04 RX ORDER — POTASSIUM CHLORIDE 750 MG/1
20 CAPSULE, EXTENDED RELEASE ORAL
Status: DISCONTINUED | OUTPATIENT
Start: 2018-06-04 | End: 2018-06-06 | Stop reason: HOSPADM

## 2018-06-04 RX ORDER — POLYETHYLENE GLYCOL 3350 17 G/17G
17 POWDER, FOR SOLUTION ORAL DAILY
Status: DISCONTINUED | OUTPATIENT
Start: 2018-06-04 | End: 2018-06-06 | Stop reason: HOSPADM

## 2018-06-04 RX ORDER — SIMETHICONE 80 MG
1 TABLET,CHEWABLE ORAL 3 TIMES DAILY PRN
Status: DISCONTINUED | OUTPATIENT
Start: 2018-06-04 | End: 2018-06-06 | Stop reason: HOSPADM

## 2018-06-04 RX ORDER — SODIUM,POTASSIUM PHOSPHATES 280-250MG
1 POWDER IN PACKET (EA) ORAL EVERY 4 HOURS PRN
Status: DISCONTINUED | OUTPATIENT
Start: 2018-06-04 | End: 2018-06-06 | Stop reason: HOSPADM

## 2018-06-04 RX ADMIN — GUAIFENESIN 200 MG: 200 SOLUTION ORAL at 05:06

## 2018-06-04 RX ADMIN — HYDROXYUREA 500 MG: 500 CAPSULE ORAL at 09:06

## 2018-06-04 RX ADMIN — CEFEPIME HYDROCHLORIDE 2 G: 2 INJECTION, POWDER, FOR SOLUTION INTRAVENOUS at 05:06

## 2018-06-04 RX ADMIN — RAMELTEON 8 MG: 8 TABLET, FILM COATED ORAL at 02:06

## 2018-06-04 RX ADMIN — DEXTROSE MONOHYDRATE, SODIUM CHLORIDE, AND POTASSIUM CHLORIDE: 50; 4.5; 1.49 INJECTION, SOLUTION INTRAVENOUS at 05:06

## 2018-06-04 RX ADMIN — POTASSIUM BICARBONATE 50 MEQ: 25 TABLET, EFFERVESCENT ORAL at 05:06

## 2018-06-04 RX ADMIN — NEOMYCIN AND POLYMYXIN B SULFATES AND BACITRACIN ZINC: 400; 3.5; 5 OINTMENT TOPICAL at 08:06

## 2018-06-04 RX ADMIN — HYDROXYUREA 500 MG: 500 CAPSULE ORAL at 08:06

## 2018-06-04 RX ADMIN — VANCOMYCIN HYDROCHLORIDE 500 MG: 500 INJECTION, POWDER, LYOPHILIZED, FOR SOLUTION INTRAVENOUS at 05:06

## 2018-06-04 RX ADMIN — GUAIFENESIN 200 MG: 200 SOLUTION ORAL at 11:06

## 2018-06-04 RX ADMIN — ALLOPURINOL 100 MG: 100 TABLET ORAL at 09:06

## 2018-06-04 RX ADMIN — SODIUM CHLORIDE, SODIUM LACTATE, POTASSIUM CHLORIDE, AND CALCIUM CHLORIDE 250 ML: .6; .31; .03; .02 INJECTION, SOLUTION INTRAVENOUS at 02:06

## 2018-06-04 RX ADMIN — SODIUM CHLORIDE, SODIUM LACTATE, POTASSIUM CHLORIDE, AND CALCIUM CHLORIDE: .6; .31; .03; .02 INJECTION, SOLUTION INTRAVENOUS at 06:06

## 2018-06-04 RX ADMIN — NEOMYCIN AND POLYMYXIN B SULFATES AND BACITRACIN ZINC: 400; 3.5; 5 OINTMENT TOPICAL at 03:06

## 2018-06-04 RX ADMIN — NEOMYCIN AND POLYMYXIN B SULFATES AND BACITRACIN ZINC: 400; 3.5; 5 OINTMENT TOPICAL at 09:06

## 2018-06-04 RX ADMIN — MOXIFLOXACIN HYDROCHLORIDE 400 MG: 400 TABLET, FILM COATED ORAL at 09:06

## 2018-06-04 NOTE — PT/OT/SLP PROGRESS
Occupational Therapy   Treatment    Name: Courtney Hammer  MRN: [id number]  Admitting Diagnosis:  Sepsis with acute organ dysfunction       Recommendations:     Discharge Recommendations:  (at this point, family would like to take pt home with home hospice or other care)  Discharge Equipment Recommendations:  hospital bed  Barriers to discharge:  None    Subjective     Communicated with: nsg prior to session.  Pain/Comfort:  · Pain Rating 1: 0/10    Patients cultural, spiritual, Adventism conflicts given the current situation: none    Objective:     Patient found with: telemetry, PureWick, peripheral IV, oxygen    General Precautions: Standard, fall, aspiration   Orthopedic Precautions:    Braces:       Occupational Performance:    Bed Mobility:    · Sup to sit with HOB up and max assist- pt attempting to assist with cues  · Sup to sit with total assist  · Scooting up in bed total assist     Functional Mobility/Transfers:  · Scooting laterally total assist along EOB  · Sit to stand x 1 total assist  · EOB x ~20 min with UE support of 1 during exercises/dynamic tasks    Activities of Daily Living:  · Per dtr was able to assist with feeding yesterday  · Simple grooming min assist    Patient left supine with all lines intact and dtr and PCA present    AMPA 6 Click:  AMPA Total Score: 9    Treatment & Education:  Pt performed above activity, performed BLE AAROM exercises x 3 sets x 10 reps for ankle pumps, marches, quad sets with min to mod cues, BUE AROM exercises with min cues for shoulder flexion and elbow flexion/extension. Educated dtr on: mobility techniques for sup to sit and use of drawsheet or blue pad to assist, BUE and BLE exercises for ROM and strength,  dtr to encourage any assist from pt during ADL's and mobility, POC, recommendations, position changes in bed.  Education:    Assessment:     Courtney Hammer is a 77 y.o. female with a medical diagnosis of Sepsis with acute organ dysfunction.  She presents with  improved cognition, following ~75% simple commands for exercises and simple adl task with min to mod cues.  Performance deficits affecting function are weakness, impaired self care skills, impaired functional mobilty, impaired balance, impaired cognition, decreased lower extremity function, impaired cardiopulmonary response to activity, decreased ROM.      Rehab Prognosis:  fair; patient would benefit from acute skilled OT services to address these deficits and reach maximum level of function.       Plan:     Patient to be seen 3 x/week to address the above listed problems via self-care/home management, therapeutic activities, therapeutic exercises  · Plan of Care Expires: 07/01/18  · Plan of Care Reviewed with: patient, daughter    This Plan of care has been discussed with the patient who was involved in its development and understands and is in agreement with the identified goals and treatment plan    GOALS:    Occupational Therapy Goals        Problem: Occupational Therapy Goal    Goal Priority Disciplines Outcome Interventions   Occupational Therapy Goal     OT, PT/OT Ongoing (interventions implemented as appropriate)    Description:  Goals to be met by 6/15/18    Likely more focus on family education/recs pending further medical w/u and prognosis      UE Dressing with Moderate Assistance.  Grooming while EOB with Moderate Assistance.  Sitting at edge of bed x15 min minutes with Stand-by Assistance.  Pt will participate in BUE/BLE exercises to prevent decreased ROM/strenght for increased ability to assist with ADL's and for decreased caregiver burden  Family will be educated on ROM, mobility techniques and positioning, DME rec's, ADL education as appropriate                    Time Tracking:     OT Date of Treatment: 06/04/18  OT Start Time: 1229  OT Stop Time: 1257  OT Total Time (min): 28 min    Billable Minutes:Self Care/Home Management 10 min  Therapeutic Exercise 18 min    Chiara Rosas OT  6/4/2018

## 2018-06-04 NOTE — ASSESSMENT & PLAN NOTE
- KUB with abdominal distension and fecal impaction  - will try dulcolax suppository and schedule miralax daily

## 2018-06-04 NOTE — SUBJECTIVE & OBJECTIVE
Interval History:   Increased tachypnea and tachycardia overnight prompting evaluation by ICU. Patient was made DNR by ICU and started on cefepime. Lactic acid was found to be 2.6. Discussed with daughter at bedside and would like for us to focus on comfort care and quality of life. Reports that her mom's nickname was Courtney the Explorer because she was previously so active. Feels that how she's been living (bed bound) is incongruent to how she would want to live. Was able to broach this subject with the patient herself as she was more lucid. She reports to me that she would like to focus on comfort measures and go home.  planning on visiting this afternoon to discuss GOC.     Oncology Treatment Plan:   [No treatment plan]    Medications:  Continuous Infusions:   dextrose 5 % and 0.45 % NaCl with KCl 20 mEq 75 mL/hr at 06/04/18 0516     Scheduled Meds:   allopurinol  100 mg Per NG tube Daily    bisacodyl  10 mg Rectal Once    ceFEPime (MAXIPIME) IVPB  2 g Intravenous Q8H    guaifenesin 100 mg/5 ml  200 mg Oral Q6H    hydroxyurea  500 mg Oral BID    moxifloxacin  400 mg Oral Daily    neomycin-bacitracin-polymyxin   Topical (Top) TID    polyethylene glycol  17 g Oral Daily     PRN Meds:acetaminophen, albuterol-ipratropium, benzonatate, promethazine, ramelteon, simethicone, sodium chloride 0.9%     Review of Systems   Constitutional: Negative for chills and fever.   Eyes: Negative for visual disturbance.   Respiratory: Negative for shortness of breath.    Cardiovascular: Negative for chest pain.   Gastrointestinal: Positive for abdominal distention and constipation. Negative for abdominal pain.   Genitourinary: Negative for difficulty urinating.   Musculoskeletal: Positive for gait problem.   Skin: Negative for pallor.   Neurological: Negative for headaches.   Psychiatric/Behavioral: Positive for confusion and decreased concentration.     Objective:     Vital Signs (Most Recent):  Temp: 97.8 °F (36.6 °C)  (06/04/18 0752)  Pulse: 99 (06/04/18 0752)  Resp: 18 (06/04/18 0752)  BP: 135/61 (06/04/18 0500)  SpO2: 98 % (06/04/18 0752) Vital Signs (24h Range):  Temp:  [97.8 °F (36.6 °C)-99.5 °F (37.5 °C)] 97.8 °F (36.6 °C)  Pulse:  [] 99  Resp:  [16-22] 18  SpO2:  [94 %-100 %] 98 %  BP: (110-135)/(55-68) 135/61     Weight: 39.9 kg (87 lb 15.4 oz)  Body mass index is 17.77 kg/m².  Body surface area is 1.29 meters squared.      Intake/Output Summary (Last 24 hours) at 06/04/18 0813  Last data filed at 06/04/18 0000   Gross per 24 hour   Intake              560 ml   Output                0 ml   Net              560 ml       Physical Exam   Constitutional: She appears cachectic. She is uncooperative. She appears ill.   HENT:   Head: Normocephalic and atraumatic.   Eyes: Conjunctivae are normal. Pupils are equal, round, and reactive to light.   Neck: No tracheal deviation present.   Cardiovascular: Regular rhythm.  Tachycardia present.    Pulmonary/Chest: Effort normal. No stridor. Tachypnea noted. No respiratory distress. She has no wheezes. She has no rales.   Abdominal: Soft. She exhibits distension and mass (splenomegaly). There is no tenderness. There is no guarding.   Hypoactive bowel sounds   Musculoskeletal: She exhibits no edema or tenderness.   Neurological: She is alert.   Alert, follows simple 1 step commands most of time, no focal deficits noted. Able to answer yes or no and speak in simple sentences.    Skin: Skin is warm and dry.       Significant Labs:   All pertinent labs from the last 24 hours have been reviewed.    Diagnostic Results:  I have reviewed and interpreted all pertinent imaging results/findings within the past 24 hours.

## 2018-06-04 NOTE — PROGRESS NOTES
Consulted to see for skin breakdown to buttocks  Family member reports the injury occurred during placement of a bedpan and describes shearing of the tissues .Has purple discoloration which signifies a deep tissue injury . Loose purple skin will eventually slough off to reveal the wound base. Will place on SPR+ pump to convert bed to low air loss.   Aquacel foam applied to upper buttocks skin injury.   Will add barrier cream to perirectal area as she is having fecal incontinence .       06/04/18 1700       Wound 06/02/18 1438 Skin Tear upper Buttocks   Date First Assessed/Time First Assessed: 06/02/18 1438   Pre-existing: No  Primary Wound Type: Skin Tear  Orientation: upper  Location: Buttocks   Wound Image    Wound WDL ex   Dressing Appearance Open to air;No dressing   Drainage Amount None   Appearance Purple;Blistered;Moist   Periwound Area Intact   Wound Edges Open   Wound Length (cm) 3 cm   Wound Width (cm) 4 cm   Wound Depth (cm) 0.1 cm   Wound Volume (cm^3) 1.2 cm^3   Care Cleansed with:;Sterile normal saline   Dressing Applied;Hydrofiber;Foam     Bebe Covarrubias RN CWON  [id number]

## 2018-06-04 NOTE — PLAN OF CARE
Problem: Patient Care Overview  Goal: Plan of Care Review  Outcome: Ongoing (interventions implemented as appropriate)  Pt on bedrest, mostly non-verbal, but responds appropriately to requests. Daughter at bedside, involved in care. Pt with low grade temp throughout shift. Tachycardic and tachypneic, abdomen distended and taut, sepsis workup ordered. LR 250mL bolus x2 administered. Cefepime administered as ordered. No overt complaints of pain, but pt appears restless. Tolerating diet, no complaints of N/V. Pt incontinent of urine and feces with frequent changes performed throughout shift. Several maroon colored stools this shift. Incontinence care and sacral dressing change performed as needed. Neosporin applied to nasal excoriation as ordered. Pt remaining free from falls or injury. Bed in lowest locked position, side rails up x2, call light w/i reach, pt instructed to call for assistance if needed. Skid proof socks on. Care plan explained to patient. No additional complaints at this time. Q 2hr rounding performed. Will continue to monitor.

## 2018-06-04 NOTE — PROGRESS NOTES
[facility]  Hematology  Bone Marrow Transplant  Progress Note    Patient Name: Courtney Hammer  Admission Date: 5/31/2018  Hospital Length of Stay: 4 days  Code Status: DNR    Subjective:     Interval History:   Increased tachypnea and tachycardia overnight prompting evaluation by ICU. Patient was made DNR by ICU and started on cefepime. Lactic acid was found to be 2.6. Discussed with daughter at bedside and would like for us to focus on comfort care and quality of life. Reports that her mom's nickname was Courtney the Explorer because she was previously so active. Feels that how she's been living (bed bound) is incongruent to how she would want to live. Was able to broach this subject with the patient herself as she was more lucid. She reports to me that she would like to focus on comfort measures and go home.  planning on visiting this afternoon to discuss GOC.       Complains of some abdominal distension and some pain associated with this. Constipation + She denies any fevers, chills, cough, SOB, CP, N/V, diarrhea or dysuria.    Objective:     Vital Signs (Most Recent):  Temp: 97.8 °F (36.6 °C) (06/04/18 0752)  Pulse: 99 (06/04/18 0752)  Resp: 18 (06/04/18 0752)  BP: 135/61 (06/04/18 0500)  SpO2: 98 % (06/04/18 0752) Vital Signs (24h Range):  Temp:  [97.8 °F (36.6 °C)-99.5 °F (37.5 °C)] 97.8 °F (36.6 °C)  Pulse:  [] 99  Resp:  [16-22] 18  SpO2:  [94 %-100 %] 98 %  BP: (110-135)/(55-68) 135/61     Weight: 39.9 kg (87 lb 15.4 oz)  Body mass index is 17.77 kg/m².  Body surface area is 1.29 meters squared.    ECOG SCORE         [unfilled]    Intake/Output - Last 3 Shifts       06/02 0700 - 06/03 0659 06/03 0700 - 06/04 0659 06/04 0700 - 06/05 0659    P.O. 120 560     I.V. (mL/kg) 2648.8 (66.4)      Total Intake(mL/kg) 2768.8 (69.4) 560 (14)     Urine (mL/kg/hr) 50 (0.1)      Total Output 50        Net +2718.8 +560             Urine Occurrence 7 x 6 x     Stool Occurrence 2 x 4 x            Physical Exam   Constitutional: She appears cachectic. She is uncooperative. She appears ill.   HENT:   Head: Normocephalic and atraumatic.   Eyes: Conjunctivae are normal. Pupils are equal, round, and reactive to light.   Neck: No tracheal deviation present.   Cardiovascular: Regular rhythm.  Tachycardia present.    Pulmonary/Chest: Effort normal. No stridor. Tachypnea noted. No respiratory distress. She has no wheezes. She has no rales.   Abdominal: Soft. She exhibits distension and mass (splenomegaly). There is no tenderness. There is no guarding.   Hypoactive bowel sounds   Musculoskeletal: She exhibits no edema or tenderness.   Neurological: She is alert.   Alert, follows simple 1 step commands most of time, no focal deficits noted. Able to answer yes or no and speak in simple sentences.    Skin: Skin is warm and dry.       Significant Labs:   All pertinent labs from the last 24 hours have been reviewed.    Diagnostic Results:  I have reviewed and interpreted all pertinent imaging results/findings within the past 24 hours.    Assessment/Plan:     * Sepsis with acute organ dysfunction    Presents with mental status change, FELICE and fevers; improving  -Sources at present include RLL PNA vs UTI vs less likely CNS (no meningeal signs)  -Organ dysfunction of mental status change and FELICE  -4/4 SIRS, 2/3 qSOFA, LA 2.7 trended down to 0.6 but up to 2.6 on 6/3, PCT 5  -s/p 30cc/kg bous  -NGTD on all cultures   -Will discuss GOC this afternoon with  but for now will continue vanc, cefepime and moxi   -Evaluated by MICU who recommends floor admission, appreciate help        Slow transit constipation    - KUB with abdominal distension and fecal impaction  - will try dulcolax suppository and schedule miralax daily         Normal anion gap metabolic acidosis    - possibly secondary to increased LA 2/2 sepsis vs progression of disease  - does have concurrent hyperchloremia but not having diarrhea   - no apparent  offending medications   - will continue to trend        Tachypnea    - likely compensatory for his metabolic acidosis         Acute respiratory failure with hypoxia    -Likely 2/2 PNA  -Wean oxygen as tolerated to maintain O2 sats>92  -on broad spectrum abx          Encephalopathy, metabolic    -Resolved and back to baseline as per daughter  -Likely 2/2 sepsis but high risk for clots 2/2 disease  -MRI without any acute abnormality         Acute renal failure superimposed on stage 3 chronic kidney disease    Likely 2/2 sepsis, s/p 30cc/kg, improving         Pneumonia of right lower lobe due to infectious organism    -See sepsis        Myeloproliferative disorder    Oncologic History per HPI  -Hold jakafi, restart hydrea 1g BID  -Transfuse for Hgb < 7, plt < 50k given possible active bleed  -No evidence of transformation on last marrow, but high risk for AML transformation        Thrombocytopenia    - no signs of overt bleeding  - transfuse for platelets <10        Physical debility    - PT/OT ordered  - recommending SNF        Anemia    See myeloproliferative d/o            VTE Risk Mitigation         Ordered     IP VTE HIGH RISK PATIENT  Once      05/31/18 0320          Disposition: Possibly to home hospice vs inpatient hospice    Teodora Malin MD  Bone Marrow Transplant  [facility]

## 2018-06-04 NOTE — PROGRESS NOTES
Admit Assessment    Patient Identification  Courtney Hammer   :  1940  Admit Date:  2018  Attending Provider:  Eddi Fernández MD              Referral:   Pt was admitted to  with a diagnosis of Sepsis with acute organ dysfunction, and was admitted this hospital stay due to Anemia due to antineoplastic chemotherapy [D64.81, T45.1X5A]  Sepsis [A41.9]  Pneumonia of right lower lobe due to infectious organism [J18.1].   is involved was referred to the Social Work Department via routine referral.  Patient presents as a 77 y.o. year old  female.    Persons interviewed: patient's daughter (Maricruz Hammer-[phone])    Living Situation: pt. Currently resides at home with her , 2 daughters and son in law. Per pts. Daughter, pt has been requiring a lot of care at home and has been needing full time help from family.       Resides at [address] [place] [address] [id number] [place] [address], phone: [phone] (home).           Current or Past Agencies and Description of Services/Supplies    DME  Agency Name: none  Agency Phone Number: n/a  Equipment Currently Used at Home: walker, rolling, wheelchair    Home Health  Agency Name: none  Agency Phone Number: n/a  Services: n/a    IV Infusion  Agency Name: none  Agency Phone Number: n/a    Nutrition: oral    Outpatient Pharmacy:     [facility] [address] [place] [address] [address] [facility] [address] [facility] [address]   [address] [facility] [address]  Phone: [phone] Fax: [phone]    [facility]  [address] [facility] [address]  Phone: [phone] Fax: [phone]    [facility]  [address] [facility] [address]  Phone: [phone] Fax: [phone]      Patient Preference of agencies include: none noted    Patient/Caregiver informed of right to choose providers or agencies.  Patient provides permission to release any necessary information to [facility] and  to Non[facility] agencies as needed to facilitate patient care, treatment planning, and patient discharge planning.  Written and verbal resources provided.      Coping: unable to assess, pt. Confused (all info obtained from pts. Family)          Adjustment to Diagnosis and Treatment: (Unable to assess)      Emotional/Behavioral/Cognitive Issues: none noted            History/Current Symptoms of Anxiety/Depression: No:   History/Current Substance Use:   Social History     Social History Main Topics    Smoking status: Never Smoker    Smokeless tobacco: Never Used    Alcohol use No    Drug use: No    Sexual activity: Not on file       Indications of Abuse/Neglect: No:   Abuse Screen  Do You Feel Unsafe at Home, Work or School?: no  Has Anyone Ever Threatened to Hurt You, Your Children or Your Pets?: no  Does Anyone Try to Keep You From Having Contact With Others or Doing Things Outside Your Home?: no    Financial:  Payor/Plan Subscr  Sex Relation Sub. Ins. ID Effective Group Num   1. [place]* SLIM MARION 1940 Female  [id number]                                    [address]   2. [facility]* SLIM MARION 1940 Female  [id number]                                   [address] [address]                            Other identified concerns/needs: May need hospice upon dc    Plan: to return home with family who will assist as needed.     Interventions/Referrals: TBD  Patient/caregiver engaged in treatment planning process.     providing psychosocial and supportive counseling, resources, education, assistance and discharge planning as appropriate.  Patient/caregiver state understanding of  available resources,  following, remains available. Provided pts. Daughter with 'er phone # and encouraged her to call if needed. Will follow.

## 2018-06-04 NOTE — ASSESSMENT & PLAN NOTE
-Resolved and back to baseline as per daughter  -Likely 2/2 sepsis but high risk for clots 2/2 disease  -MRI without any acute abnormality

## 2018-06-04 NOTE — CONSULTS
Please see consult note dated 6/3/18. Patient also previously consulted to [facility] on 5/31/18.     Vi Ayala NP  Critical Care Medicine

## 2018-06-04 NOTE — PLAN OF CARE
Problem: Occupational Therapy Goal  Goal: Occupational Therapy Goal  Goals to be met by 6/15/18    Likely more focus on family education/recs pending further medical w/u and prognosis      UE Dressing with Moderate Assistance.  Grooming while EOB with Moderate Assistance.  Sitting at edge of bed x15 min minutes with Stand-by Assistance.  Pt will participate in BUE/BLE exercises to prevent decreased ROM/strenght for increased ability to assist with ADL's and for decreased caregiver burden  Family will be educated on ROM, mobility techniques and positioning, DME rec's, ADL education as appropriate   Outcome: Ongoing (interventions implemented as appropriate)  con't with goals  Chiara Rosas, OTR

## 2018-06-04 NOTE — CONSULTS
"[facility]  Critical Care Medicine  Consult Note    Patient Name: Courtney Hammer  MRN: [id number]  Admission Date: 5/31/2018  Hospital Length of Stay: 3 days  Code Status: DNR  Attending Physician: Eddi Fernández MD   Primary Care Provider: Florencio Pacheco MD   Principal Problem: Sepsis with acute organ dysfunction    Consults  Subjective:     HPI:  78 y/o PMH HTN, sickle cell trait, JAK2 [id number] positive myeloproliferative disorder who presents from home with chief complaint of unresponsiveness.  History is limited and provided by chart and family.  Family reports patient has been getting progressively worst of the past 2 weeks.  They report the patient's PO intake significantly declined, she developed loose stools x3 days, cough x 2 days and intermittent fevers.  Family became concerned when the patient stopped talking 5 days ago.  Family was instructed to bring her to the ED 2 days ago where she received "only fluid" and was discharged home.  Patient continued to decline and this morning was difficult to arouse.  In the ED she was started on abx, fluid resuscitated and given 1 unit of blood.  MRI showed no acute processes.  Per family patient is much more awake following fluid.   They denied nausea/vomiting, or visible discomfort.  Of note patient was recently started on clonazepam by her family doctor.  At baseline patient follows simple commands, will request simple needs, and is chronically confused.  She has been bed bound for the past couple of months.       Recently transitioned from hydroxyurea to jakofi.  Oncological history detailed in BMT note.     Interval update.  Patient admitted to floor with improvement in mental status with abx.  Primary team engaging in active goals of care conversations and plans for hospice.  Consulted for tachypnea and tachycardiac.  On exam patient is much more awake from previous exam however is still near non verbal and does not respond appropriately.  " Daughter is at bedside and reports worsening abdominal distension and work of breathing.  Last BM was yesterday.  She reports her mother's mental status appears to wax and wane throughout the day, however is significantly improved from admission.  Regarding earlier goals of care conversions, the family has decided to make the patient DNR but still is undecided on hospice.  Per chart review afebrile with increasing WBC, family reports no signs of distress.              Past Medical History:   Diagnosis Date    Anemia     Bone marrow disorder     Cataract     Encounter for blood transfusion     Hypertension     Sickle cell trait        Past Surgical History:   Procedure Laterality Date     SECTION      HYSTERECTOMY         Review of patient's allergies indicates:  No Known Allergies    Family History     Problem Relation (Age of Onset)    Prostate cancer Brother    Sickle cell anemia Father    Thyroid disease Mother        Social History Main Topics    Smoking status: Never Smoker    Smokeless tobacco: Never Used    Alcohol use No    Drug use: No    Sexual activity: Not on file      Review of Systems   Unable to perform ROS: Mental status change     Objective:     Vital Signs (Most Recent):  Temp: 99.2 °F (37.3 °C) (18)  Pulse: (!) 126 (18)  Resp: (!) 22 (18)  BP: 129/60 (18)  SpO2: 100 % (18) Vital Signs (24h Range):  Temp:  [98.4 °F (36.9 °C)-99.2 °F (37.3 °C)] 99.2 °F (37.3 °C)  Pulse:  [102-155] 126  Resp:  [16-22] 22  SpO2:  [94 %-100 %] 100 %  BP: (110-135)/(55-90) 129/60   Weight: 39.9 kg (87 lb 15.4 oz)  Body mass index is 17.77 kg/m².      Intake/Output Summary (Last 24 hours) at 18 2320  Last data filed at 18 1300   Gross per 24 hour   Intake              320 ml   Output                0 ml   Net              320 ml       Physical Exam   Constitutional: She appears cachectic. She is uncooperative. She appears ill.   HENT:    Head: Normocephalic and atraumatic.   Eyes: Conjunctivae are normal. Pupils are equal, round, and reactive to light.   Neck: No tracheal deviation present.   Cardiovascular: Regular rhythm.  Tachycardia present.    Pulmonary/Chest: Effort normal. No stridor. Tachypnea noted. No respiratory distress. She has no wheezes. She has no rales.   Abdominal: Soft. She exhibits distension and mass (splenomegaly). There is no tenderness. There is no guarding.   Musculoskeletal: She exhibits no edema or tenderness.   Neurological: She is alert.   Alert, follows simple 1 step commands most of time, no focal deficits noted   Skin: Skin is warm and dry.       Vents:     Lines/Drains/Airways     Peripheral Intravenous Line                 Peripheral IV - Single Lumen 05/31/18 0825 Right Forearm 3 days         Peripheral IV - Single Lumen 05/31/18 0832 Right Wrist 3 days              Significant Labs:    CBC/Anemia Profile:    Recent Labs  Lab 06/03/18  0503 06/03/18  1555 06/03/18 2010   .90* 159.30* 183.20*   HGB 9.0* 8.1* 8.4*   HCT 28.4* 24.9* 26.2*   PLT 58* 60* 75*   MCV 90 89 91   RDW 17.8* 17.8* 18.0*        Chemistries:    Recent Labs  Lab 06/02/18  0434 06/03/18  0503 06/03/18 2010   * 140 136   K 3.3* 3.3* 4.1    112* 111*   CO2 20* 17* 16*   BUN 32* 26* 21   CREATININE 1.7* 1.4 1.5*   CALCIUM 8.1* 8.2* 8.1*   ALBUMIN 2.0* 2.0*  --    PROT 6.2 6.4  --    BILITOT 1.2* 0.9  --    ALKPHOS 92 108  --    ALT 22 22  --    AST 64* 66*  --    MG 2.2 1.8 2.2       Lactic Acid:   Recent Labs  Lab 06/03/18 2010   LACTATE 2.6*     All pertinent labs within the past 24 hours have been reviewed.    Significant Imaging: I have reviewed all pertinent imaging results/findings within the past 24 hours.    Assessment/Plan:     Pulmonary   Tachypnea    Likely multifactorial, made worst by significant abdominal dissension and weakness, some concern for leukostasis.   - CXR shows poor inspiration with no new focal  consolidations   - Consider enema and aggressive bowel regimen given abdominal distension and gaseous distension to help improve breathing dynamics.     - Discussed goals of care, patient is DNR  - Discussed leukapheresis with Heme/Onc fellow, will defer decision for leukapheresis to Heme/onc, if needed please re contact MICU             Cardiac/Vascular   Tachycardia    Suspect secondary to increased work of breathing and pain with abdominal distension  - EEG reviewed, sinus with PVCs  - Consider trial of 500ml bolus and reassess, would infuse slowly and monitor respiratory status.        Renal/   Acute renal failure superimposed on stage 3 chronic kidney disease    Likely pre-renal related to poor PO intake, diarrhea and sepsis  - Hold fluids for now and monitor response  - Phos and Uric acid to check for TLS   - Further workup if no improvement         ID   * Sepsis with acute organ dysfunction    Continue current abx regimen   - Given elevated lactic acid consider slow 500ml bolus          Oncology   Leukocytosis    Per Primary  - If Heme/Onc request leukapheresis please re contact MICU.      Other   Goals of care, counseling/discussion    - Goals per primary  - Family has decided on DNR, form signed and updated chart.             Thank you for your consult. I will sign off. Please contact us if you have any additional questions.     Eddi Rodriguez MD  Critical Care Medicine  [facility]

## 2018-06-04 NOTE — SUBJECTIVE & OBJECTIVE
Past Medical History:   Diagnosis Date    Anemia     Bone marrow disorder     Cataract     Encounter for blood transfusion     Hypertension     Sickle cell trait        Past Surgical History:   Procedure Laterality Date     SECTION      HYSTERECTOMY         Review of patient's allergies indicates:  No Known Allergies    Family History     Problem Relation (Age of Onset)    Prostate cancer Brother    Sickle cell anemia Father    Thyroid disease Mother        Social History Main Topics    Smoking status: Never Smoker    Smokeless tobacco: Never Used    Alcohol use No    Drug use: No    Sexual activity: Not on file      Review of Systems   Unable to perform ROS: Mental status change     Objective:     Vital Signs (Most Recent):  Temp: 99.2 °F (37.3 °C) (18)  Pulse: (!) 126 (18)  Resp: (!) 22 (18)  BP: 129/60 (18)  SpO2: 100 % (18) Vital Signs (24h Range):  Temp:  [98.4 °F (36.9 °C)-99.2 °F (37.3 °C)] 99.2 °F (37.3 °C)  Pulse:  [102-155] 126  Resp:  [16-22] 22  SpO2:  [94 %-100 %] 100 %  BP: (110-135)/(55-90) 129/60   Weight: 39.9 kg (87 lb 15.4 oz)  Body mass index is 17.77 kg/m².      Intake/Output Summary (Last 24 hours) at 180  Last data filed at 18 1300   Gross per 24 hour   Intake              320 ml   Output                0 ml   Net              320 ml       Physical Exam   Constitutional: She appears cachectic. She is uncooperative. She appears ill.   HENT:   Head: Normocephalic and atraumatic.   Eyes: Conjunctivae are normal. Pupils are equal, round, and reactive to light.   Neck: No tracheal deviation present.   Cardiovascular: Regular rhythm.  Tachycardia present.    Pulmonary/Chest: Effort normal. No stridor. Tachypnea noted. No respiratory distress. She has no wheezes. She has no rales.   Abdominal: Soft. She exhibits distension and mass (splenomegaly). There is no tenderness. There is no guarding.    Musculoskeletal: She exhibits no edema or tenderness.   Neurological: She is alert.   Alert, follows simple 1 step commands most of time, no focal deficits noted   Skin: Skin is warm and dry.       Vents:     Lines/Drains/Airways     Peripheral Intravenous Line                 Peripheral IV - Single Lumen 05/31/18 0825 Right Forearm 3 days         Peripheral IV - Single Lumen 05/31/18 0832 Right Wrist 3 days              Significant Labs:    CBC/Anemia Profile:    Recent Labs  Lab 06/03/18  0503 06/03/18  1555 06/03/18 2010   .90* 159.30* 183.20*   HGB 9.0* 8.1* 8.4*   HCT 28.4* 24.9* 26.2*   PLT 58* 60* 75*   MCV 90 89 91   RDW 17.8* 17.8* 18.0*        Chemistries:    Recent Labs  Lab 06/02/18  0434 06/03/18  0503 06/03/18 2010   * 140 136   K 3.3* 3.3* 4.1    112* 111*   CO2 20* 17* 16*   BUN 32* 26* 21   CREATININE 1.7* 1.4 1.5*   CALCIUM 8.1* 8.2* 8.1*   ALBUMIN 2.0* 2.0*  --    PROT 6.2 6.4  --    BILITOT 1.2* 0.9  --    ALKPHOS 92 108  --    ALT 22 22  --    AST 64* 66*  --    MG 2.2 1.8 2.2       Lactic Acid:   Recent Labs  Lab 06/03/18 2010   LACTATE 2.6*     All pertinent labs within the past 24 hours have been reviewed.    Significant Imaging: I have reviewed all pertinent imaging results/findings within the past 24 hours.

## 2018-06-04 NOTE — ASSESSMENT & PLAN NOTE
Suspect secondary to increased work of breathing and pain with abdominal distension  - EEG reviewed, sinus with PVCs  - Consider trial of 500ml bolus and reassess, would infuse slowly and monitor respiratory status.

## 2018-06-04 NOTE — NURSING
Patient with congestion lungs. New orders noted. Labs posted. ABGs ordered. Resp treatment ordered.

## 2018-06-04 NOTE — ASSESSMENT & PLAN NOTE
Likely multifactorial, made worst by significant abdominal dissension and weakness, some concern for leukostasis.   - CXR shows poor inspiration with no new focal consolidations   - Consider enema and aggressive bowel regimen given abdominal distension and gaseous distension to help improve breathing dynamics.     - Discussed goals of care, patient is DNR  - Discussed leukapheresis with Heme/Onc fellow, will defer decision for leukapheresis to Heme/onc, if needed please re contact MICU

## 2018-06-04 NOTE — ASSESSMENT & PLAN NOTE
- possibly secondary to increased LA 2/2 sepsis vs progression of disease  - does have concurrent hyperchloremia but not having diarrhea   - no apparent offending medications   - will continue to trend

## 2018-06-04 NOTE — SUBJECTIVE & OBJECTIVE
Subjective:     Interval History:   Increased tachypnea and tachycardia overnight prompting evaluation by ICU. Patient was made DNR by ICU and started on cefepime. Lactic acid was found to be 2.6. Discussed with daughter at bedside and would like for us to focus on comfort care and quality of life. Reports that her mom's nickname was Courtney the Explorer because she was previously so active. Feels that how she's been living (bed bound) is incongruent to how she would want to live. Was able to broach this subject with the patient herself as she was more lucid. She reports to me that she would like to focus on comfort measures and go home.  planning on visiting this afternoon to discuss GOC.       Complains of some abdominal distension and some pain associated with this. Constipation + She denies any fevers, chills, cough, SOB, CP, N/V, diarrhea or dysuria.    Objective:     Vital Signs (Most Recent):  Temp: 97.8 °F (36.6 °C) (06/04/18 0752)  Pulse: 99 (06/04/18 0752)  Resp: 18 (06/04/18 0752)  BP: 135/61 (06/04/18 0500)  SpO2: 98 % (06/04/18 0752) Vital Signs (24h Range):  Temp:  [97.8 °F (36.6 °C)-99.5 °F (37.5 °C)] 97.8 °F (36.6 °C)  Pulse:  [] 99  Resp:  [16-22] 18  SpO2:  [94 %-100 %] 98 %  BP: (110-135)/(55-68) 135/61     Weight: 39.9 kg (87 lb 15.4 oz)  Body mass index is 17.77 kg/m².  Body surface area is 1.29 meters squared.    ECOG SCORE         [unfilled]    Intake/Output - Last 3 Shifts       06/02 0700 - 06/03 0659 06/03 0700 - 06/04 0659 06/04 0700 - 06/05 0659    P.O. 120 560     I.V. (mL/kg) 2648.8 (66.4)      Total Intake(mL/kg) 2768.8 (69.4) 560 (14)     Urine (mL/kg/hr) 50 (0.1)      Total Output 50        Net +2718.8 +560             Urine Occurrence 7 x 6 x     Stool Occurrence 2 x 4 x           Physical Exam   Constitutional: She appears cachectic. She is uncooperative. She appears ill.   HENT:   Head: Normocephalic and atraumatic.   Eyes: Conjunctivae are normal. Pupils are equal,  round, and reactive to light.   Neck: No tracheal deviation present.   Cardiovascular: Regular rhythm.  Tachycardia present.    Pulmonary/Chest: Effort normal. No stridor. Tachypnea noted. No respiratory distress. She has no wheezes. She has no rales.   Abdominal: Soft. She exhibits distension and mass (splenomegaly). There is no tenderness. There is no guarding.   Hypoactive bowel sounds   Musculoskeletal: She exhibits no edema or tenderness.   Neurological: She is alert.   Alert, follows simple 1 step commands most of time, no focal deficits noted. Able to answer yes or no and speak in simple sentences.    Skin: Skin is warm and dry.       Significant Labs:   All pertinent labs from the last 24 hours have been reviewed.    Diagnostic Results:  I have reviewed and interpreted all pertinent imaging results/findings within the past 24 hours.

## 2018-06-05 LAB
ALBUMIN SERPL BCP-MCNC: 1.9 G/DL
ALP SERPL-CCNC: 110 U/L
ALT SERPL W/O P-5'-P-CCNC: 25 U/L
ANION GAP SERPL CALC-SCNC: 8 MMOL/L
ANISOCYTOSIS BLD QL SMEAR: SLIGHT
AST SERPL-CCNC: 79 U/L
BACTERIA BLD CULT: NORMAL
BACTERIA BLD CULT: NORMAL
BASO STIPL BLD QL SMEAR: ABNORMAL
BASOPHILS NFR BLD: 0 %
BILIRUB SERPL-MCNC: 0.7 MG/DL
BUN SERPL-MCNC: 20 MG/DL
CALCIUM SERPL-MCNC: 8.1 MG/DL
CHLORIDE SERPL-SCNC: 105 MMOL/L
CO2 SERPL-SCNC: 19 MMOL/L
CREAT SERPL-MCNC: 1.3 MG/DL
DACRYOCYTES BLD QL SMEAR: ABNORMAL
DIFFERENTIAL METHOD: ABNORMAL
EOSINOPHIL NFR BLD: 0 %
ERYTHROCYTE [DISTWIDTH] IN BLOOD BY AUTOMATED COUNT: 18.6 %
EST. GFR  (AFRICAN AMERICAN): 45.7 ML/MIN/1.73 M^2
EST. GFR  (NON AFRICAN AMERICAN): 39.7 ML/MIN/1.73 M^2
GLUCOSE SERPL-MCNC: 76 MG/DL
HCT VFR BLD AUTO: 24.8 %
HGB BLD-MCNC: 8.1 G/DL
IMM GRANULOCYTES # BLD AUTO: ABNORMAL K/UL
IMM GRANULOCYTES NFR BLD AUTO: ABNORMAL %
INR PPP: 1.3
LYMPHOCYTES NFR BLD: 2 %
MAGNESIUM SERPL-MCNC: 1.8 MG/DL
MCH RBC QN AUTO: 29.5 PG
MCHC RBC AUTO-ENTMCNC: 32.7 G/DL
MCV RBC AUTO: 90 FL
METAMYELOCYTES NFR BLD MANUAL: 2 %
MONOCYTES NFR BLD: 1 %
MYELOCYTES NFR BLD MANUAL: 36 %
NEUTROPHILS NFR BLD: 58 %
NEUTS BAND NFR BLD MANUAL: 1 %
NRBC BLD-RTO: 2 /100 WBC
OVALOCYTES BLD QL SMEAR: ABNORMAL
PHOSPHATE SERPL-MCNC: 2 MG/DL
PLATELET # BLD AUTO: 62 K/UL
PLATELET BLD QL SMEAR: ABNORMAL
PMV BLD AUTO: ABNORMAL FL
POIKILOCYTOSIS BLD QL SMEAR: SLIGHT
POLYCHROMASIA BLD QL SMEAR: ABNORMAL
POTASSIUM SERPL-SCNC: 4.2 MMOL/L
PROT SERPL-MCNC: 6.1 G/DL
PROTHROMBIN TIME: 13.4 SEC
RBC # BLD AUTO: 2.75 M/UL
SODIUM SERPL-SCNC: 132 MMOL/L
TB INDURATION 48 - 72 HR READ: 0 MM
URATE SERPL-MCNC: 4.5 MG/DL
VANCOMYCIN SERPL-MCNC: 19.5 UG/ML
WBC # BLD AUTO: 208.3 K/UL

## 2018-06-05 PROCEDURE — 25000003 PHARM REV CODE 250: Performed by: INTERNAL MEDICINE

## 2018-06-05 PROCEDURE — 83735 ASSAY OF MAGNESIUM: CPT

## 2018-06-05 PROCEDURE — G8998 SWALLOW D/C STATUS: HCPCS | Mod: CI

## 2018-06-05 PROCEDURE — 84550 ASSAY OF BLOOD/URIC ACID: CPT

## 2018-06-05 PROCEDURE — 99233 SBSQ HOSP IP/OBS HIGH 50: CPT | Mod: ,,, | Performed by: INTERNAL MEDICINE

## 2018-06-05 PROCEDURE — 92526 ORAL FUNCTION THERAPY: CPT

## 2018-06-05 PROCEDURE — 25000003 PHARM REV CODE 250: Performed by: HOSPITALIST

## 2018-06-05 PROCEDURE — 84100 ASSAY OF PHOSPHORUS: CPT

## 2018-06-05 PROCEDURE — 85610 PROTHROMBIN TIME: CPT

## 2018-06-05 PROCEDURE — 80053 COMPREHEN METABOLIC PANEL: CPT

## 2018-06-05 PROCEDURE — 80202 ASSAY OF VANCOMYCIN: CPT

## 2018-06-05 PROCEDURE — 63600175 PHARM REV CODE 636 W HCPCS: Performed by: INTERNAL MEDICINE

## 2018-06-05 PROCEDURE — 20600001 HC STEP DOWN PRIVATE ROOM

## 2018-06-05 PROCEDURE — 85007 BL SMEAR W/DIFF WBC COUNT: CPT

## 2018-06-05 PROCEDURE — 85027 COMPLETE CBC AUTOMATED: CPT

## 2018-06-05 RX ORDER — GUAIFENESIN 100 MG/5ML
200 SOLUTION ORAL 4 TIMES DAILY PRN
Start: 2018-06-05 | End: 2018-06-05 | Stop reason: HOSPADM

## 2018-06-05 RX ORDER — LEVOFLOXACIN 750 MG/1
750 TABLET ORAL EVERY OTHER DAY
Start: 2018-06-05

## 2018-06-05 RX ORDER — BENZONATATE 100 MG/1
100 CAPSULE ORAL 3 TIMES DAILY PRN
Start: 2018-06-05

## 2018-06-05 RX ORDER — HYDROXYUREA 500 MG/1
1000 CAPSULE ORAL 2 TIMES DAILY
Start: 2018-06-05

## 2018-06-05 RX ORDER — GUAIFENESIN/DEXTROMETHORPHAN 100-10MG/5
10 SYRUP ORAL EVERY 4 HOURS PRN
Start: 2018-06-05

## 2018-06-05 RX ORDER — IPRATROPIUM BROMIDE AND ALBUTEROL SULFATE 2.5; .5 MG/3ML; MG/3ML
3 SOLUTION RESPIRATORY (INHALATION) EVERY 4 HOURS PRN
Start: 2018-06-05 | End: 2019-06-05

## 2018-06-05 RX ORDER — PROMETHAZINE HYDROCHLORIDE 6.25 MG/5ML
12.5 SYRUP ORAL EVERY 6 HOURS PRN
Start: 2018-06-05

## 2018-06-05 RX ORDER — POLYETHYLENE GLYCOL 3350 17 G/17G
17 POWDER, FOR SOLUTION ORAL DAILY
Start: 2018-06-06

## 2018-06-05 RX ORDER — HYDROXYUREA 500 MG/1
1000 CAPSULE ORAL 2 TIMES DAILY
Status: DISCONTINUED | OUTPATIENT
Start: 2018-06-05 | End: 2018-06-06 | Stop reason: HOSPADM

## 2018-06-05 RX ORDER — SIMETHICONE 80 MG
80 TABLET,CHEWABLE ORAL 3 TIMES DAILY PRN
Start: 2018-06-05

## 2018-06-05 RX ADMIN — SODIUM CHLORIDE, SODIUM LACTATE, POTASSIUM CHLORIDE, AND CALCIUM CHLORIDE: .6; .31; .03; .02 INJECTION, SOLUTION INTRAVENOUS at 08:06

## 2018-06-05 RX ADMIN — HYDROXYUREA 500 MG: 500 CAPSULE ORAL at 08:06

## 2018-06-05 RX ADMIN — NEOMYCIN AND POLYMYXIN B SULFATES AND BACITRACIN ZINC: 400; 3.5; 5 OINTMENT TOPICAL at 09:06

## 2018-06-05 RX ADMIN — GUAIFENESIN 200 MG: 200 SOLUTION ORAL at 05:06

## 2018-06-05 RX ADMIN — CEFEPIME HYDROCHLORIDE 2 G: 2 INJECTION, POWDER, FOR SOLUTION INTRAVENOUS at 05:06

## 2018-06-05 RX ADMIN — ALLOPURINOL 100 MG: 100 TABLET ORAL at 08:06

## 2018-06-05 RX ADMIN — NEOMYCIN AND POLYMYXIN B SULFATES AND BACITRACIN ZINC: 400; 3.5; 5 OINTMENT TOPICAL at 04:06

## 2018-06-05 RX ADMIN — MOXIFLOXACIN HYDROCHLORIDE 400 MG: 400 TABLET, FILM COATED ORAL at 08:06

## 2018-06-05 RX ADMIN — NEOMYCIN AND POLYMYXIN B SULFATES AND BACITRACIN ZINC: 400; 3.5; 5 OINTMENT TOPICAL at 08:06

## 2018-06-05 RX ADMIN — GUAIFENESIN 200 MG: 200 SOLUTION ORAL at 11:06

## 2018-06-05 NOTE — PT/OT/SLP PROGRESS
Physical Therapy      Patient Name:  Courtney Hammer   MRN:  [id number]    Patient not seen today secondary to pt and family discussing hospice services. Will follow-up with pt when appropriate, as scheduled.    Tali Tim, PT

## 2018-06-05 NOTE — SUBJECTIVE & OBJECTIVE
Subjective:     Interval History: Pt with episodes of tachycardia overnight. Rhythm was sinus. Still with persistent couch as reported by daughter    Objective:     Vital Signs (Most Recent):  Temp: 98.4 °F (36.9 °C) (06/05/18 1128)  Pulse: (!) 132 (06/05/18 1148)  Resp: 18 (06/05/18 1128)  BP: 125/60 (06/05/18 1128)  SpO2: 96 % (06/05/18 1128) Vital Signs (24h Range):  Temp:  [98.1 °F (36.7 °C)-98.4 °F (36.9 °C)] 98.4 °F (36.9 °C)  Pulse:  [] 132  Resp:  [16-20] 18  SpO2:  [90 %-96 %] 96 %  BP: (113-132)/(57-64) 125/60     Weight: 39.9 kg (87 lb 15.4 oz)  Body mass index is 17.77 kg/m².  Body surface area is 1.29 meters squared.    ECOG SCORE         KPS     Intake/Output - Last 3 Shifts       06/03 0700 - 06/04 0659 06/04 0700 - 06/05 0659 06/05 0700 - 06/06 0659    P.O. 560  200    I.V. (mL/kg)  873.8 (21.9) 433.8 (10.9)    Total Intake(mL/kg) 560 (14) 873.8 (21.9) 633.8 (15.9)    Urine (mL/kg/hr)  1300 (1.4)     Total Output   1300      Net +560 -426.3 +633.8           Urine Occurrence 6 x  1 x    Stool Occurrence 4 x 2 x 2 x          Physical Exam   HENT:   Head: Normocephalic and atraumatic.   Mouth/Throat: No oropharyngeal exudate.   Thin, elderly  female in no acute distress   Cardiovascular: Normal rate and normal heart sounds.    No murmur heard.  Pulmonary/Chest: No respiratory distress. She has no wheezes.   Faint left lower lobe rales that clear with cough   Abdominal:   Abdomen is distended. Does not appear tender to palpation   Musculoskeletal: She exhibits no edema or tenderness.   Neurological: She is alert. No cranial nerve deficit.   Skin: Skin is warm and dry. Capillary refill takes less than 2 seconds.   Nursing note and vitals reviewed.      Significant Labs:   CBC:   Recent Labs  Lab 06/03/18 2010 06/04/18  0810 06/05/18  0500   .20* 190.80* 208.30*   HGB 8.4* 8.5* 8.1*   HCT 26.2* 25.6* 24.8*   PLT 75* 65* 62*    and CMP:   Recent Labs  Lab 06/04/18  0810  06/04/18  1245 06/05/18  0500    141 132*   K 3.9 3.4* 4.2   * 111* 105   CO2 19* 20* 19*   GLU 96 122* 76   BUN 20 20 20   CREATININE 1.3 1.4 1.3   CALCIUM 8.1* 8.8 8.1*   PROT 6.2 7.0 6.1   ALBUMIN 1.9* 2.2* 1.9*   BILITOT 0.8 0.9 0.7   ALKPHOS 112 128 110   AST 60* 74* 79*   ALT 24 25 25   ANIONGAP 8 10 8   EGFRNONAA 39.7* 36.3* 39.7*       Diagnostic Results:  I have reviewed all pertinent imaging results/findings within the past 24 hours.

## 2018-06-05 NOTE — PT/OT/SLP PROGRESS
"Speech Language Pathology Treatment    Patient Name:  Courtney Hammer   MRN:  [id number]  Admitting Diagnosis: Sepsis with acute organ dysfunction    Recommendations:                 General Recommendations:  Follow-up not indicated  Diet recommendations:  Regular, Liquid Diet Level: Thin   Aspiration Precautions: 1 bite/sip at a time, Alternating bites/sips, Avoid talking while eating, Check for pocketing/oral residue, Eliminate distractions, Feed only when awake/alert, Frequent oral care, HOB to 90 degrees, Meds crushed in puree, Monitor for s/s of aspiration, Small bites/sips and Strict aspiration precautions   General Precautions: Standard, aspiration, fall  Communication strategies:  go to room if call light pushed; yes/no questions    Subjective     Pt was awake/alert and in NAD, but remained nonverbal entire session, except to phonate "ah" after PO trial. Pt able to follow most commands despite offering no verbal responses. Pt's grandson was present.    Pain/Comfort:  · Pain Rating 1: 0/10    Objective:     Has the patient been evaluated by SLP for swallowing?   Yes  Keep patient NPO? No   Current Respiratory Status: nasal cannula      Pt found awake/alert in bed with grandson present.  Grandson stated pt recently finished eating lunch and noted to have consumed approx 50%.  Grandson reports no overt s/s of aspiration during PO intake, but states pt does spontaneously cough at times in general due to PNA.  Pt exhibiting rotary jaw movements prior to any PO presentations. However, no PO was present upon examination of oral cavity.  Pt willing to accept PO trials of thin liquids via cup and straw, as well as 1/2 sliced peach.  Swallow responses were timely for thin liquid trials.  Pt exhibited prolonged mastication of soft solid. Given sip of liquid to facilitate initiation of pharyngeal swallow.  Rotary jaw movements continued following liquid wash, but no PO remained in oral cavity upon examination.  No overt s/s of " aspiration observed. Spontaneous cough x 1 noted prior to any PO presentations on this date.  Education provided to pt and grandson regarding recommendations to continue current diet, but to implement safe swallowing strategies to minimize risks of aspiration, such as presentations of small bites, one bite at a time, allowing adequate time to clear oral cavity prior to presenting next bite.  Pt's grandson expressing excellent understanding and insight into safe feeding strategies. Pt will need reinforcement and continued assistance/supervision during meals.  SLP explained that no further SLP services were warranted at this time.     Assessment:     Courtney Hammer is a 77 y.o. female. Oropharyngeal phases of the swallow found to be WFL when provided with proper assistance/supervision to implement safe swallowing strategies. No further skilled SLP services warranted at this time.     Goals:    SLP Goals        Problem: SLP Goal    Goal Priority Disciplines Outcome   SLP Goal   (Resolved)     SLP Outcome(s) achieved   Description:  Speech Language Pathology  Goal expected to be met by 6/8:  1. Pt will tolerate regular consistency diet and thin liquids with no overt clinical signs of aspiration.                     Plan:     · Plan of Care reviewed with:  patient, grandchild(stephen)   · SLP Follow-Up:  No       Discharge recommendations:  home with hospice (no further skilled SLP services warranted at this time)     Time Tracking:     SLP Treatment Date:   06/05/18  Speech Start Time:  1516  Speech Stop Time:  1526     Speech Total Time (min):  10 min    Billable Minutes: Treatment Swallowing Dysfunction 10    ANNE Lee, DOLLY-SLP  06/05/2018     ANNE Lee, CCC-SLP  Speech Language Pathologist  [phone]  6/5/2018

## 2018-06-05 NOTE — PLAN OF CARE
MDR's with Dr Fernández.  The patient's family has decided to move forward with comfort measures.  Planning to d/c home this afternoon vs tomorrow with hospice care.  SW arranged for hospice liaison to speak with the family today and arrange coordination of DME delivery.  The patient's family is in agreement with the d/c plan.  Will continue to follow.       06/05/18 1330   Final Note   Assessment Type Final Discharge Note   Discharge Disposition HospiceHome   What phone number can be called within the next 1-3 days to see how you are doing after discharge? ([phone])   Hospital Follow Up  Appt(s) scheduled? (n/a)   Discharge plans and expectations educations in teach back method with documentation complete? Yes

## 2018-06-05 NOTE — ASSESSMENT & PLAN NOTE
-Likely 2/2 PNA  -Wean oxygen as tolerated to maintain O2 sats>92  -will taper antibiotics to PO Levofloxacin

## 2018-06-05 NOTE — DISCHARGE SUMMARY
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 History limited as from ED chart, no family at bedside and no answer from spouse cell in chart. Per report baseline from last clinic visit is non-verbal but follows commands. Per family in last week has had worsening weakness, cough, diarrhea and intermittent fevers. Denied nausea, vomiting. Oncologic history as below. Currently from notes transitioned from hydroxyurea to jakofi    History:  She was first noted to have an elevated platelet count in early 2011 with CBC 6/7/11 showing WBC 56.9K, Hb 12.0, Plt 1090K (74P, 21L).  On 7/8/11 she had a marrow biopsy showing 90% cellularity with megakaryocyte hyperplasia with dysmorphic features. Myeloid hyperplasia with left shift. No lymphoid infiltrate or increased blasts. Flow cytometry showed virtually no B cells but no other concerning features. [employer]ics 46,XY[20]. Bcr-abl negative. Testing for Jak2 V617F on 7/7/15 was positive in 5% of total Jak2 DNA. Additional testing that same day for CalR and Mpl exon 10 mutation were both negative. Hence, she was diagnosed with Jak2+ ET.     She was started on hydroxyurea and ASA for essential thrombocythemia and had been stable on this until she began to require blood transfusions early in 2015. Her RBC transfusion needs slowly increased. She was continued on 1500 mg hydroxyurea, her same dose for many years. Her transfusion needs increased along with an alarming increase in her WBC.     CBC 6/18/15 WBC 31.8K,  hemoglobin 4.4, platelets 212K. She received 4U PRBC and CBC on 6/30 WBC 23K, hemoglobin 10.3, platelets 97K.  A repeat marrow on 1/26/16 showed a 100% cellular marrow with increased megakaryocytes and storage iron and only occasional blasts on CD34 staining (morphologic blast count 1%).  Jak2 V617F mutation was again detected without CALR or MPL exon 10 mutations.     Hence, she was started on Ruxolitinib 20 mg bid 2/2015.  This was cut to 10 mg bid as of 10/9/17. She was hospitalized for pneumonia just before new year ( Dec 2017). It has since been stopped due to persistently rising WBC count.  She was hospitalized in late February 2018 for confusion. She had a bone marrow biopsy on 2/21/18. There was no AML or increased blasts. The marrow was very hypercellular.      * No surgery found *     Hospital Course: 1. HCAP: She presented with cough, fever and altered mental status (as reported by family). On chest x-ray done in ER she was noted to have findings concerning for developing pneumonia. She was started on IV fluid and IV broad spectrum as empiric coverage for health-care associated pneumonia. By hospital day 1 she demonstrated some improvement in terms of her mental status. Her antibiotics were tapered to oral amoxacillin on Hospital day 3 and she remained afebrile. She did continue to have a non-productive cough which was managed symptomatically with guaifenesin and benzonate. Blood cultures remained without growth and sputum cultures were not able to be obtained. She was discharged on hospital day 5 with a 5 day course of Levofloxacin to complete a 10 day course of antibiotics  2. WHIT-2 positive myeloproliferative disorder: She was noted to have a WBC of 163k and hemoglobin of 6.7 on presentation. She was being treated with Ruxolitinib (Jakafi) which was held on admission. She was transfused 1 unit of blood Her WBC gradually increased to 208k during her hospitalization. On Hospital Day 3 she was started on  Hydrea at a dose of 500 mg BID. On discharge this was increased to 1000 mg BID in order to treat her leukocytosis. She also began demonstrating 5-7% blasts in her peripheral blood raising concern for transformation to a leukemia. The patient's progressive disease and poor long term prognosis was discussed with her family as the patient was not alert enough to make medical decisions on her behalf. The decision was made to pursue Hospice and she was subsequently discharged with home hospice.    Consults         Status Ordering Provider     Inpatient consult to Critical Care Medicine  Once     Provider:  (Not yet assigned)    Completed SMITH ENRIQUEZ IV     Inpatient consult to Critical Care Medicine  Once     Provider:  (Not yet assigned)    Completed RENA REYEZ          Significant Diagnostic Studies: Radiology: X-Ray: CXR: X-Ray Chest 1 View (CXR):   Results for orders placed or performed during the hospital encounter of 05/31/18   X-Ray Chest 1 View    Narrative    EXAMINATION:  XR CHEST 1 VIEW    CLINICAL HISTORY:  cough;    TECHNIQUE:  Single frontal view of the chest was performed.    COMPARISON:  05/31/2018    FINDINGS:  The cardiomediastinal silhouette is prominent, stable.  There is stable elevation of the right hemidiaphragm..  There is no pleural effusion.  The trachea is midline.  The lungs are symmetrically expanded bilaterally with patchy increased interstitial and parenchymal attenuation bilaterally, somewhat worsened since the previous exam, suggesting worsening edema or possibly developing infection, follow-up advised..  There is no pneumothorax.  The osseous structures are unchanged..      Impression    As above      Electronically signed by: Lukas Mendez MD  Date:    06/03/2018  Time:    21:17     MRI: wnl  CT scan: Brain without acute abnormalities    Pending Diagnostic Studies:     None        Final Active Diagnoses:    Diagnosis Date Noted POA    PRINCIPAL PROBLEM:  Sepsis with acute  organ dysfunction [A41.9, R65.20] 05/31/2018 Yes    Normal anion gap metabolic acidosis [E87.2] 06/04/2018 Clinically Undetermined    Slow transit constipation [K59.01] 06/04/2018 Yes    Alteration in skin integrity [R23.9] 06/04/2018 Yes    Dermatitis associated with moisture from stool incontinence [L25.8, R15.9] 06/04/2018 Yes    Blood in stool [K92.1] 06/03/2018 Yes    Acute respiratory failure with hypoxia [J96.01] 06/03/2018 Yes    Tachypnea [R06.82] 06/03/2018 Unknown    Goals of care, counseling/discussion [Z71.89] 06/03/2018 Not Applicable    Pneumonia of right lower lobe due to infectious organism [J18.1] 05/31/2018 Yes    Acute renal failure superimposed on stage 3 chronic kidney disease [N17.9, N18.3] 05/31/2018 Yes    Encephalopathy, metabolic [G93.41] 05/31/2018 Yes    Tachycardia [R00.0] 02/21/2018 Yes    Myeloproliferative disorder [D47.1]  Yes    Thrombocytopenia [D69.6] 01/15/2018 Yes    Physical debility [R53.81] 01/03/2018 Yes    Leukocytosis [D72.829] 01/02/2018 Yes    Anemia [D64.9] 07/02/2015 Yes      Problems Resolved During this Admission:    Diagnosis Date Noted Date Resolved POA      Discharged Condition: stable    Disposition:     Follow Up:    Patient Instructions:   No discharge procedures on file.  Medications:  Reconciled Home Medications:      Medication List      START taking these medications    albuterol-ipratropium 2.5 mg-0.5 mg/3 mL nebulizer solution  Commonly known as:  DUO-NEB  Take 3 mLs by nebulization every 4 (four) hours as needed for Wheezing. Rescue     benzonatate 100 MG capsule  Commonly known as:  TESSALON  Take 1 capsule (100 mg total) by mouth 3 (three) times daily as needed for Cough.     dextromethorphan-guaifenesin  mg/5 ml  mg/5 mL liquid  Commonly known as:  ROBITUSSIN-DM  Take 10 mLs by mouth every 4 (four) hours as needed (cough/congestion).     hydroxyurea 500 mg Cap  Commonly known as:  HYDREA  Take 2 capsules (1,000 mg total)  by mouth 2 (two) times daily.     levoFLOXacin 750 MG tablet  Commonly known as:  LEVAQUIN  Take 1 tablet (750 mg total) by mouth every other day. For 3 doses     polyethylene glycol 17 gram Pwpk  Commonly known as:  GLYCOLAX  Take 17 g by mouth once daily.  Start taking on:  6/6/2018     simethicone 80 MG chewable tablet  Commonly known as:  MYLICON  Take 1 tablet (80 mg total) by mouth 3 (three) times daily as needed for Flatulence.        CHANGE how you take these medications    promethazine 6.25 mg/5 mL syrup  Commonly known as:  PHENERGAN  Take 10 mLs (12.5 mg total) by mouth every 6 (six) hours as needed for Nausea.  What changed:  · how much to take  · how to take this  · when to take this  · reasons to take this        CONTINUE taking these medications    allopurinol 100 MG tablet  Commonly known as:  ZYLOPRIM  Take 2 tablets (200 mg total) by mouth once daily.     VIOS AEROSOL DELIVERY SYSTEM [employer]  Generic drug:  nebulizer and compressor  USE AS DIRECTED     walker Misc  Rolling walker     zinc gluconate 50 mg tablet  Take 50 mg by mouth once daily.        STOP taking these medications    cyproheptadine 4 mg tablet  Commonly known as:  PERIACTIN     hydroCHLOROthiazide 12.5 mg capsule  Commonly known as:  MICROZIDE     JAKAFI 5 mg Tab  Generic drug:  ruxolitinib     megestrol 400 mg/10 mL (40 mg/mL) Susp  Commonly known as:  MEGACE     potassium chloride SA 20 MEQ tablet  Commonly known as:  MILLY ALEXANDER MD  Bone Marrow Transplant  [facility]

## 2018-06-05 NOTE — PLAN OF CARE
Problem: Patient Care Overview  Goal: Plan of Care Review  Outcome: Ongoing (interventions implemented as appropriate)  Pt had no c/o pain or nausea. LR at 75/ hr. Incontinent of urine and stool X 1. Placed purewick. Daughter requests to have siderails X 4. Daughter remained at bedside. Bed alarm on.  Afebrile. Frequent rounds made to assess pain and safety. Patient remained free from falls. Bed in lowest position. Side rails up X 2. Call light within reach. Will continue to monitor.

## 2018-06-05 NOTE — PROGRESS NOTES
[facility]  Hematology  Bone Marrow Transplant  Progress Note    Patient Name: Courtney Hammer  Admission Date: 5/31/2018  Hospital Length of Stay: 5 days  Code Status: DNR    Subjective:     Interval History: Pt with episodes of tachycardia overnight. Rhythm was sinus. Still with persistent couch as reported by daughter    Objective:     Vital Signs (Most Recent):  Temp: 98.4 °F (36.9 °C) (06/05/18 1128)  Pulse: (!) 132 (06/05/18 1148)  Resp: 18 (06/05/18 1128)  BP: 125/60 (06/05/18 1128)  SpO2: 96 % (06/05/18 1128) Vital Signs (24h Range):  Temp:  [98.1 °F (36.7 °C)-98.4 °F (36.9 °C)] 98.4 °F (36.9 °C)  Pulse:  [] 132  Resp:  [16-20] 18  SpO2:  [90 %-96 %] 96 %  BP: (113-132)/(57-64) 125/60     Weight: 39.9 kg (87 lb 15.4 oz)  Body mass index is 17.77 kg/m².  Body surface area is 1.29 meters squared.    ECOG SCORE         KPS     Intake/Output - Last 3 Shifts       06/03 0700 - 06/04 0659 06/04 0700 - 06/05 0659 06/05 0700 - 06/06 0659    P.O. 560  200    I.V. (mL/kg)  873.8 (21.9) 433.8 (10.9)    Total Intake(mL/kg) 560 (14) 873.8 (21.9) 633.8 (15.9)    Urine (mL/kg/hr)  1300 (1.4)     Total Output   1300      Net +560 -426.3 +633.8           Urine Occurrence 6 x  1 x    Stool Occurrence 4 x 2 x 2 x          Physical Exam   HENT:   Head: Normocephalic and atraumatic.   Mouth/Throat: No oropharyngeal exudate.   Thin, elderly  female in no acute distress   Cardiovascular: Normal rate and normal heart sounds.    No murmur heard.  Pulmonary/Chest: No respiratory distress. She has no wheezes.   Faint left lower lobe rales that clear with cough   Abdominal:   Abdomen is distended. Does not appear tender to palpation   Musculoskeletal: She exhibits no edema or tenderness.   Neurological: She is alert. No cranial nerve deficit.   Skin: Skin is warm and dry. Capillary refill takes less than 2 seconds.   Nursing note and vitals reviewed.      Significant Labs:   CBC:   Recent Labs  Lab  06/03/18 2010 06/04/18  0810 06/05/18  0500   .20* 190.80* 208.30*   HGB 8.4* 8.5* 8.1*   HCT 26.2* 25.6* 24.8*   PLT 75* 65* 62*    and CMP:   Recent Labs  Lab 06/04/18  0810 06/04/18  1245 06/05/18  0500    141 132*   K 3.9 3.4* 4.2   * 111* 105   CO2 19* 20* 19*   GLU 96 122* 76   BUN 20 20 20   CREATININE 1.3 1.4 1.3   CALCIUM 8.1* 8.8 8.1*   PROT 6.2 7.0 6.1   ALBUMIN 1.9* 2.2* 1.9*   BILITOT 0.8 0.9 0.7   ALKPHOS 112 128 110   AST 60* 74* 79*   ALT 24 25 25   ANIONGAP 8 10 8   EGFRNONAA 39.7* 36.3* 39.7*       Diagnostic Results:  I have reviewed all pertinent imaging results/findings within the past 24 hours.    Assessment/Plan:     * Sepsis with acute organ dysfunction    Presents with mental status change, FELICE and fevers; improving  -Sources at present include RLL PNA vs UTI vs less likely CNS (no meningeal signs)  -Organ dysfunction of mental status change and FELICE  -4/4 SIRS, 2/3 qSOFA, LA 2.7 trended down to 0.6 but up to 2.6 on 6/3, PCT 5  -s/p 30cc/kg bous  -NGTD on all cultures   -Will discuss GOC this afternoon with  but for now will continue vanc, cefepime and moxi   -Evaluated by MICU who recommends floor admission, appreciate help        Slow transit constipation    - KUB with abdominal distension and fecal impaction  - will try dulcolax suppository and schedule miralax daily         Normal anion gap metabolic acidosis    - possibly secondary to increased LA 2/2 sepsis vs progression of disease  - does have concurrent hyperchloremia but not having diarrhea   - no apparent offending medications   - will continue to trend        Tachypnea    - likely compensatory for his metabolic acidosis         Acute respiratory failure with hypoxia    -Likely 2/2 PNA  -Wean oxygen as tolerated to maintain O2 sats>92  -will taper antibiotics to PO Levofloxacin          Encephalopathy, metabolic    -Resolved and back to baseline as per daughter  -Likely 2/2 sepsis but high risk for clots  2/2 disease  -MRI without any acute abnormality         Acute renal failure superimposed on stage 3 chronic kidney disease    Likely 2/2 sepsis, s/p 30cc/kg, improving         Pneumonia of right lower lobe due to infectious organism    -See sepsis        Myeloproliferative disorder    She has an increasing WBC with increased blasts even in her periphery. She has been treated with Jakifi which is currently on hold. Given the progression of her disease as well as poor functional status we have recommended hospice which the family is considering.  - will increase Hydrea to 1000 mg PO BID  - will consult Hospice to discuss with patient's family        Thrombocytopenia    - no signs of overt bleeding  - transfuse for platelets <10        Physical debility    - PT/OT ordered  - recommending SNF        Anemia    See myeloproliferative d/o            VTE Risk Mitigation         Ordered     IP VTE HIGH RISK PATIENT  Once      05/31/18 1913          Disposition: Will discuss with family. Possible discharge to Hospice soon    Quincy Aiken MD  Bone Marrow Transplant  [facility]

## 2018-06-05 NOTE — PLAN OF CARE
Problem: SLP Goal  Goal: SLP Goal  Speech Language Pathology  Goal expected to be met by 6/8:  1. Pt will tolerate regular consistency diet and thin liquids with no overt clinical signs of aspiration.    Outcome: Outcome(s) achieved Date Met: 06/05/18  Pt tolerating current diet with assistance with feeding.  Noted plan for pt to discharge home with hospice tomorrow. SLP will sign off at this time as pt is tolerating current diet. ANNE Lee, CCC-SLP  Speech Language Pathologist  [phone]  6/5/2018

## 2018-06-05 NOTE — PLAN OF CARE
Problem: Patient Care Overview  Goal: Plan of Care Review  Outcome: Ongoing (interventions implemented as appropriate)  Plan of care reviewed with patient and family. patient tachycardic this shift.Physicians ware, All other VSS stable. !2 lead EKG performed at bedside. Vancomycin one time dose given. New PIV started in Left forearm. Fluids  changed to LR. Potassium replaced this shift. Patient remains in continent of bladder and stool. With three BM this shift.  Patient has productive cough. Bed in lowest position, call light within reach, family at bedside. Patient instructed to call for any needs. Report given to oncoming nurse

## 2018-06-05 NOTE — PROGRESS NOTES
Consult received to arrange for home hospice. Met with pts. Spouse and daughter. Explained hospice care to them and answered all questions. Family in agreement with a referral to hospice at this time. Referral for hospice made to [facility] ([phone]). Spoke with Carl at [facility], all necessary information given and orders received. Pt. Will be followed by [facility] in [place], [place] ([phone]). Hospice liaison has met with pts. Family and will coordinate delivery of equipment to pts. Home. Equipment to be delivered to pts. Home in the am and pt. To transfer to home at that time. Pts. Family aware of the plan and in agreement. Will follow.

## 2018-06-05 NOTE — HOSPITAL COURSE
1. HCAP: She presented with cough, fever and altered mental status (as reported by family). On chest x-ray done in ER she was noted to have findings concerning for developing pneumonia. She was started on IV fluid and IV broad spectrum as empiric coverage for health-care associated pneumonia. By hospital day 1 she demonstrated some improvement in terms of her mental status. Her antibiotics were tapered to oral amoxacillin on Hospital day 3 and she remained afebrile. She did continue to have a non-productive cough which was managed symptomatically with guaifenesin and benzonate. Blood cultures remained without growth and sputum cultures were not able to be obtained. She was discharged on hospital day 5 with a 5 day course of Levofloxacin to complete a 10 day course of antibiotics  2. WHIT-2 positive myeloproliferative disorder: She was noted to have a WBC of 163k and hemoglobin of 6.7 on presentation. She was being treated with Ruxolitinib (Jakafi) which was held on admission. She was transfused 1 unit of blood Her WBC gradually increased to 208k during her hospitalization. On Hospital Day 3 she was started on Hydrea at a dose of 500 mg BID. On discharge this was increased to 1000 mg BID in order to treat her leukocytosis. She also began demonstrating 5-7% blasts in her peripheral blood raising concern for transformation to a leukemia. The patient's progressive disease and poor long term prognosis was discussed with her family as the patient was not alert enough to make medical decisions on her behalf. The decision was made to pursue Hospice and she was subsequently discharged with home hospice.    ROS on Day of Discharge:  Having frequent stools from stool softeners. She denies any fevers, chills, cough, SOB, CP, N/V, constipation, or dysuria.     PE on Day of Discharge:  HENT:   Head: Normocephalic and atraumatic.   Mouth/Throat: No oropharyngeal exudate.   Thin, elderly  female in no acute distress    Cardiovascular: Normal rate and normal heart sounds.    No murmur heard.  Pulmonary/Chest: No respiratory distress. She has no wheezes.   Faint left lower lobe rales that clear with cough   Abdominal:   Abdomen is distended. Does not appear tender to palpation   Musculoskeletal: She exhibits no edema or tenderness.   Neurological: She is alert. No cranial nerve deficit.   Skin: Skin is warm and dry. Capillary refill takes less than 2 seconds.   Nursing note and vitals reviewed.

## 2018-06-05 NOTE — PLAN OF CARE
Problem: Patient Care Overview  Goal: Plan of Care Review  Outcome: Ongoing (interventions implemented as appropriate)  Patient AAOX4, moving in bed with assistance, and fall precautions maintained. Bed alarm in use. Family at bedside throughout shift. Float heels initiated today. Wound care completed to sacrum. Patient incontinent of bowel and bladder.  preparing arrangements for home hospice. Oxygen saturation maintained on 6L NC. Patient stable, will continue to monitor.

## 2018-06-05 NOTE — PLAN OF CARE
[facility]     Department of Hospital Medicine     [address] [place] [address]     [phone] after hours  [phone] fax                                   HOSPICE  ORDERS     Patient Name: Courtney Hammer  YOB: 1940    06/05/2018    Admit to Hospice:  Home Service    Diagnoses:  Active Hospital Problems    Diagnosis  POA    *Sepsis with acute organ dysfunction [A41.9, R65.20]  Yes    Normal anion gap metabolic acidosis [E87.2]  Clinically Undetermined    Slow transit constipation [K59.01]  Yes    Alteration in skin integrity [R23.9]  Yes    Dermatitis associated with moisture from stool incontinence [L25.8, R15.9]  Yes    Blood in stool [K92.1]  Yes    Acute respiratory failure with hypoxia [J96.01]  Yes    Tachypnea [R06.82]  Unknown    Goals of care, counseling/discussion [Z71.89]  Not Applicable    Pneumonia of right lower lobe due to infectious organism [J18.1]  Yes    Acute renal failure superimposed on stage 3 chronic kidney disease [N17.9, N18.3]  Yes    Encephalopathy, metabolic [G93.41]  Yes    Tachycardia [R00.0]  Yes    Myeloproliferative disorder [D47.1]  Yes    Thrombocytopenia [D69.6]  Yes    Physical debility [R53.81]  Yes    Leukocytosis [D72.829]  Yes    Anemia [D64.9]  Yes      Resolved Hospital Problems    Diagnosis Date Resolved POA   No resolved problems to display.       Hospice Qualifying Diagnoses: Myeloproliferative disorder.        Patient has a life expectancy < 6 months due to these conditions.    Vital Signs: Routine per Hospice Protocol.    Allergies:Review of patient's allergies indicates:  No Known Allergies    Diet: regular diet     Activities: activity as tolerated    Nursing: Per Hospice Routine    Future Orders:  Hospice Medical Director may dictate new orders for comfortable care measures & sign death certificate.    Medications:      Courtney Hammer   Home Medication Instructions  CARRI:[id number]    Printed on:06/05/18 9366   Medication Information                      albuterol-ipratropium (DUO-NEB) 2.5 mg-0.5 mg/3 mL nebulizer solution  Take 3 mLs by nebulization every 4 (four) hours as needed for Wheezing. Rescue             allopurinol (ZYLOPRIM) 100 MG tablet  Take 2 tablets (200 mg total) by mouth once daily.             benzonatate (TESSALON) 100 MG capsule  Take 1 capsule (100 mg total) by mouth 3 (three) times daily as needed for Cough.             guaifenesin 100 mg/5 ml (ROBITUSSIN) 100 mg/5 mL syrup  Take 10 mLs (200 mg total) by mouth 4 (four) times daily as needed for Cough.             hydroxyurea (HYDREA) 500 mg Cap  Take 2 capsules (1,000 mg total) by mouth 2 (two) times daily.             levoFLOXacin (LEVAQUIN) 750 MG tablet  Take 1 tablet (750 mg total) by mouth every other day. For 3 doses             polyethylene glycol (GLYCOLAX) 17 gram PwPk  Take 17 g by mouth once daily.             promethazine (PHENERGAN) 6.25 mg/5 mL syrup  Take 10 mLs (12.5 mg total) by mouth every 6 (six) hours as needed for Nausea.             simethicone (MYLICON) 80 MG chewable tablet  Take 1 tablet (80 mg total) by mouth 3 (three) times daily as needed for Flatulence.             zinc gluconate 50 mg tablet  Take 50 mg by mouth once daily.                         _________________________________  Quincy Aiken MD  06/05/2018

## 2018-06-05 NOTE — ASSESSMENT & PLAN NOTE
She has an increasing WBC with increased blasts even in her periphery. She has been treated with Jakifi which is currently on hold. Given the progression of her disease as well as poor functional status we have recommended hospice which the family is considering.  - will increase Hydrea to 1000 mg PO BID  - will consult Hospice to discuss with patient's family

## 2018-06-06 VITALS
RESPIRATION RATE: 20 BRPM | BODY MASS INDEX: 17.73 KG/M2 | WEIGHT: 87.94 LBS | HEIGHT: 59 IN | SYSTOLIC BLOOD PRESSURE: 117 MMHG | DIASTOLIC BLOOD PRESSURE: 60 MMHG | TEMPERATURE: 98 F | OXYGEN SATURATION: 98 % | HEART RATE: 115 BPM

## 2018-06-06 PROBLEM — E87.20 NORMAL ANION GAP METABOLIC ACIDOSIS: Status: ACTIVE | Noted: 2018-06-06

## 2018-06-06 LAB
ALBUMIN SERPL BCP-MCNC: 1.8 G/DL
ALP SERPL-CCNC: 99 U/L
ALT SERPL W/O P-5'-P-CCNC: 20 U/L
ANION GAP SERPL CALC-SCNC: 8 MMOL/L
ANISOCYTOSIS BLD QL SMEAR: SLIGHT
AST SERPL-CCNC: 67 U/L
BASOPHILS # BLD AUTO: ABNORMAL K/UL
BASOPHILS NFR BLD: 0 %
BILIRUB SERPL-MCNC: 0.6 MG/DL
BLASTS NFR BLD MANUAL: 7 %
BUN SERPL-MCNC: 17 MG/DL
CALCIUM SERPL-MCNC: 7.9 MG/DL
CHLORIDE SERPL-SCNC: 109 MMOL/L
CO2 SERPL-SCNC: 23 MMOL/L
CREAT SERPL-MCNC: 1 MG/DL
DIFFERENTIAL METHOD: ABNORMAL
EOSINOPHIL # BLD AUTO: ABNORMAL K/UL
EOSINOPHIL NFR BLD: 1 %
ERYTHROCYTE [DISTWIDTH] IN BLOOD BY AUTOMATED COUNT: 18.4 %
EST. GFR  (AFRICAN AMERICAN): >60 ML/MIN/1.73 M^2
EST. GFR  (NON AFRICAN AMERICAN): 54.5 ML/MIN/1.73 M^2
GLUCOSE SERPL-MCNC: 80 MG/DL
HCT VFR BLD AUTO: 20.5 %
HGB BLD-MCNC: 6.5 G/DL
HYPOCHROMIA BLD QL SMEAR: ABNORMAL
IMM GRANULOCYTES # BLD AUTO: ABNORMAL K/UL
IMM GRANULOCYTES NFR BLD AUTO: ABNORMAL %
INR PPP: 1.4
LYMPHOCYTES # BLD AUTO: ABNORMAL K/UL
LYMPHOCYTES NFR BLD: 4 %
MAGNESIUM SERPL-MCNC: 1.1 MG/DL
MCH RBC QN AUTO: 29 PG
MCHC RBC AUTO-ENTMCNC: 31.7 G/DL
MCV RBC AUTO: 92 FL
METAMYELOCYTES NFR BLD MANUAL: 2 %
MONOCYTES # BLD AUTO: ABNORMAL K/UL
MONOCYTES NFR BLD: 2 %
MYELOCYTES NFR BLD MANUAL: 32 %
NEUTROPHILS NFR BLD: 33 %
NEUTS BAND NFR BLD MANUAL: 13 %
NRBC BLD-RTO: 2 /100 WBC
PHOSPHATE SERPL-MCNC: 2.2 MG/DL
PLATELET # BLD AUTO: 55 K/UL
PMV BLD AUTO: ABNORMAL FL
POIKILOCYTOSIS BLD QL SMEAR: SLIGHT
POLYCHROMASIA BLD QL SMEAR: ABNORMAL
POTASSIUM SERPL-SCNC: 3.1 MMOL/L
PROMYELOCYTES NFR BLD MANUAL: 6 %
PROT SERPL-MCNC: 5.3 G/DL
PROTHROMBIN TIME: 13.9 SEC
RBC # BLD AUTO: 2.24 M/UL
SODIUM SERPL-SCNC: 140 MMOL/L
SPHEROCYTES BLD QL SMEAR: ABNORMAL
TARGETS BLD QL SMEAR: ABNORMAL
URATE SERPL-MCNC: 4 MG/DL
VANCOMYCIN SERPL-MCNC: 13.1 UG/ML
WBC # BLD AUTO: 193.7 K/UL

## 2018-06-06 PROCEDURE — 25000003 PHARM REV CODE 250: Performed by: HOSPITALIST

## 2018-06-06 PROCEDURE — 85027 COMPLETE CBC AUTOMATED: CPT

## 2018-06-06 PROCEDURE — 80053 COMPREHEN METABOLIC PANEL: CPT

## 2018-06-06 PROCEDURE — 85610 PROTHROMBIN TIME: CPT

## 2018-06-06 PROCEDURE — 99233 SBSQ HOSP IP/OBS HIGH 50: CPT | Mod: ,,, | Performed by: INTERNAL MEDICINE

## 2018-06-06 PROCEDURE — 25000003 PHARM REV CODE 250: Performed by: INTERNAL MEDICINE

## 2018-06-06 PROCEDURE — 80202 ASSAY OF VANCOMYCIN: CPT

## 2018-06-06 PROCEDURE — 85007 BL SMEAR W/DIFF WBC COUNT: CPT

## 2018-06-06 PROCEDURE — 84100 ASSAY OF PHOSPHORUS: CPT

## 2018-06-06 PROCEDURE — 84550 ASSAY OF BLOOD/URIC ACID: CPT

## 2018-06-06 PROCEDURE — 36415 COLL VENOUS BLD VENIPUNCTURE: CPT

## 2018-06-06 PROCEDURE — 63600175 PHARM REV CODE 636 W HCPCS: Performed by: INTERNAL MEDICINE

## 2018-06-06 PROCEDURE — 83735 ASSAY OF MAGNESIUM: CPT

## 2018-06-06 RX ORDER — POLYETHYLENE GLYCOL 3350 17 G/17G
17 POWDER, FOR SOLUTION ORAL 2 TIMES DAILY PRN
Status: DISCONTINUED | OUTPATIENT
Start: 2018-06-06 | End: 2018-06-06 | Stop reason: HOSPADM

## 2018-06-06 RX ADMIN — CEFEPIME HYDROCHLORIDE 2 G: 2 INJECTION, POWDER, FOR SOLUTION INTRAVENOUS at 05:06

## 2018-06-06 RX ADMIN — HYDROXYUREA 1000 MG: 500 CAPSULE ORAL at 09:06

## 2018-06-06 RX ADMIN — ALLOPURINOL 100 MG: 100 TABLET ORAL at 09:06

## 2018-06-06 RX ADMIN — MOXIFLOXACIN HYDROCHLORIDE 400 MG: 400 TABLET, FILM COATED ORAL at 09:06

## 2018-06-06 RX ADMIN — GUAIFENESIN 200 MG: 200 SOLUTION ORAL at 11:06

## 2018-06-06 RX ADMIN — GUAIFENESIN 200 MG: 200 SOLUTION ORAL at 12:06

## 2018-06-06 RX ADMIN — GUAIFENESIN 200 MG: 200 SOLUTION ORAL at 05:06

## 2018-06-06 NOTE — PLAN OF CARE
Problem: Patient Care Overview  Goal: Plan of Care Review  Outcome: Ongoing (interventions implemented as appropriate)  Pt is resting in bed, assisted in turning every 2 hours.Pt's nephew visits at the bedside. Minimally productive cough continues, guaifenesin scheduled and given as ordered. PIV to left forearm remains patent with LR infusing at 75 cc/hr/pump. IV Cefepime continues. Pt continues incontinent of both urine and stool, stool is both loose and pasty. Dressing to skin tear on sacrum remains in place, dry and intact. O2 continues at 6 L/min/nasal cannula. Pt's personal items and call bell placed within easy reach. Bed is locked, in lowest position, with side rails up x 2. Pt is scheduled to be moved to home with hospice. Pt and her family member encouraged to call for assistance. Will continue to monitor.

## 2018-06-06 NOTE — PROGRESS NOTES
O2- Pt on 6L sating 98%  Activity-Pt on bedrest  Devices- Pt not being sent on oxygen.   Tolerating-Pt tolerating PO diet and medication.  Elimination-Pt voiding and having bowel movements. Incontinent.  Self Care- Pt dependent.  Teaching- Family instructed on when to take home meds.     Pt's peripheral IV removed. Cath tip intact. Pt tolerated well. VS are WDL. AVS and prescriptions given to pt. All questions answered. Family verbalized understanding. Pt transported by ambulance.

## 2018-06-06 NOTE — ASSESSMENT & PLAN NOTE
Presents with mental status change, FELICE and fevers; improving  -Sources at present include RLL PNA vs UTI vs less likely CNS (no meningeal signs)  -Organ dysfunction of mental status change and FELICE  -4/4 SIRS, 2/3 qSOFA, LA 2.7 trended down to 0.6 but up to 2.6 on 6/3, PCT 5  -s/p 30cc/kg bous  -NGTD on all cultures   -Will plan to DC to hospice with a 10 day course of levaquin  -Evaluated by MICU who recommends floor admission, appreciate help

## 2018-06-06 NOTE — PROGRESS NOTES
All arrangements have been made for pt. To dc home with [facility] ([phone]). Spoke with Gavino at [facility] and informed of dc today. Admit nurse will meet pt. At home and do full assessment upon arrival. Transportation to home arranged thru [facility].  set for 2pm. Family aware of the plan and in agreement. Will follow.

## 2018-06-06 NOTE — DISCHARGE SUMMARY
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 History limited as from ED chart, no family at bedside and no answer from spouse cell in chart. Per report baseline from last clinic visit is non-verbal but follows commands. Per family in last week has had worsening weakness, cough, diarrhea and intermittent fevers. Denied nausea, vomiting. Oncologic history as below. Currently from notes transitioned from hydroxyurea to jakofi    History:  She was first noted to have an elevated platelet count in early 2011 with CBC 6/7/11 showing WBC 56.9K, Hb 12.0, Plt 1090K (74P, 21L).  On 7/8/11 she had a marrow biopsy showing 90% cellularity with megakaryocyte hyperplasia with dysmorphic features. Myeloid hyperplasia with left shift. No lymphoid infiltrate or increased blasts. Flow cytometry showed virtually no B cells but no other concerning features. Cytogentics 46,XY[20]. Bcr-abl negative. Testing for Jak2 V617F on 7/7/15 was positive in 5% of total Jak2 DNA. Additional testing that same day for CalR and Mpl exon 10 mutation were both negative. Hence, she was diagnosed with Jak2+ ET.     She was started on hydroxyurea and ASA for essential thrombocythemia and had been stable on this until she began to require blood transfusions early in 2015. Her RBC transfusion needs slowly increased. She was continued on 1500 mg hydroxyurea, her same dose for many years. Her transfusion needs increased along with an alarming increase in her WBC.     CBC 6/18/15 WBC 31.8K,  hemoglobin 4.4, platelets 212K. She received 4U PRBC and CBC on 6/30 WBC 23K, hemoglobin 10.3, platelets 97K.  A repeat marrow on 1/26/16 showed a 100% cellular marrow with increased megakaryocytes and storage iron and only occasional blasts on CD34 staining (morphologic blast count 1%).  Jak2 V617F mutation was again detected without CALR or MPL exon 10 mutations.     Hence, she was started on Ruxolitinib 20 mg bid 2/2015.  This was cut to 10 mg bid as of 10/9/17. She was hospitalized for pneumonia just before new year ( Dec 2017). It has since been stopped due to persistently rising WBC count.  She was hospitalized in late February 2018 for confusion. She had a bone marrow biopsy on 2/21/18. There was no AML or increased blasts. The marrow was very hypercellular.      * No surgery found *     Hospital Course: 1. HCAP: She presented with cough, fever and altered mental status (as reported by family). On chest x-ray done in ER she was noted to have findings concerning for developing pneumonia. She was started on IV fluid and IV broad spectrum as empiric coverage for health-care associated pneumonia. By hospital day 1 she demonstrated some improvement in terms of her mental status. Her antibiotics were tapered to oral amoxacillin on Hospital day 3 and she remained afebrile. She did continue to have a non-productive cough which was managed symptomatically with guaifenesin and benzonate. Blood cultures remained without growth and sputum cultures were not able to be obtained. She was discharged on hospital day 5 with a 5 day course of Levofloxacin to complete a 10 day course of antibiotics  2. WHIT-2 positive myeloproliferative disorder: She was noted to have a WBC of 163k and hemoglobin of 6.7 on presentation. She was being treated with Ruxolitinib (Jakafi) which was held on admission. She was transfused 1 unit of blood Her WBC gradually increased to 208k during her hospitalization. On Hospital Day 3 she was started on  Hydrea at a dose of 500 mg BID. On discharge this was increased to 1000 mg BID in order to treat her leukocytosis. She also began demonstrating 5-7% blasts in her peripheral blood raising concern for transformation to a leukemia. The patient's progressive disease and poor long term prognosis was discussed with her family as the patient was not alert enough to make medical decisions on her behalf. The decision was made to pursue Hospice and she was subsequently discharged with home hospice.    ROS on Day of Discharge:  Having frequent stools from stool softeners. She denies any fevers, chills, cough, SOB, CP, N/V, constipation, or dysuria.     PE on Day of Discharge:  HENT:   Head: Normocephalic and atraumatic.   Mouth/Throat: No oropharyngeal exudate.   Thin, elderly  female in no acute distress   Cardiovascular: Normal rate and normal heart sounds.    No murmur heard.  Pulmonary/Chest: No respiratory distress. She has no wheezes.   Faint left lower lobe rales that clear with cough   Abdominal:   Abdomen is distended. Does not appear tender to palpation   Musculoskeletal: She exhibits no edema or tenderness.   Neurological: She is alert. No cranial nerve deficit.   Skin: Skin is warm and dry. Capillary refill takes less than 2 seconds.   Nursing note and vitals reviewed.      Consults         Status Ordering Provider     Inpatient consult to Critical Care Medicine  Once     Provider:  (Not yet assigned)    Completed SMITH ENRIQUEZ IV     Inpatient consult to Critical Care Medicine  Once     Provider:  (Not yet assigned)    Completed RENA REYEZ          Significant Diagnostic Studies: Labs: All labs within the past 24 hours have been reviewed    Pending Diagnostic Studies:     None        Final Active Diagnoses:    Diagnosis Date Noted POA    PRINCIPAL PROBLEM:  Sepsis with acute organ dysfunction [A41.9, R65.20] 05/31/2018 Yes    Normal anion gap metabolic acidosis [E87.2] 06/04/2018  Clinically Undetermined    Slow transit constipation [K59.01] 06/04/2018 Yes    Alteration in skin integrity [R23.9] 06/04/2018 Yes    Dermatitis associated with moisture from stool incontinence [L25.8, R15.9] 06/04/2018 Yes    Blood in stool [K92.1] 06/03/2018 Yes    Acute respiratory failure with hypoxia [J96.01] 06/03/2018 Yes    Tachypnea [R06.82] 06/03/2018 Unknown    Goals of care, counseling/discussion [Z71.89] 06/03/2018 Not Applicable    Pneumonia of right lower lobe due to infectious organism [J18.1] 05/31/2018 Yes    Acute renal failure superimposed on stage 3 chronic kidney disease [N17.9, N18.3] 05/31/2018 Yes    Encephalopathy, metabolic [G93.41] 05/31/2018 Yes    Tachycardia [R00.0] 02/21/2018 Yes    Myeloproliferative disorder [D47.1]  Yes    Thrombocytopenia [D69.6] 01/15/2018 Yes    Physical debility [R53.81] 01/03/2018 Yes    Leukocytosis [D72.829] 01/02/2018 Yes    Anemia [D64.9] 07/02/2015 Yes      Problems Resolved During this Admission:    Diagnosis Date Noted Date Resolved POA      Discharged Condition: poor    Disposition:     Follow Up:    Patient Instructions:   No discharge procedures on file.  Medications:  Reconciled Home Medications:      Medication List      START taking these medications    albuterol-ipratropium 2.5 mg-0.5 mg/3 mL nebulizer solution  Commonly known as:  DUO-NEB  Take 3 mLs by nebulization every 4 (four) hours as needed for Wheezing. Rescue     benzonatate 100 MG capsule  Commonly known as:  TESSALON  Take 1 capsule (100 mg total) by mouth 3 (three) times daily as needed for Cough.     dextromethorphan-guaifenesin  mg/5 ml  mg/5 mL liquid  Commonly known as:  ROBITUSSIN-DM  Take 10 mLs by mouth every 4 (four) hours as needed (cough/congestion).     hydroxyurea 500 mg Cap  Commonly known as:  HYDREA  Take 2 capsules (1,000 mg total) by mouth 2 (two) times daily.     levoFLOXacin 750 MG tablet  Commonly known as:  LEVAQUIN  Take 1 tablet (750  mg total) by mouth every other day. For 3 doses     polyethylene glycol 17 gram Pwpk  Commonly known as:  GLYCOLAX  Take 17 g by mouth once daily.     simethicone 80 MG chewable tablet  Commonly known as:  MYLICON  Take 1 tablet (80 mg total) by mouth 3 (three) times daily as needed for Flatulence.        CHANGE how you take these medications    promethazine 6.25 mg/5 mL syrup  Commonly known as:  PHENERGAN  Take 10 mLs (12.5 mg total) by mouth every 6 (six) hours as needed for Nausea.  What changed:  · how much to take  · how to take this  · when to take this  · reasons to take this        CONTINUE taking these medications    allopurinol 100 MG tablet  Commonly known as:  ZYLOPRIM  Take 2 tablets (200 mg total) by mouth once daily.     VIOS AEROSOL DELIVERY SYSTEM Phuong  Generic drug:  nebulizer and compressor  USE AS DIRECTED     walker Misc  Rolling walker     zinc gluconate 50 mg tablet  Take 50 mg by mouth once daily.        STOP taking these medications    cyproheptadine 4 mg tablet  Commonly known as:  PERIACTIN     hydroCHLOROthiazide 12.5 mg capsule  Commonly known as:  MICROZIDE     JAKAFI 5 mg Tab  Generic drug:  ruxolitinib     megestrol 400 mg/10 mL (40 mg/mL) Susp  Commonly known as:  MEGACE     potassium chloride SA 20 MEQ tablet  Commonly known as:  K-DUR,KLOR-GUY Malin MD  Bone Marrow Transplant  [facility]

## 2018-06-08 LAB
BACTERIA BLD CULT: NORMAL
BACTERIA BLD CULT: NORMAL

## 2018-06-11 PROBLEM — R06.82 TACHYPNEA: Status: ACTIVE | Noted: 2018-06-11

## 2018-06-13 NOTE — PT/OT/SLP DISCHARGE
Physical Therapy Discharge Summary    Name: Courtney Hammer  MRN: [id number]   Principal Problem: Sepsis with acute organ dysfunction     Patient Discharged from acute Physical Therapy on 2018.  Please refer to prior PT noted date on 2018 for functional status.     Assessment:     Patient was discharged unexpectedly.  Information required to complete an accurate discharge summary is unknown.  Refer to therapy initial evaluation and last progress note for initial and most recent functional status and goal achievement.  Recommendations made may be found in medical record.    Objective:     GOALS:    Physical Therapy Goals     Not on file          Multidisciplinary Problems (Resolved)        Problem: Physical Therapy Goal    Goal Priority Disciplines Outcome Goal Variances Interventions   Physical Therapy Goal   (Resolved)     PT/OT, PT Outcome(s) achieved     Description:  Goals to be met by: 2018     Patient will increase functional independence with mobility by performin. Supine to sit with MInimal Assistance  2. Sit to supine with MInimal Assistance  3. Sit to stand transfer with Moderate Assistance  4. Bed to chair transfer with Maximum Assistance  5. Sitting at edge of bed x20 minutes with Supervision  6. Lower extremity exercise program x30 reps per handout, with assistance as needed                      Reasons for Discontinuation of Therapy Services  Transfer to alternate level of care.      Plan:     Patient Discharged to: Palliative Care/Hospice.    Isiah Wilson, PT  2018

## 2018-10-10 ENCOUNTER — TELEPHONE (OUTPATIENT)
Dept: ADMINISTRATIVE | Facility: CLINIC | Age: 78
End: 2018-10-10

## 2018-10-10 NOTE — TELEPHONE ENCOUNTER
Home Health Recert 05/04/2018 - 07/02/2018 with [facility] - Dr. Fern Borja.  services.

## 2021-03-10 NOTE — TELEPHONE ENCOUNTER
----- Message from Bebeto Parmar MD sent at 1/15/2018 11:40 AM CST -----   ldh, uric acid today  Cbc, cmp, ldh, uric acid twice weekly from 1/18  F/u in 10days   Monitor.

## 2021-03-22 NOTE — PLAN OF CARE
Problem: Patient Care Overview  Goal: Plan of Care Review  Outcome: Ongoing (interventions implemented as appropriate)  Patient is very limited in her verbal responses; she opens her eyes spontaneously, and obeys commands. VSS, afebrile. Fall precautions maintained. Patient instructed on how to contact the nurse. Daughter at bedside. Rounds done hourly. Patient without distress on 3.5L NC. Patient advanced to regular diet with moderate appetite. Maintenance fluids continued. PO moxifloxacin continued. No complaints of pain or nausea. Continuous cardiac monitoring continued; heart rate in the 110s-130s. IV magnesium administered. PO potassium administered. Hydroxyurea administered. Patient had no trouble swallowing the pill. Patient with 2 very small liquid, maroon bowel movements, good urine output. Tuberculin injection administered to left forearm to be checked on Wednesday morning 6/6 at 8 AM. Questions and concerns have been addressed; will continue to monitor.        Return to prior level of independence

## 2022-12-02 NOTE — TELEPHONE ENCOUNTER
"Spoke with Eladia with patient's home care company, gave her orders which were in her discharge notes from yesterday, 2/22/18 of "CBC, CMP, Mg, Phos, LDH and Uric Acid every Monday/Wed/Friday until 3/9/18.      "
----- Message from Tashia Novak sent at 2/23/2018 11:23 AM CST -----  Contact: Home Health  Aide Cara  Pt Home Home Aide called to see if Pt needs to have Labs done every Monday  Call Back [phone] Cara  Thank You   FERNY Novak    
01-Dec-2022 17:45

## 2023-04-19 NOTE — ASSESSMENT & PLAN NOTE
Patient stopped in and asked what she needs to do before hand surgery on 05.19.2023.  She states the surgeon doesn't need anything from the PCP.  The writer did suggest that she may need to set up a consultation about a week or 2 prior to the surgery.  She declined setting up the appointment and would like a call from clinical staff to discuss.  Please call and advise.    Phone:  [phone]   Presents with mental status change, FELICE and fevers; improving  -Sources at present include RLL PNA vs UTI vs less likely CNS (no meningeal signs)  -Organ dysfunction of mental status change and FELICE  -4/4 SIRS, 2/3 qSOFA, LA 2.7 trended down to 0.6 but up to 2.6 on 6/3, PCT 5  -s/p 30cc/kg bous  -NGTD on all cultures   -Will discuss GOC this afternoon with  but for now will continue vanc, cefepime and moxi   -Evaluated by MICU who recommends floor admission, appreciate help

## 2023-05-30 NOTE — TELEPHONE ENCOUNTER
Returned call to patient's . Confirmed appointment dates and times.    Protopic Pregnancy And Lactation Text: This medication is Pregnancy Category C. It is unknown if this medication is excreted in breast milk when applied topically.

## 2024-09-03 NOTE — PROGRESS NOTES
CC: Myelofibrosis, here for follow up visit    CC: MPN, follow up     HPI: Mrs. 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 Her weakness has improved and both her platelet and WBC have risen.  Too early to say whether there is a trend to rise for her hemoglobin on its own.   She had not required a blood transfusion since 3/2016 but she was transfused 3 U PRBC on 10/9/17-11/20/17.  None since then.     No fever or other new problems.  She is active despite her anemia.  Diabetes now controlled.     History:  She was first noted to have an elevated platelet count in early 2011 with CBC 6/7/11 showing WBC 56.9K, Hb 12.0, Plt 1090K (74P, 21L).  On 7/8/11 she had a marrow biopsy showing 90% cellularity with megakaryocyte hyperplasia with dysmorphic features. Myeloid hyperplasia with left shift. No lymphoid infiltrate or increased blasts. Flow cytometry showed virtually no B cells but no other concerning features. Cytogentics 46,XY[20]. Bcr-abl negative. Testing for Jak2 V617F on 7/7/15 was positive in 5% of total Jak2 DNA. Additional testing that same day for CalR and Mpl exon 10 mutation were both negative. Hence, she was diagnosed with Jak2+ ET.     She was started on hydroxyurea and ASA for essential thrombocythemia and had been stable on this until she began to require blood transfusions early in 2015. Her RBC transfusion needs slowly increased. She was continued on 1500 mg hydroxyurea, her same dose for many years. Her transfusion needs increased along with an alarming increase in her WBC.     CBC 6/18/15 WBC 31.8K, hemoglobin 4.4, platelets 212K. She received 4U PRBC and CBC on 6/30 WBC 23K, hemoglobin 10.3, platelets 97K.  A repeat marrow on 1/26/16 showed a 100% cellular marrow with increased megakaryocytes and storage iron and only occasional blasts on CD34 staining (morphologic blast count 1%).  Jak2 V617F mutation was  again detected without CALR or MPL exon 10 mutations.     Hence, she was started on Ruxolitinib 20 mg bid 2/2015.  This was cut to 10 mg bid as of 10/9/17. She was hospitalized for pneumonia just before new year ( Dec 2017).      Interval History: She is here for a follow up visit. She was hiospitalized in late February 2018  For confusion. She had a bone marrow biopsy. She is now on Hydrea 1gram daily.     Review of Systems   Constitutional: Positive for malaise/fatigue. Negative for chills, fever and weight loss.   HENT: Negative for congestion and nosebleeds.    Eyes: Negative for blurred vision and double vision.   Respiratory: Negative for cough, hemoptysis, sputum production and shortness of breath.    Cardiovascular: Negative for chest pain, palpitations, leg swelling and PND.   Gastrointestinal: Negative for abdominal pain, diarrhea, heartburn, nausea and vomiting.   Genitourinary: Negative for dysuria and urgency.   Musculoskeletal: Negative for myalgias and neck pain.   Skin: Negative for rash.   Neurological: Negative for dizziness, sensory change, speech change, weakness and headaches.   Endo/Heme/Allergies: Does not bruise/bleed easily.   Psychiatric/Behavioral: Negative for depression.     Current Outpatient Prescriptions   Medication Sig    allopurinol (ZYLOPRIM) 300 MG tablet Take 1 tablet (300 mg total) by mouth once daily.    hydroxyurea (HYDREA) 500 mg Cap Take 2 capsules (1,000 mg total) by mouth once daily.    megestrol (MEGACE) 400 mg/10 mL (40 mg/mL) Susp     zinc gluconate 50 mg tablet Take 50 mg by mouth once daily.    VIOS AEROSOL DELIVERY SYSTEM Phuong USE AS DIRECTED    walker Misc Rolling walker     No current facility-administered medications for this visit.        Vitals:    03/07/18 1104   BP: (!) 147/60   Pulse: 107   Resp: 16   Temp: 99.1 °F (37.3 °C)       Physical Exam   Constitutional: She appears well-developed. No distress.   HENT:   Head: Normocephalic and atraumatic.    Mouth/Throat: No oropharyngeal exudate.   Eyes: Pupils are equal, round, and reactive to light. No scleral icterus.   Neck: Normal range of motion.   Cardiovascular: Normal rate and normal heart sounds.    No murmur heard.  Pulmonary/Chest: Effort normal and breath sounds normal. No respiratory distress. She exhibits no tenderness.   Abdominal: Soft. She exhibits distension. There is no rebound and no guarding.   Musculoskeletal: She exhibits no edema.   Lymphadenopathy:     She has no cervical adenopathy.   Neurological: She is alert.   She is confused   Skin: Skin is warm. No erythema.       Component      Latest Ref Rng & Units 3/7/2018   WBC      3.90 - 12.70 K/uL 35.05 (H)   RBC      4.00 - 5.40 M/uL 2.82 (L)   Hemoglobin      12.0 - 16.0 g/dL 8.3 (L)   Hematocrit      37.0 - 48.5 % 26.8 (L)   MCV      82 - 98 fL 95   MCH      27.0 - 31.0 pg 29.4   MCHC      32.0 - 36.0 g/dL 31.0 (L)   RDW      11.5 - 14.5 % 22.0 (H)   Platelets      150 - 350 K/uL 96 (L)   MPV      9.2 - 12.9 fL SEE COMMENT   Immature Granulocytes      0.0 - 0.5 % CANCELED   Immature Grans (Abs)      0.00 - 0.04 K/uL CANCELED   nRBC      0 /100 WBC 9 (A)   Gran%      38.0 - 73.0 % 45.0   Lymph%      18.0 - 48.0 % 12.0 (L)   Mono%      4.0 - 15.0 % 7.0   Eosinophil%      0.0 - 8.0 % 0.0   Basophil%      0.0 - 1.9 % 0.0   BANDS      % 18.0   Metamyelocytes      % 9.0   Myelocytes      % 8.0   Promyelocytes      % 1.0   Aniso       Slight   Poik       Slight   Poly       Occasional   Ovalocytes       Occasional   [place] Cells       Occasional   Large/Giant Platelets       Present   Differential Method       Manual   LD      110 - 260 U/L 1,297 (H)   Uric Acid      2.4 - 5.7 mg/dL 5.2   Magnesium      1.6 - 2.6 mg/dL 1.5 (L)   Phosphorus      2.7 - 4.5 mg/dL 2.3 (L)     Component      Latest Ref Rng & Units 3/7/2018   Sodium      136 - 145 mmol/L 133 (L)   Potassium      3.5 - 5.1 mmol/L 3.8   Chloride      95 - 110 mmol/L 101   CO2      23 - 29  "mmol/L 23   Glucose      70 - 110 mg/dL 271 (H)   BUN, Bld      8 - 23 mg/dL 7 (L)   Creatinine      0.5 - 1.4 mg/dL 1.3   Calcium      8.7 - 10.5 mg/dL 9.1   Total Protein      6.0 - 8.4 g/dL 7.8   Albumin      3.5 - 5.2 g/dL 2.9 (L)   Total Bilirubin      0.1 - 1.0 mg/dL 1.0   Alkaline Phosphatase      55 - 135 U/L 94   AST      10 - 40 U/L 57 (H)   ALT      10 - 44 U/L 16   Anion Gap      8 - 16 mmol/L 9   eGFR if African American      >60 mL/min/1.73 m:2 45.7 (A)   eGFR if non African American      >60 mL/min/1.73 m:2 39.7 (A)       2/21/18 Bone marrow biopsy NGS result    "1. ASXL1: Chr20(GRCh37):g.[id number]; NM_015338.5(ASXL1):c.1848_1849insTA; p.Irn549*  (45%) 2. BCOR: ChrX(GRCh37):g.00242503R>T; NM_001123385.1(BCOR):c.867G>A; p.Wmw998* (37%)  3. JAK2: Chr9(GRCh37):g.4899459J>T; NM_004972.3(JAK2):c.1849G>T; p.Ohr569Nar (45%)  4. NRAS: Chr1(GRCh37):g.110674552O>T; NM_002524.4(NRAS):c.35G>A; p.Smm70Oki (38%)  5. RUNX1: Chr21(GRCh37):g.07223251F>C; NM_001001890.2(RUNX1):c.512A>G; p.Vmp657Rzd (40%)  6. TET2: Chr4(GRCh37):g.585497304jrk; NM_001127208.2(TET2):c.987del; p.Ozs653Rhgpc*18 (43%) Note:    2/21/18 bone marrow biopsy: FINAL PATHOLOGIC DIAGNOSIS  BONE MARROW ASPIRATE SMEARS, TOUCH IMPRINTS, CORE BIOPSY, LEFT ILIAC CREST, AND CLOT SECTION:  --Persistent myeloproliferative neoplasm, see comment.  --Stainable iron is present.  COMMENT: The core biopsy is hypercellular for age (80%). Blasts are not increased by morphology, by immunohistochemistry, or in the corresponding flow cytometric analysis (please see separate report). The majority  of the cellularity is composed of erythroid elements, which demonstrate a shift towards immaturity and exhibit cytologic atypia. Megakaryocytes appear mildly increased and many appear small/hypolobated. Special stain for  reticulin demonstrates a moderate increase in reticulin fibrosis (MF-2).    Assessment:     1.Ms. Hammer has JAK2 V617F myeloproliferative " disorder with acceleration and a rapidly rising WBC in late 2015 with falling hemoglobin. A repeat marrow on 1/26/16 showed a 100% cellular marrow with increased megakaryocytes and storage iron and only occasional blasts on CD34 staining (morphologic blast count 1%).  JAK2 V617F mutation was again detected without CALR or Mpl exon 10 mutations.     Hence, she was started on Ruxolitinib 20 mg bid 2/2016.  Her WBC had been controlled since that time but all of her counts fell significantly but 10/9/17 with WBC 16K, hemoglobin 5 and platelets 81K.  Hence, she received 2U RBC; ruxolitinib was decreased to 10 mg BID with improvement in her counts and acceptable WBC. Ruxolitinib was held around new year 2018 after she was hospitalized at [facility] for likely pneumonia. Her WBC count was 71.54k on 1/15.Differential count showed predominantly granulocytes, bands. No blasts. Hydrea 1g BID was prescribed. She is now back on Jakafi(from 1/16/18). WBC 54.6 k on 1/25/18. WBC count 79.8 on 2/5/18. It is 35k today. No AML or increased blasts on bone marrow biopsy done on 2/21/18. She will continue Hydrea 1.5g daily as well as ASA 81mg daily.         2. 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 She is on Allopurinol 100mg daily. Uric acid 5.1 on 2/5/18 and 5.6 today.     3. Thrombocytopenia: Mild, asymptomatic.      4. Anemia: She has normocytic, hyperchromic anemia.  Her anemia is chronic.       5. Hyperglycemia: Blood glucose is 271 today. Dr. Pacheco, her primary care attending, will continue to follow up on her diabetes.     6. FELICE: Serum creatinine 1.3 on 3/7/18.  No clear evidence of tumor lysis. Will follow electrolytes, renal function weekly.      Plan:     1. Hold Jakafi; Continue Hydrea 1500 mg daily  2. CBC, CMP, LDH, uric acid weekly  3. Allopurinol 100mg daily  4. F/u in 3 weeks          Distress Screening Results: Psychosocial Distress screening score of Distress Score: 0 noted and reviewed. No intervention indicated.   PROVIDER:[TOKEN:[[id number]],SCHEDULEDAPPT:[09/10/2024]]

## 2024-09-05 NOTE — SIGNIFICANT EVENT
Discussed goals of care with daughter at bedside.  Regarding her earlier goals of care conversion she states she spoke with her father (Duy) and they have decided patient should be made DNR.  I called and spoke with the patient's  (Duy) to confirm.  Patient made DNR in the chart and paperwork signed by myself.      Eddi Rodriguez M.D.   PGY-2  Pager [phone]       Reviewed with patient:    Immediately report any new sign of illness to our surgeon’s office.    Our rooms are relatively small and if more than two visitors are coming with you to your procedure, we may ask that some individuals wait in the waiting room to minimize overcrowding.    Please bring a cashless form of payment if you wish to obtain medications (should they be prescribed) from the hospital outpatient pharmacy following your procedure.    If you start any new medications or over the counter supplements between now and your surgery/procedure date, please call us back